# Patient Record
Sex: MALE | Race: WHITE | NOT HISPANIC OR LATINO | Employment: OTHER | URBAN - METROPOLITAN AREA
[De-identification: names, ages, dates, MRNs, and addresses within clinical notes are randomized per-mention and may not be internally consistent; named-entity substitution may affect disease eponyms.]

---

## 2017-01-23 DIAGNOSIS — I10 HYPERTENSION GOAL BP (BLOOD PRESSURE) < 150/90: Primary | ICD-10-CM

## 2017-01-23 NOTE — TELEPHONE ENCOUNTER
lisinopril      Last Written Prescription Date: 10/24/16  Last Fill Quantity: 90, # refills: 0  Last Office Visit with G, [facility] or [facility] prescribing provider: 7/28/16       POTASSIUM   Date Value Ref Range Status   02/02/2016 4.2 3.4 - 5.3 mmol/L Final     CREATININE   Date Value Ref Range Status   02/02/2016 0.82 0.66 - 1.25 mg/dL Final     BP Readings from Last 3 Encounters:   07/28/16 128/88   04/21/16 140/99   02/02/16 164/116

## 2017-01-24 RX ORDER — LISINOPRIL 20 MG/1
20 TABLET ORAL DAILY
Qty: 30 TABLET | Refills: 0 | Status: SHIPPED | OUTPATIENT
Start: 2017-01-24 | End: 2017-02-27

## 2017-01-24 NOTE — TELEPHONE ENCOUNTER
Pt needs appt now. Noted on rx for reminder to pt. Refilled 30 days only. Labs ordered  Stephanie Corona RN

## 2017-02-27 DIAGNOSIS — I10 HYPERTENSION GOAL BP (BLOOD PRESSURE) < 150/90: ICD-10-CM

## 2017-02-27 NOTE — TELEPHONE ENCOUNTER
lisinopril (PRINIVIL/ZESTRIL) 20 MG tablet      Last Written Prescription Date: 1/24/17  Last Fill Quantity: 30, # refills: 0  Last Office Visit with G, P or [facility] prescribing provider: 7/28/16       Potassium   Date Value Ref Range Status   02/02/2016 4.2 3.4 - 5.3 mmol/L Final     Creatinine   Date Value Ref Range Status   02/02/2016 0.82 0.66 - 1.25 mg/dL Final     BP Readings from Last 3 Encounters:   07/28/16 128/88   04/21/16 (!) 140/99   02/02/16 (!) 164/116

## 2017-03-01 RX ORDER — LISINOPRIL 20 MG/1
20 TABLET ORAL DAILY
Qty: 30 TABLET | Refills: 0 | Status: SHIPPED | OUTPATIENT
Start: 2017-03-01 | End: 2017-03-30

## 2017-03-01 NOTE — TELEPHONE ENCOUNTER
Routing refill request to provider for review/approval because:  Anais given x1 and patient did not follow up, please advise      Routing to  Reception - advise patient due for labs for future refills    Thank you,  Karyn Aviles RN

## 2017-03-06 DIAGNOSIS — I10 HYPERTENSION GOAL BP (BLOOD PRESSURE) < 150/90: ICD-10-CM

## 2017-03-06 PROCEDURE — 36415 COLL VENOUS BLD VENIPUNCTURE: CPT | Performed by: FAMILY MEDICINE

## 2017-03-06 PROCEDURE — 80061 LIPID PANEL: CPT | Performed by: FAMILY MEDICINE

## 2017-03-06 PROCEDURE — 80048 BASIC METABOLIC PNL TOTAL CA: CPT | Performed by: FAMILY MEDICINE

## 2017-03-06 NOTE — LETTER
[facility]   [address] [place]   [address]  [phone]    March 8, 2017      Micah Kelly  [address] [facility] [address]              Dear Mr. Kelly,    Thank you for getting labs done!    Your glucose level, electrolyte level and kidney function, measured with a test called the basic metabolic panel, is normal, which is great news!      Your cholesterol is pretty goo although your triglycerides are a little high.  To keep triglycerides in check,  watch sugar, juice, excess fruit, bread, pasta, rice and  cereal. Please continue to take Omega 3 (fish oil supplements) with  Meals.    Desired or goal levels are:  CHOLESTEROL: Desirable is less than 200.  HDL (Good Cholesterol): Desirable is greater than 40 in men and greater than 50 in women.  LDL (Bad Cholesterol): Desirable is less than 130.  TRIGLYCERIDES: Desirable is less than 150.    As you may know, abnormal cholesterol is one factor that increases your risk for heart disease and stroke. You can improve your cholesterol by controlling the amount and type of fat you eat and by increasing your daily activity level.    Here are some ways to improve your nutrition (adapted from the American Academy of Family Practice handout):  Eat less fat (especially butter, Crisco and other saturated fats)  Buy lean cuts of meat; reduce your portions of red meat or substitute poultry or fish  Use skim milk and low-fat dairy products  Eat no more than 4 egg yolks per week  Avoid fried or fast foods that are high in fat  Eat more fruits and vegetables    If you have any questions, please contact the clinic or schedule an appointment with me, thank you!    Results for orders placed or performed in visit on 03/06/17   Basic metabolic panel   Result Value Ref Range    Sodium 140 133 - 144 mmol/L    Potassium 4.5 3.4 - 5.3 mmol/L    Chloride 106 94 - 109 mmol/L    Carbon Dioxide 30 20 - 32 mmol/L    Anion Gap 4 3 - 14 mmol/L    Glucose  90 70 - 99 mg/dL    Urea Nitrogen 19 7 - 30 mg/dL    Creatinine 0.83 0.66 - 1.25 mg/dL    GFR Estimate >90  Non  GFR Calc   >60 mL/min/1.7m2    GFR Estimate If Black >90   GFR Calc   >60 mL/min/1.7m2    Calcium 8.8 8.5 - 10.1 mg/dL   Lipid panel reflex to direct LDL   Result Value Ref Range    Cholesterol 180 <200 mg/dL    Triglycerides 164 (H) <150 mg/dL    HDL Cholesterol 41 >39 mg/dL    LDL Cholesterol Calculated 106 (H) <100 mg/dL    Non HDL Cholesterol 139 (H) <130 mg/dL           Sincerely,    Gali Kennedy MD/nr

## 2017-03-06 NOTE — MR AVS SNAPSHOT
After Visit Summary   3/6/2017    Micah Kelly    MRN: [id number]           Patient Information     Date Of Birth          1955        Visit Information        Provider Department      3/6/2017 2:30 PM HW LAB [facility][note 2]         Follow-ups after your visit        Your next 10 appointments already scheduled     Mar 06, 2017  2:30 PM CST   LAB with HW LAB   [facility]note 2[facility] ([facility]note 2[facility]    [address] [place] [address] [phone]           Patient must bring picture ID.  Patient should be prepared to give a urine specimen  Please do not eat 10-12 hours before your appointment if you are coming in fasting for labs on lipids, cholesterol, or glucose (sugar).  Pregnant women should follow their Care Team instructions. Water with medications is okay. Do not drink coffee or other fluids.   If you have concerns about taking  your medications, please ask at office or if scheduling via [employer], send a message by clicking on Secure Messaging, Message Your Care Team.              Who to contact     If you have questions or need follow up information about today's clinic visit or your schedule please contact [facility]note 2[facility] directly at [phone].  Normal or non-critical lab and imaging results will be communicated to you by MyChart, letter or phone within 4 business days after the clinic has received the results. If you do not hear from us within 7 days, please contact the clinic through [facility] or phone. If you have a critical or abnormal lab result, we will notify you by phone as soon as possible.  Submit refill requests through [employer] or call your pharmacy and they will forward the refill request to us. Please allow 3 business days for your refill to be completed.          Additional Information About Your Visit        [facility] Information     [employer] lets you send messages to your doctor, view your test results, renew your  "prescriptions, schedule appointments and more. To sign up, go to [email or web address] . Click on \"Log in\" on the left side of the screen, which will take you to the Welcome page. Then click on \"Sign up Now\" on the right side of the page.     You will be asked to enter the access code listed below, as well as some personal information. Please follow the directions to create your username and password.     Your access code is: [email or web address]  Expires: 2017 11:20 AM     Your access code will  in 90 days. If you need help or a new code, please call your [place] clinic or [phone].        Care EveryWhere ID     This is your Care EveryWhere ID. This could be used by other organizations to access your [place] medical records  [id number]         Blood Pressure from Last 3 Encounters:   16 128/88   16 (!) 140/99   16 (!) 164/116    Weight from Last 3 Encounters:   16 155 lb (70.3 kg)   16 152 lb 2 oz (69 kg)   16 150 lb (68 kg)              Today, you had the following     No orders found for display       Primary Care Provider Office Phone # Fax #    Gali Kennedy [phone]       [facility] [address]  [facility] [address]        Thank you!     Thank you for choosing [facility]  for your care. Our goal is always to provide you with excellent care. Hearing back from our patients is one way we can continue to improve our services. Please take a few minutes to complete the written survey that you may receive in the mail after your visit with us. Thank you!             Your Updated Medication List - Protect others around you: Learn how to safely use, store and throw away your medicines at www.disposemymeds.org.          This list is accurate as of: 3/3/17 11:20 AM.  Always use your most recent med list.                   Brand Name Dispense Instructions for use    ASPIRIN PO      Take 81 mg by mouth daily       " FISH OIL PO          lisinopril 20 MG tablet    PRINIVIL/ZESTRIL    30 tablet    Take 1 tablet (20 mg) by mouth daily PLEASE SCHEDULE A LAB ONLY APPOINTMENT TO RECEIVE FUTURE REFILLS [phone]       VITAMIN C PO      Take 1,000 mg by mouth daily       VITAMIN D (CHOLECALCIFEROL) PO      Take 400 Units by mouth daily

## 2017-03-07 LAB
ANION GAP SERPL CALCULATED.3IONS-SCNC: 4 MMOL/L (ref 3–14)
BUN SERPL-MCNC: 19 MG/DL (ref 7–30)
CALCIUM SERPL-MCNC: 8.8 MG/DL (ref 8.5–10.1)
CHLORIDE SERPL-SCNC: 106 MMOL/L (ref 94–109)
CHOLEST SERPL-MCNC: 180 MG/DL
CO2 SERPL-SCNC: 30 MMOL/L (ref 20–32)
CREAT SERPL-MCNC: 0.83 MG/DL (ref 0.66–1.25)
GFR SERPL CREATININE-BSD FRML MDRD: NORMAL ML/MIN/1.7M2
GLUCOSE SERPL-MCNC: 90 MG/DL (ref 70–99)
HDLC SERPL-MCNC: 41 MG/DL
LDLC SERPL CALC-MCNC: 106 MG/DL
NONHDLC SERPL-MCNC: 139 MG/DL
POTASSIUM SERPL-SCNC: 4.5 MMOL/L (ref 3.4–5.3)
SODIUM SERPL-SCNC: 140 MMOL/L (ref 133–144)
TRIGL SERPL-MCNC: 164 MG/DL

## 2017-03-07 NOTE — PROGRESS NOTES
Thank you for getting labs done!    Your glucose level, electrolyte level and kidney function, measured with a test called the basic metabolic panel, is normal, which is great news!      Your cholesterol is pretty goo although your triglycerides are a little high.  To keep triglycerides in check,  watch sugar, juice, excess fruit, bread, pasta, rice and  cereal. Please continue to take Omega 3 (fish oil supplements) with  Meals.    Desired or goal levels are:  CHOLESTEROL: Desirable is less than 200.  HDL (Good Cholesterol): Desirable is greater than 40 in men and greater than 50 in women.  LDL (Bad Cholesterol): Desirable is less than 130.  TRIGLYCERIDES: Desirable is less than 150.    As you may know, abnormal cholesterol is one factor that increases your risk for heart disease and stroke. You can improve your cholesterol by controlling the amount and type of fat you eat and by increasing your daily activity level.    Here are some ways to improve your nutrition (adapted from the American Academy of Family Practice handout):  Eat less fat (especially butter, Crisco and other saturated fats)  Buy lean cuts of meat; reduce your portions of red meat or substitute poultry or fish  Use skim milk and low-fat dairy products  Eat no more than 4 egg yolks per week  Avoid fried or fast foods that are high in fat  Eat more fruits and vegetables    If you have any questions, please contact the clinic or schedule an appointment with me, thank you!    Sincerely,  Dr. Gali Kennedy MD  3/7/2017

## 2017-03-30 DIAGNOSIS — I10 HYPERTENSION GOAL BP (BLOOD PRESSURE) < 150/90: ICD-10-CM

## 2017-03-30 NOTE — TELEPHONE ENCOUNTER
Medication Detail      Disp Refills Start End MAXIMILIAN   lisinopril (PRINIVIL/ZESTRIL) 20 MG tablet 30 tablet 0 3/1/2017  No   Sig: Take 1 tablet (20 mg) by mouth daily PLEASE SCHEDULE A LAB ONLY APPOINTMENT TO RECEIVE FUTURE REFILLS [phone]       Last Office Visit with [facility], P or [facility] prescribing provider: 7/28/16       Potassium   Date Value Ref Range Status   03/06/2017 4.5 3.4 - 5.3 mmol/L Final     Creatinine   Date Value Ref Range Status   03/06/2017 0.83 0.66 - 1.25 mg/dL Final     BP Readings from Last 3 Encounters:   07/28/16 128/88   04/21/16 (!) 140/99   02/02/16 (!) 164/116

## 2017-04-03 RX ORDER — LISINOPRIL 20 MG/1
20 TABLET ORAL DAILY
Qty: 90 TABLET | Refills: 1 | Status: SHIPPED | OUTPATIENT
Start: 2017-04-03 | End: 2017-10-22

## 2017-04-03 NOTE — TELEPHONE ENCOUNTER
Patient was given 1 month ernst refill and instructed to make an appointment  No appointment has been scheduled  Alejandrase Silvestre RN

## 2017-04-04 DIAGNOSIS — I10 HYPERTENSION GOAL BP (BLOOD PRESSURE) < 150/90: ICD-10-CM

## 2017-04-04 NOTE — TELEPHONE ENCOUNTER
Medication Detail      Disp Refills Start End MAXIMILIAN   lisinopril (PRINIVIL/ZESTRIL) 20 MG tablet 90 tablet 1 4/3/2017  No   Sig: Take 1 tablet (20 mg) by mouth daily       Last Office Visit with G, P or [facility] prescribing provider: 7/28/16       Potassium   Date Value Ref Range Status   03/06/2017 4.5 3.4 - 5.3 mmol/L Final     Creatinine   Date Value Ref Range Status   03/06/2017 0.83 0.66 - 1.25 mg/dL Final     BP Readings from Last 3 Encounters:   07/28/16 128/88   04/21/16 (!) 140/99   02/02/16 (!) 164/116

## 2017-04-07 RX ORDER — LISINOPRIL 20 MG/1
TABLET ORAL
Qty: 30 TABLET | Refills: 0 | OUTPATIENT
Start: 2017-04-07

## 2017-05-16 ENCOUNTER — OFFICE VISIT (OUTPATIENT)
Dept: FAMILY MEDICINE | Facility: CLINIC | Age: 62
End: 2017-05-16
Payer: COMMERCIAL

## 2017-05-16 VITALS
SYSTOLIC BLOOD PRESSURE: 139 MMHG | HEART RATE: 82 BPM | TEMPERATURE: 98.2 F | WEIGHT: 153.5 LBS | DIASTOLIC BLOOD PRESSURE: 91 MMHG | BODY MASS INDEX: 25.58 KG/M2 | OXYGEN SATURATION: 95 % | HEIGHT: 65 IN

## 2017-05-16 DIAGNOSIS — I10 HYPERTENSION GOAL BP (BLOOD PRESSURE) < 150/90: Primary | ICD-10-CM

## 2017-05-16 PROCEDURE — 99213 OFFICE O/P EST LOW 20 MIN: CPT | Performed by: FAMILY MEDICINE

## 2017-05-16 RX ORDER — AMLODIPINE BESYLATE 2.5 MG/1
2.5 TABLET ORAL DAILY
Qty: 30 TABLET | Refills: 1 | Status: SHIPPED | OUTPATIENT
Start: 2017-05-16 | End: 2017-07-16

## 2017-05-16 NOTE — PROGRESS NOTES
SUBJECTIVE:                                                    Micah Kelly is a 61 year old male who presents to clinic today for the following health issues:      Hypertension Follow-up      Outpatient blood pressures are not being checked.    Low Salt Diet: no added salt       Amount of exercise or physical activity: 4-5 days/week for an average of 15-30 minutes    Problems taking medications regularly: No    Medication side effects: none    Diet: low salt  BP Readings from Last 3 Encounters:   05/16/17 (!) 139/91   07/28/16 128/88   04/21/16 (!) 140/99          Problem list and histories reviewed & adjusted, as indicated.  Additional history: as documented    Patient Active Problem List   Diagnosis     CARDIOVASCULAR SCREENING; LDL GOAL LESS THAN 160     Hypertension goal BP (blood pressure) < 150/90     Need for hepatitis C screening test     Past Surgical History:   Procedure Laterality Date     VASECTOMY  1986    uncomplicated       Social History   Substance Use Topics     Smoking status: Never Smoker     Smokeless tobacco: Never Used     Alcohol use 0.6 oz/week     1 Standard drinks or equivalent per week      Comment: ocassional drink     Family History   Problem Relation Age of Onset     Colon Cancer Mother 88     Peptic Ulcer Disease Mother      Hypertension Father          No Known Allergies  BP Readings from Last 3 Encounters:   05/16/17 (!) 139/91   07/28/16 128/88   04/21/16 (!) 140/99    Wt Readings from Last 3 Encounters:   05/16/17 153 lb 8 oz (69.6 kg)   07/28/16 155 lb (70.3 kg)   04/21/16 152 lb 2 oz (69 kg)                    Reviewed and updated as needed this visit by clinical staff       Reviewed and updated as needed this visit by Provider         ROS:  Constitutional, HEENT, cardiovascular, pulmonary, gi and gu systems are negative, except as otherwise noted.    OBJECTIVE:                                                    BP (!) 139/91  Pulse 82  Temp 98.2  F (36.8  C)  "(Tympanic)  Ht 5' 4.5\" (1.638 m)  Wt 153 lb 8 oz (69.6 kg)  SpO2 95%  BMI 25.94 kg/m2  Body mass index is 25.94 kg/(m^2).  GENERAL: healthy, alert and no distress  RESP: lungs clear to auscultation - no rales, rhonchi or wheezes  CV: regular rate and rhythm, normal S1 S2, no S3 or S4, no murmur, click or rub, no peripheral edema and peripheral pulses strong  MS: no gross musculoskeletal defects noted, no edema  PSYCH: mentation appears normal, affect normal/bright    Diagnostic Test Results:  none      ASSESSMENT/PLAN:                                                    1. Hypertension goal BP (blood pressure) < 150/90  BP Readings from Last 3 Encounters:   05/16/17 (!) 139/91   07/28/16 128/88   04/21/16 (!) 140/99    not at goal, will add norvasc today  Return to clinic 3 months for fu  - amLODIPine (NORVASC) 2.5 MG tablet; Take 1 tablet (2.5 mg) by mouth daily  Dispense: 30 tablet; Refill: 1        Gali Kennedy MD  [facility]        "

## 2017-05-16 NOTE — NURSING NOTE
"Chief Complaint   Patient presents with     Recheck Medication     Hypertension       Initial BP (!) 139/91  Pulse 82  Temp 98.2  F (36.8  C) (Tympanic)  Ht 5' 4.5\" (1.638 m)  Wt 153 lb 8 oz (69.6 kg)  SpO2 95%  BMI 25.94 kg/m2 Estimated body mass index is 25.94 kg/(m^2) as calculated from the following:    Height as of this encounter: 5' 4.5\" (1.638 m).    Weight as of this encounter: 153 lb 8 oz (69.6 kg).  Medication Reconciliation: Toribio Espinoza      "

## 2017-05-16 NOTE — MR AVS SNAPSHOT
"              After Visit Summary   2017    Micah Kelly    MRN: [id number]           Patient Information     Date Of Birth          1955        Visit Information        Provider Department      2017 1:00 PM Gali Kennedy MD [facility]        Today's Diagnoses     Hypertension goal BP (blood pressure) < 150/90    -  1       Follow-ups after your visit        Who to contact     If you have questions or need follow up information about today's clinic visit or your schedule please contact [facility] directly at [phone].  Normal or non-critical lab and imaging results will be communicated to you by MyChart, letter or phone within 4 business days after the clinic has received the results. If you do not hear from us within 7 days, please contact the clinic through [employer] or phone. If you have a critical or abnormal lab result, we will notify you by phone as soon as possible.  Submit refill requests through [employer] or call your pharmacy and they will forward the refill request to us. Please allow 3 business days for your refill to be completed.          Additional Information About Your Visit        MyChart Information     [employer] lets you send messages to your doctor, view your test results, renew your prescriptions, schedule appointments and more. To sign up, go to [email or web address] . Click on \"Log in\" on the left side of the screen, which will take you to the Welcome page. Then click on \"Sign up Now\" on the right side of the page.     You will be asked to enter the access code listed below, as well as some personal information. Please follow the directions to create your username and password.     Your access code is: [email or web address]  Expires: 2017 12:20 PM     Your access code will  in 90 days. If you need help or a new code, please call your [facility] or [phone].        Care EveryWhere ID     This is your Care EveryWhere ID. This " "could be used by other organizations to access your [place] medical records  [id number]        Your Vitals Were     Pulse Temperature Height Pulse Oximetry BMI (Body Mass Index)       82 98.2  F (36.8  C) (Tympanic) 5' 4.5\" (1.638 m) 95% 25.94 kg/m2        Blood Pressure from Last 3 Encounters:   05/16/17 (!) 139/91   07/28/16 128/88   04/21/16 (!) 140/99    Weight from Last 3 Encounters:   05/16/17 153 lb 8 oz (69.6 kg)   07/28/16 155 lb (70.3 kg)   04/21/16 152 lb 2 oz (69 kg)              Today, you had the following     No orders found for display         Today's Medication Changes          These changes are accurate as of: 5/16/17  2:00 PM.  If you have any questions, ask your nurse or doctor.               Start taking these medicines.        Dose/Directions    amLODIPine 2.5 MG tablet   Commonly known as:  NORVASC   Used for:  Hypertension goal BP (blood pressure) < 150/90   Started by:  Gali Kennedy MD        Dose:  2.5 mg   Take 1 tablet (2.5 mg) by mouth daily   Quantity:  30 tablet   Refills:  1            Where to get your medicines      These medications were sent to [place] [address] [place] [facility] [address] [place] [address]  [address] [facility] [address]     Phone:  [phone]     amLODIPine 2.5 MG tablet                Primary Care Provider Office Phone # Fax #    Gali Kennedy [phone]       [facility] [address]  [facility] [address]        Thank you!     Thank you for choosing [facility]  for your care. Our goal is always to provide you with excellent care. Hearing back from our patients is one way we can continue to improve our services. Please take a few minutes to complete the written survey that you may receive in the mail after your visit with us. Thank you!             Your Updated Medication List - Protect others around you: Learn how to safely use, store and throw away your medicines at " www.disposemymeds.org.          This list is accurate as of: 5/16/17  2:00 PM.  Always use your most recent med list.                   Brand Name Dispense Instructions for use    amLODIPine 2.5 MG tablet    NORVASC    30 tablet    Take 1 tablet (2.5 mg) by mouth daily       ASPIRIN PO      Take 81 mg by mouth daily       FISH OIL PO          lisinopril 20 MG tablet    PRINIVIL/ZESTRIL    90 tablet    Take 1 tablet (20 mg) by mouth daily       VITAMIN C PO      Take 1,000 mg by mouth daily       VITAMIN D (CHOLECALCIFEROL) PO      Take 400 Units by mouth daily

## 2017-06-14 DIAGNOSIS — I10 HYPERTENSION GOAL BP (BLOOD PRESSURE) < 150/90: ICD-10-CM

## 2017-06-14 RX ORDER — AMLODIPINE BESYLATE 2.5 MG/1
2.5 TABLET ORAL DAILY
Qty: 30 TABLET | Refills: 1 | Status: CANCELLED | OUTPATIENT
Start: 2017-06-14

## 2017-06-15 NOTE — TELEPHONE ENCOUNTER
Amlodipine      Last Written Prescription Date: 5/16/17  Last Fill Quantity: 30, # refills: 1    Last Office Visit with G, P or [facility] prescribing provider:  5/16/17  Future Office Visit:        BP Readings from Last 3 Encounters:   05/16/17 (!) 139/91   07/28/16 128/88   04/21/16 (!) 140/99     Called pharmacy and patient has a refill available.  Alejandra Rivers RN

## 2017-06-26 ENCOUNTER — TELEPHONE (OUTPATIENT)
Dept: FAMILY MEDICINE | Facility: CLINIC | Age: 62
End: 2017-06-26

## 2017-06-26 NOTE — TELEPHONE ENCOUNTER
Please schedule office visit with Dr. Kennedy for a 3 month follow up for blood pressure and medication. In August when he calls back.    Thank you!    Angélica Victoria

## 2017-07-16 DIAGNOSIS — I10 HYPERTENSION GOAL BP (BLOOD PRESSURE) < 150/90: ICD-10-CM

## 2017-07-17 RX ORDER — AMLODIPINE BESYLATE 2.5 MG/1
TABLET ORAL
Qty: 30 TABLET | Refills: 0 | Status: SHIPPED | OUTPATIENT
Start: 2017-07-17 | End: 2017-08-15

## 2017-07-17 NOTE — TELEPHONE ENCOUNTER
Medication Detail      Disp Refills Start End MAXIMILIAN   amLODIPine (NORVASC) 2.5 MG tablet 30 tablet 1 5/16/2017  --   Sig: Take 1 tablet (2.5 mg) by mouth daily        Last Office Visit with [facility], P or [facility] prescribing provider:  5/16/17   Future Office Visit:    Next 5 appointments (look out 90 days)     Aug 11, 2017  2:20 PM CDT   [facility] Gali [facility]    [address] [place] [address] [phone]                    BP Readings from Last 3 Encounters:   05/16/17 (!) 139/91   07/28/16 128/88   04/21/16 (!) 140/99

## 2017-07-17 NOTE — TELEPHONE ENCOUNTER
HTN addressed 5/16/2017 - advised 3 month F/U    Medication is being filled for 1 time refill only due to:  Patient needs to be seen because will be due for 3 month F/U.     Signed Prescriptions:                        Disp   Refills    amLODIPine (NORVASC) 2.5 MG tablet         30 tab*0        Sig: TAKE 1 TABLET (2.5 MG) BY MOUTH DAILY  Authorizing Provider: MARV FREEMAN  Ordering User: SAKINA DUBOIS      Closing encounter - no further actions needed at this time    Sakina Dubois RN

## 2017-08-11 ENCOUNTER — OFFICE VISIT (OUTPATIENT)
Dept: FAMILY MEDICINE | Facility: CLINIC | Age: 62
End: 2017-08-11
Payer: COMMERCIAL

## 2017-08-11 VITALS
BODY MASS INDEX: 25.86 KG/M2 | TEMPERATURE: 97.1 F | SYSTOLIC BLOOD PRESSURE: 126 MMHG | DIASTOLIC BLOOD PRESSURE: 80 MMHG | WEIGHT: 153 LBS | OXYGEN SATURATION: 96 % | HEART RATE: 103 BPM

## 2017-08-11 DIAGNOSIS — Z12.5 SCREENING FOR PROSTATE CANCER: ICD-10-CM

## 2017-08-11 DIAGNOSIS — I10 HYPERTENSION GOAL BP (BLOOD PRESSURE) < 150/90: Primary | ICD-10-CM

## 2017-08-11 DIAGNOSIS — Z23 NEED FOR SHINGLES VACCINE: ICD-10-CM

## 2017-08-11 DIAGNOSIS — Z12.11 SPECIAL SCREENING FOR MALIGNANT NEOPLASMS, COLON: ICD-10-CM

## 2017-08-11 PROCEDURE — 90736 HZV VACCINE LIVE SUBQ: CPT | Performed by: FAMILY MEDICINE

## 2017-08-11 PROCEDURE — 82043 UR ALBUMIN QUANTITATIVE: CPT | Performed by: FAMILY MEDICINE

## 2017-08-11 PROCEDURE — 99213 OFFICE O/P EST LOW 20 MIN: CPT | Mod: 25 | Performed by: FAMILY MEDICINE

## 2017-08-11 PROCEDURE — 90471 IMMUNIZATION ADMIN: CPT | Performed by: FAMILY MEDICINE

## 2017-08-11 PROCEDURE — G0103 PSA SCREENING: HCPCS | Performed by: FAMILY MEDICINE

## 2017-08-11 NOTE — NURSING NOTE
"Chief Complaint   Patient presents with     Hypertension       Initial /80  Pulse 103  Temp 97.1  F (36.2  C) (Tympanic)  Wt 153 lb (69.4 kg)  SpO2 96%  BMI 25.86 kg/m2 Estimated body mass index is 25.86 kg/(m^2) as calculated from the following:    Height as of 5/16/17: 5' 4.5\" (1.638 m).    Weight as of this encounter: 153 lb (69.4 kg).  Medication Reconciliation: complete     Annabel Lanza MA    "

## 2017-08-11 NOTE — PATIENT INSTRUCTIONS
You are due for a colonoscopy. You can call any of the numbers below.    1. [facility] Luis Dorado. [phone]       [email or web address]     2. [facility] [phone]     If you're interested, a website called [email or web address] can walk you through a health care directive, with a printable advanced care plan.

## 2017-08-11 NOTE — MR AVS SNAPSHOT
After Visit Summary   8/11/2017    Micah Kelly    MRN: [id number]           Patient Information     Date Of Birth          1955        Visit Information        Provider Department      8/11/2017 2:20 PM Gali Kennedy MD [facility]        Today's Diagnoses     Hypertension goal BP (blood pressure) < 150/90    -  1    Special screening for malignant neoplasms, colon        Need for shingles vaccine        Screening for prostate cancer          Care Instructions    You are due for a colonoscopy. You can call any of the numbers below.    1. [facility] CELSA [place]. [phone]       [email or web address]\""/[email or web address]/     2. [facility] [phone]     If you're interested, a website called [employer] [email or web address] can walk you through a health care directive, with a printable advanced care plan.          Follow-ups after your visit        Additional Services     GASTROENTEROLOGY ADULT REF PROCEDURE ONLY       Last Lab Result: Creatinine (mg/dL)       Date                     Value                 03/06/2017               0.83             ----------  Body mass index is 25.86 kg/(m^2).     Needed:  No  Language:  English    Patient will be contacted to schedule procedure.     Please be aware that coverage of these services is subject to the terms and limitations of your health insurance plan.  Call member services at your health plan with any benefit or coverage questions.  Any procedures must be performed at a [facility] facility OR coordinated by your clinic's referral office.    Please bring the following with you to your appointment:    (1) Any X-Rays, CTs or MRIs which have been performed.  Contact the facility where they were done to arrange for  prior to your scheduled appointment.    (2) List of current medications   (3) This referral request   (4) Any documents/labs given to you for  "this referral                  Who to contact     If you have questions or need follow up information about today's clinic visit or your schedule please contact [facility] directly at [phone].  Normal or non-critical lab and imaging results will be communicated to you by MyChart, letter or phone within 4 business days after the clinic has received the results. If you do not hear from us within 7 days, please contact the clinic through MyChart or phone. If you have a critical or abnormal lab result, we will notify you by phone as soon as possible.  Submit refill requests through [employer] or call your pharmacy and they will forward the refill request to us. Please allow 3 business days for your refill to be completed.          Additional Information About Your Visit        [facility] HospitalSetup Information     [employer] lets you send messages to your doctor, view your test results, renew your prescriptions, schedule appointments and more. To sign up, go to [email or web address] . Click on \"Log in\" on the left side of the screen, which will take you to the Welcome page. Then click on \"Sign up Now\" on the right side of the page.     You will be asked to enter the access code listed below, as well as some personal information. Please follow the directions to create your username and password.     Your access code is: [email or web address]  Expires: 2017  3:04 PM     Your access code will  in 90 days. If you need help or a new code, please call your [place] clinic or [phone].        Care EveryWhere ID     This is your Care EveryWhere ID. This could be used by other organizations to access your [place] medical records  [id number]        Your Vitals Were     Pulse Temperature Pulse Oximetry BMI (Body Mass Index)          103 97.1  F (36.2  C) (Tympanic) 96% 25.86 kg/m2         Blood Pressure from Last 3 Encounters:   17 126/80   17 (!) 139/91   16 128/88    Weight from Last 3 Encounters: "   08/11/17 153 lb (69.4 kg)   05/16/17 153 lb 8 oz (69.6 kg)   07/28/16 155 lb (70.3 kg)              We Performed the Following     Albumin Random Urine Quantitative     GASTROENTEROLOGY ADULT REF PROCEDURE ONLY     PSA, screen          Today's Medication Changes          These changes are accurate as of: 8/11/17  3:04 PM.  If you have any questions, ask your nurse or doctor.               Start taking these medicines.        Dose/Directions    zoster vaccine live (PF) injection   Commonly known as:  ZOSTAVAX   Used for:  Need for shingles vaccine   Started by:  Gali Kennedy MD        Dose:  1 each   Inject 0.65 mLs Subcutaneous once for 1 dose   Quantity:  0.65 mL   Refills:  0            Where to get your medicines      These medications were sent to [place] [address] [place] [facility] [address] [place] [address]  [address] [address]     Phone:  [phone]     zoster vaccine live (PF) injection                Primary Care Provider Office Phone # Fax #    Gali Kennedy [phone] [address] [facility] [place] [address]        Equal Access to Services     [facility]: Hadii collin ku hadasho Sokeeleyali, waaxda luqadaha, qaybta kaalmada adeegyada, sandi pickens . So [facility] [phone].    ATENCIÓN: Si habla español, tiene a garcia disposición servicios gratuitos de asistencia lingüística. Llame al [phone].    We comply with applicable federal civil rights laws and [place] laws. We do not discriminate on the basis of race, color, national origin, age, disability sex, sexual orientation or gender identity.            Thank you!     Thank you for choosing [facility]  for your care. Our goal is always to provide you with excellent care. Hearing back from our patients is one way we can continue to improve our services. Please take a few minutes to complete the written survey that you may receive in the mail after your visit  with us. Thank you!             Your Updated Medication List - Protect others around you: Learn how to safely use, store and throw away your medicines at www.disposemymeds.org.          This list is accurate as of: 8/11/17  3:04 PM.  Always use your most recent med list.                   Brand Name Dispense Instructions for use Diagnosis    amLODIPine 2.5 MG tablet    NORVASC    30 tablet    TAKE 1 TABLET (2.5 MG) BY MOUTH DAILY    Hypertension goal BP (blood pressure) < 150/90       ASPIRIN PO      Take 81 mg by mouth daily        FISH OIL PO           lisinopril 20 MG tablet    PRINIVIL/ZESTRIL    90 tablet    Take 1 tablet (20 mg) by mouth daily    Hypertension goal BP (blood pressure) < 150/90       VITAMIN C PO      Take 1,000 mg by mouth daily        VITAMIN D (CHOLECALCIFEROL) PO      Take 400 Units by mouth daily        zoster vaccine live (PF) injection    ZOSTAVAX    0.65 mL    Inject 0.65 mLs Subcutaneous once for 1 dose    Need for shingles vaccine

## 2017-08-11 NOTE — PROGRESS NOTES
SUBJECTIVE:                                                    Micah Kelly is a 62 year old male who presents to clinic today for the following health issues:      Hypertension Follow-up      Outpatient blood pressures are not being checked.    Low Salt Diet: Not monitoring but careful of how much using  BP Readings from Last 3 Encounters:   08/11/17 126/80   05/16/17 (!) 139/91   07/28/16 128/88            Amount of exercise or physical activity: 2-3 days/week for an average of 15-30 minutes    Problems taking medications regularly: No    Medication side effects: none  Diet: regular (no restrictions)      Problem list and histories reviewed & adjusted, as indicated.  Additional history: as documented    Patient Active Problem List   Diagnosis     CARDIOVASCULAR SCREENING; LDL GOAL LESS THAN 160     Hypertension goal BP (blood pressure) < 150/90     Need for hepatitis C screening test     Past Surgical History:   Procedure Laterality Date     VASECTOMY  1986    uncomplicated       Social History   Substance Use Topics     Smoking status: Never Smoker     Smokeless tobacco: Never Used     Alcohol use 0.6 oz/week     1 Standard drinks or equivalent per week      Comment: ocassional drink     Family History   Problem Relation Age of Onset     Colon Cancer Mother 88     Peptic Ulcer Disease Mother      Hypertension Father          No Known Allergies  BP Readings from Last 3 Encounters:   08/11/17 126/80   05/16/17 (!) 139/91   07/28/16 128/88    Wt Readings from Last 3 Encounters:   08/11/17 153 lb (69.4 kg)   05/16/17 153 lb 8 oz (69.6 kg)   07/28/16 155 lb (70.3 kg)             Reviewed and updated as needed this visit by clinical staffTobacco  Allergies  Meds  Med Hx  Surg Hx  Fam Hx  Soc Hx      Reviewed and updated as needed this visit by Provider       ROS:  Constitutional, HEENT, cardiovascular, pulmonary, gi and gu systems are negative, except as otherwise noted.      OBJECTIVE:   /80  Pulse  103  Temp 97.1  F (36.2  C) (Tympanic)  Wt 153 lb (69.4 kg)  SpO2 96%  BMI 25.86 kg/m2  Body mass index is 25.86 kg/(m^2).  GENERAL: healthy, alert and no distress  EYES: Eyes grossly normal to inspection, PERRL and conjunctivae and sclerae normal  HENT: ear canals and TM's normal, nose and mouth without ulcers or lesions  NECK: no adenopathy, no asymmetry, masses, or scars and thyroid normal to palpation  RESP: lungs clear to auscultation - no rales, rhonchi or wheezes  CV: regular rate and rhythm, normal S1 S2, no S3 or S4, no murmur, click or rub, no peripheral edema and peripheral pulses strong  ABDOMEN: soft, nontender, no hepatosplenomegaly, no masses and bowel sounds normal  MS: no gross musculoskeletal defects noted, no edema  SKIN: no suspicious lesions or rashes  NEURO: Normal strength and tone, mentation intact and speech normal  PSYCH: mentation appears normal, affect normal/bright    Diagnostic Test Results:  Results for orders placed or performed in visit on 08/11/17   Albumin Random Urine Quantitative   Result Value Ref Range    Creatinine Urine 97 mg/dL    Albumin Urine mg/L 5 mg/L    Albumin Urine mg/g Cr 5.14 0 - 17 mg/g Cr   PSA, screen   Result Value Ref Range    PSA 1.98 0 - 4 ug/L       ASSESSMENT/PLAN:     1. Hypertension goal BP (blood pressure) < 150/90  BP Readings from Last 3 Encounters:   08/11/17 126/80   05/16/17 (!) 139/91   07/28/16 128/88    at goal!  - Albumin Random Urine Quantitative    2. Special screening for malignant neoplasms, colon  Reminded to schedule  - GASTROENTEROLOGY ADULT REF PROCEDURE ONLY    3. Need for shingles vaccine  Recommended, done today  - zoster vaccine live, PF, (ZOSTAVAX) injection; Inject 0.65 mLs Subcutaneous once for 1 dose  Dispense: 0.65 mL; Refill: 0  - ZOSTER VACC LIVE SUBQ NJX    4. Screening for prostate cancer  I discussed risks/benefits of PSA testing in asymptomatic patients, including risk of false positive and negatives, possible  outcomes and follow up including possibly unnecessary prostate biopsies, patient  elected to proceed with testing.  normal    - PSA, screen    Health Maintenance Due   Topic Date Due     ADVANCE DIRECTIVE PLANNING Q5 YRS  06/02/2009     FIT Q1 YR  02/05/2017      Gali Kennedy MD  [facility]

## 2017-08-11 NOTE — LETTER
Micah Kelly  [address]  [facility] [address]        August 15, 2017        Dear ,    We are writing to inform you of your test results.    Hello!  It was a pleasure to see you in clinic!  Thank you for getting labs done. Everything looks normal, which is good news.     Your PSA (prostate cancer blood test)  is normal, which is good, so we don't have to make any tough decisions right now!    Your microalbumen is normal, which is great news. This means you do not have protein in the urine, and that your kidneys are currently functioning well. Keeping blood pressure and blood fats low is the best way to preserve your kidney function over your lifetime.     Keep up the good work!    Resulted Orders   Albumin Random Urine Quantitative   Result Value Ref Range    Creatinine Urine 97 mg/dL    Albumin Urine mg/L 5 mg/L    Albumin Urine mg/g Cr 5.14 0 - 17 mg/g Cr   PSA, screen   Result Value Ref Range    PSA 1.98 0 - 4 ug/L      Comment:      Assay Method:  Chemiluminescence using Siemens Vista analyzer     If you have any questions or concerns, please call the clinic at the number listed above.     Sincerely,  Gali Kennedy MD/nr

## 2017-08-12 LAB
CREAT UR-MCNC: 97 MG/DL
MICROALBUMIN UR-MCNC: 5 MG/L
MICROALBUMIN/CREAT UR: 5.14 MG/G CR (ref 0–17)
PSA SERPL-ACNC: 1.98 UG/L (ref 0–4)

## 2017-08-15 DIAGNOSIS — I10 HYPERTENSION GOAL BP (BLOOD PRESSURE) < 150/90: ICD-10-CM

## 2017-08-15 RX ORDER — AMLODIPINE BESYLATE 2.5 MG/1
TABLET ORAL
Qty: 90 TABLET | Refills: 3 | Status: SHIPPED | OUTPATIENT
Start: 2017-08-15 | End: 2018-09-02

## 2017-08-15 NOTE — TELEPHONE ENCOUNTER
Pt seen on 8/11/17 by PCP Marcia    BP Readings from Last 3 Encounters:   08/11/17 126/80   05/16/17 (!) 139/91   07/28/16 128/88     Notes not completed on visit of 8/11 so Refill request to MD/primary care provider.  Stephanie Corona RN

## 2017-08-15 NOTE — PROGRESS NOTES
Hello!  It was a pleasure to see you in clinic!  Thank you for getting labs done. Everything looks normal, which is good news.     Your PSA (prostate cancer blood test)  is normal, which is good, so we don't have to make any tough decisions right now!    Your microalbumen is normal, which is great news. This means you do not have protein in the urine, and that your kidneys are currently functioning well. Keeping blood pressure and blood fats low is the best way to preserve your kidney function over your lifetime.     Keep up the good work!    Sincerely,  Dr. Gali Kennedy MD  8/15/2017

## 2017-09-15 ENCOUNTER — TELEPHONE (OUTPATIENT)
Dept: FAMILY MEDICINE | Facility: CLINIC | Age: 62
End: 2017-09-15

## 2017-09-15 DIAGNOSIS — Z12.11 SPECIAL SCREENING FOR MALIGNANT NEOPLASMS, COLON: Primary | ICD-10-CM

## 2017-09-15 NOTE — TELEPHONE ENCOUNTER
Health Maintenance Due   Topic Date Due     ADVANCE DIRECTIVE PLANNING Q5 YRS  06/02/2009     FIT Q1 YR  02/05/2017     INFLUENZA VACCINE (SYSTEM ASSIGNED)  09/01/2017      Please call patient and encourage him to schedule colonoscopy, or see if he'd rather due an annual FIT test instead if getting a colonoscopy scheduled is too hard. Thanks! -Gali

## 2017-09-18 NOTE — TELEPHONE ENCOUNTER
Spoke with Micah and he would rather do the fit test so if you could order that for him so he can come in to pick that up, I told him probably in a couple of days

## 2017-09-25 PROCEDURE — 82274 ASSAY TEST FOR BLOOD FECAL: CPT | Performed by: FAMILY MEDICINE

## 2017-09-28 DIAGNOSIS — Z12.11 SPECIAL SCREENING FOR MALIGNANT NEOPLASMS, COLON: ICD-10-CM

## 2017-09-28 LAB — HEMOCCULT STL QL IA: NEGATIVE

## 2017-09-28 NOTE — LETTER
September 29, 2017      Micah TRACEY Leticia  [address]  [facility] [address]        Dear ,    We are writing to inform you of your test results.    Great news!  Your stool test was normal (negative for any blood).  Thank you for doing the test!     I recommend rechecking this test every year in order to screen for colon cancer.      Alternatively, you can choose anytime to have a colonoscopy.  The prep is more difficult, but if the colonoscopy is normal, you don't need another one for 10 years.    If you have any questions, please contact the clinic or schedule an appointment with me, thank you!    Resulted Orders   Fecal colorectal cancer screen (FIT)   Result Value Ref Range    Occult Blood Scn FIT Negative NEG^Negative       If you have any questions or concerns, please call the clinic at the number listed above.       Sincerely,        Gali Kennedy MD/nr

## 2017-09-29 NOTE — PROGRESS NOTES
Great news!  Your stool test was normal (negative for any blood).  Thank you for doing the test!     I recommend rechecking this test every year in order to screen for colon cancer.      Alternatively, you can choose anytime to have a colonoscopy.  The prep is more difficult, but if the colonoscopy is normal, you don't need another one for 10 years.    If you have any questions, please contact the clinic or schedule an appointment with me, thank you!    Sincerely,    MARV FREEMAN MD   9/29/2017

## 2017-10-22 DIAGNOSIS — I10 HYPERTENSION GOAL BP (BLOOD PRESSURE) < 150/90: ICD-10-CM

## 2017-10-24 RX ORDER — LISINOPRIL 20 MG/1
TABLET ORAL
Qty: 90 TABLET | Refills: 1 | Status: SHIPPED | OUTPATIENT
Start: 2017-10-24 | End: 2018-06-10

## 2017-10-27 ENCOUNTER — OFFICE VISIT (OUTPATIENT)
Dept: FAMILY MEDICINE | Facility: CLINIC | Age: 62
End: 2017-10-27
Payer: COMMERCIAL

## 2017-10-27 VITALS
HEIGHT: 65 IN | OXYGEN SATURATION: 93 % | TEMPERATURE: 97.5 F | WEIGHT: 149.75 LBS | BODY MASS INDEX: 24.95 KG/M2 | DIASTOLIC BLOOD PRESSURE: 81 MMHG | RESPIRATION RATE: 20 BRPM | HEART RATE: 86 BPM | SYSTOLIC BLOOD PRESSURE: 118 MMHG

## 2017-10-27 DIAGNOSIS — R05.9 COUGH: Primary | ICD-10-CM

## 2017-10-27 PROCEDURE — 99213 OFFICE O/P EST LOW 20 MIN: CPT | Performed by: FAMILY MEDICINE

## 2017-10-27 RX ORDER — AZITHROMYCIN 250 MG/1
TABLET, FILM COATED ORAL
Qty: 6 TABLET | Refills: 0 | Status: SHIPPED | OUTPATIENT
Start: 2017-10-27 | End: 2018-02-27

## 2017-10-27 RX ORDER — CODEINE PHOSPHATE AND GUAIFENESIN 10; 100 MG/5ML; MG/5ML
1 SOLUTION ORAL EVERY 4 HOURS PRN
Qty: 120 ML | Refills: 0 | Status: SHIPPED | OUTPATIENT
Start: 2017-10-27 | End: 2018-02-27

## 2017-10-27 NOTE — MR AVS SNAPSHOT
"              After Visit Summary   10/27/2017    Micah Kelly    MRN: [id number]           Patient Information     Date Of Birth          1955        Visit Information        Provider Department      10/27/2017 3:20 PM Jose A Zurita MD [facility]        Today's Diagnoses     Need for prophylactic vaccination and inoculation against influenza    -  1      Care Instructions    Afrin (oxymetazolin) nasal spray twice per day for 5 days. You do not need prescription for that.    Cough syrup as needed. Will make you sleepy and drowsy. It is also habit forming drug.     Antibiotics - 2 pills today and 1 pill every day after that. Total of 5 days.    Call clinic on Monday to make sure xray is working again and come back for an xray.           Follow-ups after your visit        Who to contact     If you have questions or need follow up information about today's clinic visit or your schedule please contact [facility] directly at [phone].  Normal or non-critical lab and imaging results will be communicated to you by ShowMehart, letter or phone within 4 business days after the clinic has received the results. If you do not hear from us within 7 days, please contact the clinic through [employer] or phone. If you have a critical or abnormal lab result, we will notify you by phone as soon as possible.  Submit refill requests through [employer] or call your pharmacy and they will forward the refill request to us. Please allow 3 business days for your refill to be completed.          Additional Information About Your Visit        [employer] Information     [employer] lets you send messages to your doctor, view your test results, renew your prescriptions, schedule appointments and more. To sign up, go to [email or web address] . Click on \"Log in\" on the left side of the screen, which will take you to the Welcome page. Then click on \"Sign up Now\" on the right side of the page.     You will " "be asked to enter the access code listed below, as well as some personal information. Please follow the directions to create your username and password.     Your access code is: [email or web address]  Expires: 2017  3:04 PM     Your access code will  in 90 days. If you need help or a new code, please call your [place] clinic or [phone].        Care EveryWhere ID     This is your Care EveryWhere ID. This could be used by other organizations to access your [place] medical records  [id number]        Your Vitals Were     Pulse Temperature Respirations Height Pulse Oximetry BMI (Body Mass Index)    86 97.5  F (36.4  C) (Tympanic) 20 5' 5\" (1.651 m) 93% 24.92 kg/m2       Blood Pressure from Last 3 Encounters:   10/27/17 118/81   17 126/80   17 (!) 139/91    Weight from Last 3 Encounters:   10/27/17 149 lb 12 oz (67.9 kg)   17 153 lb (69.4 kg)   17 153 lb 8 oz (69.6 kg)              We Performed the Following     FLU VAC, SPLIT VIRUS IM > 3 YO (QUADRIVALENT) [39550]     Vaccine Administration, Initial [97365]          Today's Medication Changes          These changes are accurate as of: 10/27/17  3:54 PM.  If you have any questions, ask your nurse or doctor.               Start taking these medicines.        Dose/Directions    azithromycin 250 MG tablet   Commonly known as:  ZITHROMAX   Used for:  Need for prophylactic vaccination and inoculation against influenza   Started by:  Jose A Zurita MD        Two tablets first day, then one tablet daily for four days.   Quantity:  6 tablet   Refills:  0       guaiFENesin-codeine 100-10 MG/5ML Soln solution   Commonly known as:  ROBITUSSIN AC   Used for:  Need for prophylactic vaccination and inoculation against influenza   Started by:  Jose A Zurita MD        Dose:  1 tsp.   Take 5 mLs by mouth every 4 hours as needed for cough   Quantity:  120 mL   Refills:  0            Where to get your medicines      These medications " were sent to [facility] [address] [place] [facility] - [place], [place] - [address]  [address], [facility] [address]     Phone:  [phone]     azithromycin 250 MG tablet         Some of these will need a paper prescription and others can be bought over the counter.  Ask your nurse if you have questions.     Bring a paper prescription for each of these medications     guaiFENesin-codeine 100-10 MG/5ML Soln solution                Primary Care Provider Office Phone # Fax #    Gali Kennedy [phone] [address]  [facility] [address]        Equal Access to Services     [facility]: Hadii aad ku hadasho Soomaali, waaxda luqadaha, qaybta kaalmada adeegyada, sandi pickens . So [facility] [phone].    ATENCIÓN: Si habla español, tiene a garcia disposición servicios gratuitos de asistencia lingüística. Ll[facility] [phone].    We comply with applicable federal civil rights laws and [place] laws. We do not discriminate on the basis of race, color, national origin, age, disability, sex, sexual orientation, or gender identity.            Thank you!     Thank you for choosing [facility]  for your care. Our goal is always to provide you with excellent care. Hearing back from our patients is one way we can continue to improve our services. Please take a few minutes to complete the written survey that you may receive in the mail after your visit with us. Thank you!             Your Updated Medication List - Protect others around you: Learn how to safely use, store and throw away your medicines at www.disposemymeds.org.          This list is accurate as of: 10/27/17  3:54 PM.  Always use your most recent med list.                   Brand Name Dispense Instructions for use Diagnosis    amLODIPine 2.5 MG tablet    NORVASC    90 tablet    TAKE 1 TABLET (2.5 MG) BY MOUTH DAILY    Hypertension goal BP (blood pressure) < 150/90       ASPIRIN PO      Take 81 mg  by mouth daily        azithromycin 250 MG tablet    ZITHROMAX    6 tablet    Two tablets first day, then one tablet daily for four days.    Need for prophylactic vaccination and inoculation against influenza       FISH OIL PO           guaiFENesin-codeine 100-10 MG/5ML Soln solution    ROBITUSSIN AC    120 mL    Take 5 mLs by mouth every 4 hours as needed for cough    Need for prophylactic vaccination and inoculation against influenza       lisinopril 20 MG tablet    PRINIVIL/ZESTRIL    90 tablet    TAKE 1 TABLET (20 MG) BY MOUTH DAILY. NEEDS TO BE SEEN.    Hypertension goal BP (blood pressure) < 150/90       VITAMIN C PO      Take 1,000 mg by mouth daily        VITAMIN D (CHOLECALCIFEROL) PO      Take 400 Units by mouth daily

## 2017-10-27 NOTE — PATIENT INSTRUCTIONS
Afrin (oxymetazolin) nasal spray twice per day for 5 days. You do not need prescription for that.    Cough syrup as needed. Will make you sleepy and drowsy. It is also habit forming drug.     Antibiotics - 2 pills today and 1 pill every day after that. Total of 5 days.    Call clinic on Monday to make sure xray is working again and come back for an xray.

## 2017-10-27 NOTE — NURSING NOTE
"Chief Complaint   Patient presents with     URI       Initial /81 (BP Location: Right arm, Patient Position: Chair, Cuff Size: Adult Large)  Pulse 86  Temp 97.5  F (36.4  C) (Tympanic)  Resp 20  Ht 5' 5\" (1.651 m)  Wt 149 lb 12 oz (67.9 kg)  SpO2 93%  BMI 24.92 kg/m2 Estimated body mass index is 24.92 kg/(m^2) as calculated from the following:    Height as of this encounter: 5' 5\" (1.651 m).    Weight as of this encounter: 149 lb 12 oz (67.9 kg).  Medication Reconciliation: complete     Marisol Beyer MA      "

## 2017-10-27 NOTE — PROGRESS NOTES
"  SUBJECTIVE:   Micah Kelly is a 62 year old male who presents to clinic today for the following health issues:       RESPIRATORY SYMPTOMS      Duration: 1 month    Description  nasal congestion, rhinorrhea, sore throat and cough    Severity: moderate    Accompanying signs and symptoms: None    History (predisposing factors):  none    Precipitating or alleviating factors: None    Therapies tried and outcome:  Nyquil, just to help sleep    No known sick contacts.  Taking care of his father but no major health issue.   No recent travel.   Constant cough is worst.   Dry cough.  No pneumonia before.     Problem list and histories reviewed & adjusted, as indicated.  Additional history: as documented    Labs reviewed in EPIC    Reviewed and updated as needed this visit by clinical staff     Reviewed and updated as needed this visit by Provider      Social History     Social History     Marital status:      Spouse name: N/A     Number of children: N/A     Years of education: N/A     Social History Main Topics     Smoking status: Never Smoker     Smokeless tobacco: Never Used     Alcohol use 0.6 oz/week     1 Standard drinks or equivalent per week      Comment: ocassional drink     Drug use: No     Sexual activity: No     Other Topics Concern     Parent/Sibling W/ Cabg, Mi Or Angioplasty Before 65f 55m? No     Social History Narrative     No Known Allergies  Patient Active Problem List   Diagnosis     CARDIOVASCULAR SCREENING; LDL GOAL LESS THAN 160     Hypertension goal BP (blood pressure) < 150/90     Need for hepatitis C screening test     Reviewed medications, social history and  past medical and surgical history.    Review of system: for general, respiratory, CVS, GI and psychiatry negative except for noted above.     EXAM:  /81 (BP Location: Right arm, Patient Position: Chair, Cuff Size: Adult Large)  Pulse 86  Temp 97.5  F (36.4  C) (Tympanic)  Resp 20  Ht 5' 5\" (1.651 m)  Wt 149 lb 12 oz " (67.9 kg)  SpO2 93%  BMI 24.92 kg/m2  Constitutional: healthy, alert and no distress   Psychiatric: mentation appears normal and affect normal/bright  Cardiovascular: RRR. No murmurs,  Respiratory: negative, Lungs clear. No crackles or wheezing. No tachypnea.   ENT: Both TM exam normal, mild cervical adenopathy, minimal sinus tenderness, throat mild erythema but no exudates       ASSESSMENT / PLAN:  (R05) Cough  (primary encounter diagnosis)  Comment: see pt instructions. We could not obtain xray as machine is down today. May benefit from xray due to duration of his symptoms. Future order signed.    Plan: guaiFENesin-codeine (ROBITUSSIN AC) 100-10         MG/5ML SOLN solution, azithromycin (ZITHROMAX)         250 MG tablet, XR Chest 2 Views               Patient Instructions   Afrin (oxymetazolin) nasal spray twice per day for 5 days. You do not need prescription for that.    Cough syrup as needed. Will make you sleepy and drowsy. It is also habit forming drug.     Antibiotics - 2 pills today and 1 pill every day after that. Total of 5 days.    Call clinic on Monday to make sure xray is working again and come back for an xray.           Injectable Influenza Immunization Documentation    1.  Is the person to be vaccinated sick today?   No    2. Does the person to be vaccinated have an allergy to a component   of the vaccine?   No  Egg Allergy Algorithm Link    3. Has the person to be vaccinated ever had a serious reaction   to influenza vaccine in the past?   No    4. Has the person to be vaccinated ever had Guillain-Barré syndrome?   No    Form completed by Marisol Beyer MA

## 2017-10-30 ENCOUNTER — RADIANT APPOINTMENT (OUTPATIENT)
Dept: GENERAL RADIOLOGY | Facility: CLINIC | Age: 62
End: 2017-10-30
Attending: FAMILY MEDICINE
Payer: COMMERCIAL

## 2017-10-30 DIAGNOSIS — R05.9 COUGH: ICD-10-CM

## 2017-10-30 PROCEDURE — 71020 XR CHEST 2 VW: CPT

## 2018-01-17 ENCOUNTER — TELEPHONE (OUTPATIENT)
Dept: FAMILY MEDICINE | Facility: CLINIC | Age: 63
End: 2018-01-17

## 2018-01-17 ENCOUNTER — OFFICE VISIT (OUTPATIENT)
Dept: FAMILY MEDICINE | Facility: CLINIC | Age: 63
End: 2018-01-17
Payer: COMMERCIAL

## 2018-01-17 VITALS
RESPIRATION RATE: 16 BRPM | HEIGHT: 65 IN | BODY MASS INDEX: 24.99 KG/M2 | WEIGHT: 150 LBS | SYSTOLIC BLOOD PRESSURE: 116 MMHG | OXYGEN SATURATION: 99 % | DIASTOLIC BLOOD PRESSURE: 80 MMHG | HEART RATE: 86 BPM | TEMPERATURE: 98.3 F

## 2018-01-17 DIAGNOSIS — R05.9 COUGH: ICD-10-CM

## 2018-01-17 DIAGNOSIS — J06.9 VIRAL URI WITH COUGH: Primary | ICD-10-CM

## 2018-01-17 LAB
BASOPHILS # BLD AUTO: 0 10E9/L (ref 0–0.2)
BASOPHILS NFR BLD AUTO: 0.3 %
DIFFERENTIAL METHOD BLD: NORMAL
EOSINOPHIL # BLD AUTO: 0.3 10E9/L (ref 0–0.7)
EOSINOPHIL NFR BLD AUTO: 5 %
ERYTHROCYTE [DISTWIDTH] IN BLOOD BY AUTOMATED COUNT: 12.9 % (ref 10–15)
FLUAV+FLUBV AG SPEC QL: NEGATIVE
FLUAV+FLUBV AG SPEC QL: NEGATIVE
HCT VFR BLD AUTO: 45 % (ref 40–53)
HGB BLD-MCNC: 14.7 G/DL (ref 13.3–17.7)
LYMPHOCYTES # BLD AUTO: 1.4 10E9/L (ref 0.8–5.3)
LYMPHOCYTES NFR BLD AUTO: 22.8 %
MCH RBC QN AUTO: 30.7 PG (ref 26.5–33)
MCHC RBC AUTO-ENTMCNC: 32.7 G/DL (ref 31.5–36.5)
MCV RBC AUTO: 94 FL (ref 78–100)
MONOCYTES # BLD AUTO: 1.2 10E9/L (ref 0–1.3)
MONOCYTES NFR BLD AUTO: 19.6 %
NEUTROPHILS # BLD AUTO: 3.1 10E9/L (ref 1.6–8.3)
NEUTROPHILS NFR BLD AUTO: 52.3 %
PLATELET # BLD AUTO: 396 10E9/L (ref 150–450)
RBC # BLD AUTO: 4.79 10E12/L (ref 4.4–5.9)
SPECIMEN SOURCE: NORMAL
WBC # BLD AUTO: 6 10E9/L (ref 4–11)

## 2018-01-17 PROCEDURE — 85025 COMPLETE CBC W/AUTO DIFF WBC: CPT | Performed by: INTERNAL MEDICINE

## 2018-01-17 PROCEDURE — 36415 COLL VENOUS BLD VENIPUNCTURE: CPT | Performed by: INTERNAL MEDICINE

## 2018-01-17 PROCEDURE — 99213 OFFICE O/P EST LOW 20 MIN: CPT | Performed by: INTERNAL MEDICINE

## 2018-01-17 PROCEDURE — 87804 INFLUENZA ASSAY W/OPTIC: CPT | Performed by: INTERNAL MEDICINE

## 2018-01-17 RX ORDER — CODEINE PHOSPHATE AND GUAIFENESIN 10; 100 MG/5ML; MG/5ML
1-2 SOLUTION ORAL
Qty: 120 ML | Refills: 0 | Status: SHIPPED | OUTPATIENT
Start: 2018-01-17 | End: 2018-02-27

## 2018-01-17 RX ORDER — ALBUTEROL SULFATE 90 UG/1
2 AEROSOL, METERED RESPIRATORY (INHALATION) EVERY 6 HOURS PRN
Qty: 1 INHALER | Refills: 0 | Status: SHIPPED | OUTPATIENT
Start: 2018-01-17 | End: 2018-02-27

## 2018-01-17 ASSESSMENT — ENCOUNTER SYMPTOMS
CHILLS: 0
SORE THROAT: 0
HEADACHES: 0
COUGH: 1
MYALGIAS: 1
FEVER: 0

## 2018-01-17 NOTE — MR AVS SNAPSHOT
After Visit Summary   1/17/2018    Micah Kelly    MRN: [id number]           Patient Information     Date Of Birth          1955        Visit Information        Provider Department      1/17/2018 4:00 PM Isabelle Vernon MD [facility]        Today's Diagnoses     Viral URI with cough    -  1    Cough          Care Instructions    Robitussin codeine at bedtime    Lung exam normal     Component      Latest Ref Rng & Units 1/17/2018   WBC      4.0 - 11.0 10e9/L 6.0   RBC Count      4.4 - 5.9 10e12/L 4.79   Hemoglobin      13.3 - 17.7 g/dL 14.7   Hematocrit      40.0 - 53.0 % 45.0   MCV      78 - 100 fl 94   MCH      26.5 - 33.0 pg 30.7   MCHC      31.5 - 36.5 g/dL 32.7   RDW      10.0 - 15.0 % 12.9   Platelet Count      150 - 450 10e9/L 396   Diff Method       Automated Method   % Neutrophils      % 52.3   % Lymphocytes      % 22.8   % Monocytes      % 19.6   % Eosinophils      % 5.0   % Basophils      % 0.3   Absolute Neutrophil      1.6 - 8.3 10e9/L 3.1   Absolute Lymphocytes      0.8 - 5.3 10e9/L 1.4   Absolute Monocytes      0.0 - 1.3 10e9/L 1.2   Absolute Eosinophils      0.0 - 0.7 10e9/L 0.3   Absolute Basophils      0.0 - 0.2 10e9/L 0.0   Influenza A/B Agn Specimen       Nasopharyngeal   Influenza A      NEG:Negative Negative   Influenza B      NEG:Negative Negative     You could have new illness    Call or return to clinic if symptoms worsen or fail to improve as anticipated.      Viral Upper Respiratory Illness (Adult)  You have a viral upper respiratory illness (URI), which is another term for the common cold. This illness is contagious during the first few days. It is spread through the air by coughing and sneezing. It may also be spread by direct contact (touching the sick person and then touching your own eyes, nose, or mouth). Frequent handwashing will decrease risk of spread. Most viral illnesses go away within 7 to 10 days with rest and simple home  remedies. Sometimes the illness may last for several weeks. Antibiotics will not kill a virus, and they are generally not prescribed for this condition.    Home care    If symptoms are severe, rest at home for the first 2 to 3 days. When you resume activity, don't let yourself get too tired.    Avoid being exposed to cigarette smoke (yours or others ).    You may use acetaminophen or ibuprofen to control pain and fever, unless another medicine was prescribed. (Note: If you have chronic liver or kidney disease, have ever had a stomach ulcer or gastrointestinal bleeding, or are taking blood-thinning medicines, talk with your healthcare provider before using these medicines.) Aspirin should never be given to anyone under 18 years of age who is ill with a viral infection or fever. It may cause severe liver or brain damage.    Your appetite may be poor, so a light diet is fine. Avoid dehydration by drinking 6 to 8 glasses of fluids per day (water, soft drinks, juices, tea, or soup). Extra fluids will help loosen secretions in the nose and lungs.    Over-the-counter cold medicines will not shorten the length of time you re sick, but they may be helpful for the following symptoms: cough, sore throat, and nasal and sinus congestion. (Note: Do not use decongestants if you have high blood pressure.)  Follow-up care  Follow up with your healthcare provider, or as advised.  When to seek medical advice  Call your healthcare provider right away if any of these occur:    Cough with lots of colored sputum (mucus)    Severe headache; face, neck, or ear pain    Difficulty swallowing due to throat pain    Fever of 100.4 F (38 C)  Call 911, or get immediate medical care  Call emergency services right away if any of these occur:    Chest pain, shortness of breath, wheezing, or difficulty breathing    Coughing up blood    Inability to swallow due to throat pain  Date Last Reviewed: 9/13/2015 2000-2017 [employer] [address]  "[place] [address]. All rights reserved. This information is not intended as a substitute for professional medical care. Always follow your healthcare professional's instructions.                Follow-ups after your visit        Who to contact     If you have questions or need follow up information about today's clinic visit or your schedule please contact [facility] directly at [phone].  Normal or non-critical lab and imaging results will be communicated to you by MyChart, letter or phone within 4 business days after the clinic has received the results. If you do not hear from us within 7 days, please contact the clinic through [employer] or phone. If you have a critical or abnormal lab result, we will notify you by phone as soon as possible.  Submit refill requests through [employer] or call your pharmacy and they will forward the refill request to us. Please allow 3 business days for your refill to be completed.          Additional Information About Your Visit        [employer] Information     [employer] lets you send messages to your doctor, view your test results, renew your prescriptions, schedule appointments and more. To sign up, go to [email or web address] . Click on \"Log in\" on the left side of the screen, which will take you to the Welcome page. Then click on \"Sign up Now\" on the right side of the page.     You will be asked to enter the access code listed below, as well as some personal information. Please follow the directions to create your username and password.     Your access code is: [email or web address]  Expires: 2018  4:49 PM     Your access code will  in 90 days. If you need help or a new code, please call your [facility] or [phone].        Care EveryWhere ID     This is your Care EveryWhere ID. This could be used by other organizations to access your [place] medical records  [id number]        Your Vitals Were     Pulse Temperature Respirations " "Height Pulse Oximetry BMI (Body Mass Index)    86 98.3  F (36.8  C) (Oral) 16 5' 5\" (1.651 m) 99% 24.96 kg/m2       Blood Pressure from Last 3 Encounters:   01/17/18 116/80   10/27/17 118/81   08/11/17 126/80    Weight from Last 3 Encounters:   01/17/18 150 lb (68 kg)   10/27/17 149 lb 12 oz (67.9 kg)   08/11/17 153 lb (69.4 kg)              We Performed the Following     CBC with platelets and differential     Influenza A/B antigen          Today's Medication Changes          These changes are accurate as of: 1/17/18  4:49 PM.  If you have any questions, ask your nurse or doctor.               Start taking these medicines.        Dose/Directions    albuterol 108 (90 BASE) MCG/ACT Inhaler   Commonly known as:  PROAIR HFA/PROVENTIL HFA/VENTOLIN HFA   Used for:  Viral URI with cough   Started by:  Isabelle Vernon MD        Dose:  2 puff   Inhale 2 puffs into the lungs every 6 hours as needed for shortness of breath / dyspnea or wheezing   Quantity:  1 Inhaler   Refills:  0         These medicines have changed or have updated prescriptions.        Dose/Directions    * guaiFENesin-codeine 100-10 MG/5ML Soln solution   Commonly known as:  ROBITUSSIN AC   This may have changed:  Another medication with the same name was added. Make sure you understand how and when to take each.   Used for:  Cough   Changed by:  Jose A Zurita MD        Dose:  1 tsp.   Take 5 mLs by mouth every 4 hours as needed for cough   Quantity:  120 mL   Refills:  0       * guaiFENesin-codeine 100-10 MG/5ML Soln solution   Commonly known as:  ROBITUSSIN AC   This may have changed:  You were already taking a medication with the same name, and this prescription was added. Make sure you understand how and when to take each.   Used for:  Cough   Changed by:  Isabelle Vernon MD        Dose:  1-2 tsp.   Take 5-10 mLs by mouth nightly as needed for cough   Quantity:  120 mL   Refills:  0       * Notice:  This list has 2 " medication(s) that are the same as other medications prescribed for you. Read the directions carefully, and ask your doctor or other care provider to review them with you.         Where to get your medicines      These medications were sent to [facility] - [place], [place] - [address]  [address], [place] [address]     Phone:  [phone]     albuterol 108 (90 BASE) MCG/ACT Inhaler         Some of these will need a paper prescription and others can be bought over the counter.  Ask your nurse if you have questions.     Bring a paper prescription for each of these medications     guaiFENesin-codeine 100-10 MG/5ML Soln solution                Primary Care Provider Office Phone # Fax #    Gali Kennedy [phone] [address]  [facility] [address]        Equal Access to Services     MARCOS SHI : Hadii collin hartman hadasho Soomaali, waaxda luqadaha, qaybta kaalmada adeegyada, sandi montesinos hayshannan pickens . So [facility] [phone].    ATENCIÓN: Si habla español, tiene a garcia disposición servicios gratuitos de asistencia lingüística. Paddy al [phone].    We comply with applicable federal civil rights laws and [place] laws. We do not discriminate on the basis of race, color, national origin, age, disability, sex, sexual orientation, or gender identity.            Thank you!     Thank you for choosing [facility]  for your care. Our goal is always to provide you with excellent care. Hearing back from our patients is one way we can continue to improve our services. Please take a few minutes to complete the written survey that you may receive in the mail after your visit with us. Thank you!             Your Updated Medication List - Protect others around you: Learn how to safely use, store and throw away your medicines at www.disposemymeds.org.          This list is accurate as of: 1/17/18  4:49 PM.  Always use your most recent med  list.                   Brand Name Dispense Instructions for use Diagnosis    albuterol 108 (90 BASE) MCG/ACT Inhaler    PROAIR HFA/PROVENTIL HFA/VENTOLIN HFA    1 Inhaler    Inhale 2 puffs into the lungs every 6 hours as needed for shortness of breath / dyspnea or wheezing    Viral URI with cough       amLODIPine 2.5 MG tablet    NORVASC    90 tablet    TAKE 1 TABLET (2.5 MG) BY MOUTH DAILY    Hypertension goal BP (blood pressure) < 150/90       ASPIRIN PO      Take 81 mg by mouth daily        azithromycin 250 MG tablet    ZITHROMAX    6 tablet    Two tablets first day, then one tablet daily for four days.    Cough       FISH OIL PO           * guaiFENesin-codeine 100-10 MG/5ML Soln solution    ROBITUSSIN AC    120 mL    Take 5 mLs by mouth every 4 hours as needed for cough    Cough       * guaiFENesin-codeine 100-10 MG/5ML Soln solution    ROBITUSSIN AC    120 mL    Take 5-10 mLs by mouth nightly as needed for cough    Cough       lisinopril 20 MG tablet    PRINIVIL/ZESTRIL    90 tablet    TAKE 1 TABLET (20 MG) BY MOUTH DAILY. NEEDS TO BE SEEN.    Hypertension goal BP (blood pressure) < 150/90       VITAMIN C PO      Take 1,000 mg by mouth daily        VITAMIN D (CHOLECALCIFEROL) PO      Take 400 Units by mouth daily        * Notice:  This list has 2 medication(s) that are the same as other medications prescribed for you. Read the directions carefully, and ask your doctor or other care provider to review them with you.

## 2018-01-17 NOTE — TELEPHONE ENCOUNTER
Patient called and spoke with writer.    Per patient:  1. Symptoms began yesterday   A. Body aches   B. Productive cough-green phlegm   C. Temperature is 99 F   D. Congestion   2. Is able to keep food down  3. Would like appt today      Writer informed patient no appt at [place] but could get him an appt at [place] today.  Appt scheduled this afternoon with Dr. Vernon.  Appt date, time and location confirmed with patient.    Writer also recommended:  1. Rest  2. Push fluids-frequent small sips of water  3. Warm tea with 1/2 tsp honey  4. Call back with any new, changing or worsening symptoms    Patient verbalized understanding and in agreement with plan.  ROSALVA Ko, CHELYN, RN

## 2018-01-17 NOTE — NURSING NOTE
"Chief Complaint   Patient presents with     URI     sick on and off for 2 months. coughing that won't go away, can't sleep. Got worse yesterday, aches and fever.        Initial /80  Resp 16  Ht 5' 5\" (1.651 m)  Wt 150 lb (68 kg)  BMI 24.96 kg/m2 Estimated body mass index is 24.96 kg/(m^2) as calculated from the following:    Height as of this encounter: 5' 5\" (1.651 m).    Weight as of this encounter: 150 lb (68 kg).  Medication Reconciliation: complete  "

## 2018-01-17 NOTE — PROGRESS NOTES
SUBJECTIVE:   Micah Kelly is a 62 year old male presenting with a chief complaint of   Chief Complaint   Patient presents with     URI     sick on and off for 2 months. coughing that won't go away, can't sleep. Got worse yesterday, aches and fever.    .    Onset of symptoms was 2 month(s) ago.  Course of illness is off & on.      Current and Associated symptoms:  Worse since yesterday  Constant cough   cough - non-productive and body aches  Concerned about pneumonia  Treatment measures tried include OTC Cough med.  Predisposing factors include None.      Review of Systems   Constitutional: Positive for malaise/fatigue. Negative for chills and fever.   HENT: Negative for ear pain and sore throat.    Respiratory: Positive for cough.         Chest congestion   Musculoskeletal: Positive for myalgias.   Neurological: Negative for headaches.       CHEST TWO VIEWS  10/30/2017 3:58 PM       COMPARISON: None.     FINDINGS: The heart size is normal. There are dense nodules in the  retrosternal space superiorly which may be granulomas. The lungs are  otherwise clear. No pneumothorax or pleural effusion. Degenerative  disease in the spine.         IMPRESSION: No acute abnormality.     ISABELLE MO TWO VIEWS  10/30/2017 3:58 PM       COMPARISON: None.     FINDINGS: The heart size is normal. There are dense nodules in the  retrosternal space superiorly which may be granulomas. The lungs are  otherwise clear. No pneumothorax or pleural effusion. Degenerative  disease in the spine.         IMPRESSION: No acute abnormality.     JOHN MEZA MD      Past Medical History:   Diagnosis Date     CARDIOVASCULAR SCREENING; LDL GOAL LESS THAN 160 2/2/2016     Hypertension      Current Outpatient Prescriptions   Medication Sig Dispense Refill     guaiFENesin-codeine (ROBITUSSIN AC) 100-10 MG/5ML SOLN solution Take 5-10 mLs by mouth nightly as needed for cough 120 mL 0     albuterol (PROAIR HFA/PROVENTIL HFA/VENTOLIN HFA)  "108 (90 BASE) MCG/ACT Inhaler Inhale 2 puffs into the lungs every 6 hours as needed for shortness of breath / dyspnea or wheezing 1 Inhaler 0     lisinopril (PRINIVIL/ZESTRIL) 20 MG tablet TAKE 1 TABLET (20 MG) BY MOUTH DAILY. NEEDS TO BE SEEN. 90 tablet 1     amLODIPine (NORVASC) 2.5 MG tablet TAKE 1 TABLET (2.5 MG) BY MOUTH DAILY 90 tablet 3     guaiFENesin-codeine (ROBITUSSIN AC) 100-10 MG/5ML SOLN solution Take 5 mLs by mouth every 4 hours as needed for cough 120 mL 0     azithromycin (ZITHROMAX) 250 MG tablet Two tablets first day, then one tablet daily for four days. 6 tablet 0     ASPIRIN PO Take 81 mg by mouth daily       VITAMIN D, CHOLECALCIFEROL, PO Take 400 Units by mouth daily       Ascorbic Acid (VITAMIN C PO) Take 1,000 mg by mouth daily       Omega-3 Fatty Acids (FISH OIL PO)        Social History   Substance Use Topics     Smoking status: Never Smoker     Smokeless tobacco: Never Used     Alcohol use 0.6 oz/week     1 Standard drinks or equivalent per week      Comment: ocassional drink       OBJECTIVE  /80  Pulse 86  Temp 98.3  F (36.8  C) (Oral)  Resp 16  Ht 5' 5\" (1.651 m)  Wt 150 lb (68 kg)  SpO2 99%  BMI 24.96 kg/m2    Physical Exam   Constitutional: He is well-developed, well-nourished, and in no distress.   HENT:   Nose: Nose normal.   Mouth/Throat: Oropharynx is clear and moist. No oropharyngeal exudate.   Tympanic membranes clear bilaterally   Cardiovascular: Normal rate, regular rhythm, normal heart sounds and intact distal pulses.    Pulmonary/Chest: Effort normal and breath sounds normal. No respiratory distress. He has no wheezes.   Lymphadenopathy:     He has no cervical adenopathy.       Labs:  Results for orders placed or performed in visit on 01/17/18 (from the past 24 hour(s))   CBC with platelets and differential   Result Value Ref Range    WBC 6.0 4.0 - 11.0 10e9/L    RBC Count 4.79 4.4 - 5.9 10e12/L    Hemoglobin 14.7 13.3 - 17.7 g/dL    Hematocrit 45.0 40.0 - 53.0 " %    MCV 94 78 - 100 fl    MCH 30.7 26.5 - 33.0 pg    MCHC 32.7 31.5 - 36.5 g/dL    RDW 12.9 10.0 - 15.0 %    Platelet Count 396 150 - 450 10e9/L    Diff Method Automated Method     % Neutrophils 52.3 %    % Lymphocytes 22.8 %    % Monocytes 19.6 %    % Eosinophils 5.0 %    % Basophils 0.3 %    Absolute Neutrophil 3.1 1.6 - 8.3 10e9/L    Absolute Lymphocytes 1.4 0.8 - 5.3 10e9/L    Absolute Monocytes 1.2 0.0 - 1.3 10e9/L    Absolute Eosinophils 0.3 0.0 - 0.7 10e9/L    Absolute Basophils 0.0 0.0 - 0.2 10e9/L   Influenza A/B antigen   Result Value Ref Range    Influenza A/B Agn Specimen Nasopharyngeal     Influenza A Negative NEG^Negative    Influenza B Negative NEG^Negative       X-Ray was not done.  Declines chest x-ray as had one as noted above      ASSESSMENT:      ICD-10-CM    1. Viral URI with cough J06.9 albuterol (PROAIR HFA/PROVENTIL HFA/VENTOLIN HFA) 108 (90 BASE) MCG/ACT Inhaler    B97.89    2. Cough R05 Influenza A/B antigen     CBC with platelets and differential     guaiFENesin-codeine (ROBITUSSIN AC) 100-10 MG/5ML SOLN solution        Medical Decision Making:    Differential Diagnosis:  Bronchospasm and Pneumonia  With acute onset of symptoms being worse yesterday considered new onset influenza.    Serious Comorbid Conditions:  None    PLAN:  Patient Instructions     Robitussin codeine at bedtime    Lung exam normal     Component      Latest Ref Rng & Units 1/17/2018   WBC      4.0 - 11.0 10e9/L 6.0   RBC Count      4.4 - 5.9 10e12/L 4.79   Hemoglobin      13.3 - 17.7 g/dL 14.7   Hematocrit      40.0 - 53.0 % 45.0   MCV      78 - 100 fl 94   MCH      26.5 - 33.0 pg 30.7   MCHC      31.5 - 36.5 g/dL 32.7   RDW      10.0 - 15.0 % 12.9   Platelet Count      150 - 450 10e9/L 396   Diff Method       Automated Method   % Neutrophils      % 52.3   % Lymphocytes      % 22.8   % Monocytes      % 19.6   % Eosinophils      % 5.0   % Basophils      % 0.3   Absolute Neutrophil      1.6 - 8.3 10e9/L 3.1   Absolute  Lymphocytes      0.8 - 5.3 10e9/L 1.4   Absolute Monocytes      0.0 - 1.3 10e9/L 1.2   Absolute Eosinophils      0.0 - 0.7 10e9/L 0.3   Absolute Basophils      0.0 - 0.2 10e9/L 0.0   Influenza A/B Agn Specimen       Nasopharyngeal   Influenza A      NEG:Negative Negative   Influenza B      NEG:Negative Negative     You could have new illness    Call or return to clinic if symptoms worsen or fail to improve as anticipated.      Viral Upper Respiratory Illness (Adult)  You have a viral upper respiratory illness (URI), which is another term for the common cold. This illness is contagious during the first few days. It is spread through the air by coughing and sneezing. It may also be spread by direct contact (touching the sick person and then touching your own eyes, nose, or mouth). Frequent handwashing will decrease risk of spread. Most viral illnesses go away within 7 to 10 days with rest and simple home remedies. Sometimes the illness may last for several weeks. Antibiotics will not kill a virus, and they are generally not prescribed for this condition.    Home care    If symptoms are severe, rest at home for the first 2 to 3 days. When you resume activity, don't let yourself get too tired.    Avoid being exposed to cigarette smoke (yours or others ).    You may use acetaminophen or ibuprofen to control pain and fever, unless another medicine was prescribed. (Note: If you have chronic liver or kidney disease, have ever had a stomach ulcer or gastrointestinal bleeding, or are taking blood-thinning medicines, talk with your healthcare provider before using these medicines.) Aspirin should never be given to anyone under 18 years of age who is ill with a viral infection or fever. It may cause severe liver or brain damage.    Your appetite may be poor, so a light diet is fine. Avoid dehydration by drinking 6 to 8 glasses of fluids per day (water, soft drinks, juices, tea, or soup). Extra fluids will help loosen secretions  in the nose and lungs.    Over-the-counter cold medicines will not shorten the length of time you re sick, but they may be helpful for the following symptoms: cough, sore throat, and nasal and sinus congestion. (Note: Do not use decongestants if you have high blood pressure.)  Follow-up care  Follow up with your healthcare provider, or as advised.  When to seek medical advice  Call your healthcare provider right away if any of these occur:    Cough with lots of colored sputum (mucus)    Severe headache; face, neck, or ear pain    Difficulty swallowing due to throat pain    Fever of 100.4 F (38 C)  Call 911, or get immediate medical care  Call emergency services right away if any of these occur:    Chest pain, shortness of breath, wheezing, or difficulty breathing    Coughing up blood    Inability to swallow due to throat pain  Date Last Reviewed: 9/13/2015 2000-2017 [employer]. [address] [place] [address]. All rights reserved. This information is not intended as a substitute for professional medical care. Always follow your healthcare professional's instructions.        The Flu (Influenza)     The virus that causes the flu spreads through the air in droplets when someone who has the flu coughs, sneezes, laughs, or talks.   The flu (influenza) is an infection that affects your respiratory tract. This tract is made up of your mouth, nose, and lungs, and the passages between them. Unlike a cold, the flu can make you very ill. And it can lead to pneumonia, a serious lung infection. The flu can have serious complications and even cause death.  Who is at risk for the flu?  Anyone can get the flu. But you are more likely to become infected if you:    Have a weakened immune system    Work in a healthcare setting where you may be exposed to flu germs    Live or work with someone who has the flu    Haven t had an annual flu shot  How does the flu spread?  The flu is caused by a virus. The  virus spreads through the air in droplets when someone who has the flu coughs, sneezes, laughs, or talks. You can become infected when you inhale these viruses directly. You can also become infected when you touch a surface on which the droplets have landed and then transfer the germs to your eyes, nose, or mouth. Touching used tissues, or sharing utensils, drinking glasses, or a toothbrush from an infected person can expose you to flu viruses, too.  What are the symptoms of the flu?  Flu symptoms tend to come on quickly and may last a few days to a few weeks. They include:    Fever usually higher than 100.4 F  (38 C) and chills    Sore throat and headache    Dry cough    Runny nose    Tiredness and weakness    Muscle aches  Who is at risk for flu complications?  For some people, the flu can be very serious. The risk for complications is greater for:    Children younger than age 5    Adults ages 65 and older    People with a chronic illness such as diabetes or heart, kidney, or lung disease    People who live in a nursing home or long-term care facility   How is the flu treated?  The flu usually gets better after 7 days or so. In some cases, your healthcare provider may prescribe an antiviral medicine. This may help you get well a little sooner. For the medicine to help, you need to take it as soon as possible (ideally within 48 hours) after your symptoms start. If you develop pneumonia or other serious illness, you may need to stay in the hospital.  Easing flu symptoms    Drink lots of fluids such as water, juice, and warm soup. A good rule is to drink enough so that you urinate your normal amount.    Get plenty of rest.    Ask your healthcare provider what to take for fever and pain.    Call your provider if your fever is 100.4 F (38 C) or higher, or you become dizzy, lightheaded, or short of breath.  Taking steps to protect others    Wash your hands often, especially after coughing or sneezing. Or clean your  hands with an alcohol-based hand  containing at least 60% alcohol.    Cough or sneeze into a tissue. Then throw the tissue away and wash your hands. If you don t have a tissue, cough and sneeze into your elbow.    Stay home until at least 24 hours after you no longer have a fever or chills. Be sure the fever isn t being hidden by fever-reducing medicine.    Don t share food, utensils, drinking glasses, or a toothbrush with others.    Ask your healthcare provider if others in your household should get antiviral medicine to help them avoid infection.  How can the flu be prevented?    One of the best ways to avoid the flu is to get a flu vaccine each year. The virus that causes the flu changes from year to year. For that reason, healthcare providers recommend getting the flu vaccine each year, as soon as it's available in your area. The vaccine is given as a shot. Your healthcare provider can tell you which vaccine is right for you. A nasal spray is also available but is not recommended for the 8527-5687 flu season. The CDC says this is because the nasal spray did not seem to protect against the flu over the last several flu seasons. In the past, it was meant for people ages 2 to 49.    Wash your hands often. Frequent handwashing is a proven way to help prevent infection.    Carry an alcohol-based hand gel containing at least 60% alcohol. Use it when you can't use soap and water. Then wash your hands as soon as you can.    Avoid touching your eyes, nose, and mouth.    At home and work, clean phones, computer keyboards, and toys often with disinfectant wipes.    If possible, avoid close contact with others who have the flu or symptoms of the flu.  Handwashing tips  Handwashing is one of the best ways to prevent many common infections. If you are caring for or visiting someone with the flu, wash your hands each time you enter and leave the room. Follow these steps:    Use warm water and plenty of soap. Rub your  hands together well.    Clean the whole hand, including under your nails, between your fingers, and up the wrists.    Wash for at least 15 seconds.    Rinse, letting the water run down your fingers, not up your wrists.    Dry your hands well. Use a paper towel to turn off the faucet and open the door.  Using alcohol-based hand   Alcohol-based hand  are also a good choice. Use them when you can't use soap and water. Follow these steps:    Squeeze about a tablespoon of gel into the palm of one hand.    Rub your hands together briskly, cleaning the backs of your hands, the palms, between your fingers, and up the wrists.    Rub until the gel is gone and your hands are completely dry.  Preventing the flu in healthcare settings  The flu is a special concern for people in hospitals and long-term care facilities. To help prevent the spread of flu, many hospitals and nursing homes take these steps:    Healthcare providers wash their hands or use an alcohol-based hand  before and after treating each patient.    People with the flu have private rooms and bathrooms or share a room with someone with the same infection.    People who are at high risk for the flu but don't have it are encouraged to get the flu and pneumonia vaccines.    All healthcare workers are encouraged or required to get flu shots.   Date Last Reviewed: 12/1/2016 2000-2017 [employer]. [address] [place] [address]. All rights reserved. This information is not intended as a substitute for professional medical care. Always follow your healthcare professional's instructions.

## 2018-01-17 NOTE — PATIENT INSTRUCTIONS
Robitussin codeine at bedtime    Lung exam normal     Component      Latest Ref Rng & Units 1/17/2018   WBC      4.0 - 11.0 10e9/L 6.0   RBC Count      4.4 - 5.9 10e12/L 4.79   Hemoglobin      13.3 - 17.7 g/dL 14.7   Hematocrit      40.0 - 53.0 % 45.0   MCV      78 - 100 fl 94   MCH      26.5 - 33.0 pg 30.7   MCHC      31.5 - 36.5 g/dL 32.7   RDW      10.0 - 15.0 % 12.9   Platelet Count      150 - 450 10e9/L 396   Diff Method       Automated Method   % Neutrophils      % 52.3   % Lymphocytes      % 22.8   % Monocytes      % 19.6   % Eosinophils      % 5.0   % Basophils      % 0.3   Absolute Neutrophil      1.6 - 8.3 10e9/L 3.1   Absolute Lymphocytes      0.8 - 5.3 10e9/L 1.4   Absolute Monocytes      0.0 - 1.3 10e9/L 1.2   Absolute Eosinophils      0.0 - 0.7 10e9/L 0.3   Absolute Basophils      0.0 - 0.2 10e9/L 0.0   Influenza A/B Agn Specimen       Nasopharyngeal   Influenza A      NEG:Negative Negative   Influenza B      NEG:Negative Negative     You could have new illness    Call or return to clinic if symptoms worsen or fail to improve as anticipated.      Viral Upper Respiratory Illness (Adult)  You have a viral upper respiratory illness (URI), which is another term for the common cold. This illness is contagious during the first few days. It is spread through the air by coughing and sneezing. It may also be spread by direct contact (touching the sick person and then touching your own eyes, nose, or mouth). Frequent handwashing will decrease risk of spread. Most viral illnesses go away within 7 to 10 days with rest and simple home remedies. Sometimes the illness may last for several weeks. Antibiotics will not kill a virus, and they are generally not prescribed for this condition.    Home care    If symptoms are severe, rest at home for the first 2 to 3 days. When you resume activity, don't let yourself get too tired.    Avoid being exposed to cigarette smoke (yours or others ).    You may use acetaminophen or  ibuprofen to control pain and fever, unless another medicine was prescribed. (Note: If you have chronic liver or kidney disease, have ever had a stomach ulcer or gastrointestinal bleeding, or are taking blood-thinning medicines, talk with your healthcare provider before using these medicines.) Aspirin should never be given to anyone under 18 years of age who is ill with a viral infection or fever. It may cause severe liver or brain damage.    Your appetite may be poor, so a light diet is fine. Avoid dehydration by drinking 6 to 8 glasses of fluids per day (water, soft drinks, juices, tea, or soup). Extra fluids will help loosen secretions in the nose and lungs.    Over-the-counter cold medicines will not shorten the length of time you re sick, but they may be helpful for the following symptoms: cough, sore throat, and nasal and sinus congestion. (Note: Do not use decongestants if you have high blood pressure.)  Follow-up care  Follow up with your healthcare provider, or as advised.  When to seek medical advice  Call your healthcare provider right away if any of these occur:    Cough with lots of colored sputum (mucus)    Severe headache; face, neck, or ear pain    Difficulty swallowing due to throat pain    Fever of 100.4 F (38 C)  Call 911, or get immediate medical care  Call emergency services right away if any of these occur:    Chest pain, shortness of breath, wheezing, or difficulty breathing    Coughing up blood    Inability to swallow due to throat pain  Date Last Reviewed: 9/13/2015 2000-2017 [employer]. [address] [place] [address]. All rights reserved. This information is not intended as a substitute for professional medical care. Always follow your healthcare professional's instructions.        The Flu (Influenza)     The virus that causes the flu spreads through the air in droplets when someone who has the flu coughs, sneezes, laughs, or talks.   The flu (influenza) is an  infection that affects your respiratory tract. This tract is made up of your mouth, nose, and lungs, and the passages between them. Unlike a cold, the flu can make you very ill. And it can lead to pneumonia, a serious lung infection. The flu can have serious complications and even cause death.  Who is at risk for the flu?  Anyone can get the flu. But you are more likely to become infected if you:    Have a weakened immune system    Work in a healthcare setting where you may be exposed to flu germs    Live or work with someone who has the flu    Haven t had an annual flu shot  How does the flu spread?  The flu is caused by a virus. The virus spreads through the air in droplets when someone who has the flu coughs, sneezes, laughs, or talks. You can become infected when you inhale these viruses directly. You can also become infected when you touch a surface on which the droplets have landed and then transfer the germs to your eyes, nose, or mouth. Touching used tissues, or sharing utensils, drinking glasses, or a toothbrush from an infected person can expose you to flu viruses, too.  What are the symptoms of the flu?  Flu symptoms tend to come on quickly and may last a few days to a few weeks. They include:    Fever usually higher than 100.4 F  (38 C) and chills    Sore throat and headache    Dry cough    Runny nose    Tiredness and weakness    Muscle aches  Who is at risk for flu complications?  For some people, the flu can be very serious. The risk for complications is greater for:    Children younger than age 5    Adults ages 65 and older    People with a chronic illness such as diabetes or heart, kidney, or lung disease    People who live in a nursing home or long-term care facility   How is the flu treated?  The flu usually gets better after 7 days or so. In some cases, your healthcare provider may prescribe an antiviral medicine. This may help you get well a little sooner. For the medicine to help, you need to  take it as soon as possible (ideally within 48 hours) after your symptoms start. If you develop pneumonia or other serious illness, you may need to stay in the hospital.  Easing flu symptoms    Drink lots of fluids such as water, juice, and warm soup. A good rule is to drink enough so that you urinate your normal amount.    Get plenty of rest.    Ask your healthcare provider what to take for fever and pain.    Call your provider if your fever is 100.4 F (38 C) or higher, or you become dizzy, lightheaded, or short of breath.  Taking steps to protect others    Wash your hands often, especially after coughing or sneezing. Or clean your hands with an alcohol-based hand  containing at least 60% alcohol.    Cough or sneeze into a tissue. Then throw the tissue away and wash your hands. If you don t have a tissue, cough and sneeze into your elbow.    Stay home until at least 24 hours after you no longer have a fever or chills. Be sure the fever isn t being hidden by fever-reducing medicine.    Don t share food, utensils, drinking glasses, or a toothbrush with others.    Ask your healthcare provider if others in your household should get antiviral medicine to help them avoid infection.  How can the flu be prevented?    One of the best ways to avoid the flu is to get a flu vaccine each year. The virus that causes the flu changes from year to year. For that reason, healthcare providers recommend getting the flu vaccine each year, as soon as it's available in your area. The vaccine is given as a shot. Your healthcare provider can tell you which vaccine is right for you. A nasal spray is also available but is not recommended for the 9874-7662 flu season. The CDC says this is because the nasal spray did not seem to protect against the flu over the last several flu seasons. In the past, it was meant for people ages 2 to 49.    Wash your hands often. Frequent handwashing is a proven way to help prevent infection.    Carry an  alcohol-based hand gel containing at least 60% alcohol. Use it when you can't use soap and water. Then wash your hands as soon as you can.    Avoid touching your eyes, nose, and mouth.    At home and work, clean phones, computer keyboards, and toys often with disinfectant wipes.    If possible, avoid close contact with others who have the flu or symptoms of the flu.  Handwashing tips  Handwashing is one of the best ways to prevent many common infections. If you are caring for or visiting someone with the flu, wash your hands each time you enter and leave the room. Follow these steps:    Use warm water and plenty of soap. Rub your hands together well.    Clean the whole hand, including under your nails, between your fingers, and up the wrists.    Wash for at least 15 seconds.    Rinse, letting the water run down your fingers, not up your wrists.    Dry your hands well. Use a paper towel to turn off the faucet and open the door.  Using alcohol-based hand   Alcohol-based hand  are also a good choice. Use them when you can't use soap and water. Follow these steps:    Squeeze about a tablespoon of gel into the palm of one hand.    Rub your hands together briskly, cleaning the backs of your hands, the palms, between your fingers, and up the wrists.    Rub until the gel is gone and your hands are completely dry.  Preventing the flu in healthcare settings  The flu is a special concern for people in hospitals and long-term care facilities. To help prevent the spread of flu, many hospitals and nursing homes take these steps:    Healthcare providers wash their hands or use an alcohol-based hand  before and after treating each patient.    People with the flu have private rooms and bathrooms or share a room with someone with the same infection.    People who are at high risk for the flu but don't have it are encouraged to get the flu and pneumonia vaccines.    All healthcare workers are encouraged or  required to get flu shots.   Date Last Reviewed: 12/1/2016 2000-2017 [employer]. [address] [place] [address], [place] [address]. All rights reserved. This information is not intended as a substitute for professional medical care. Always follow your healthcare professional's instructions.

## 2018-02-05 ENCOUNTER — OFFICE VISIT (OUTPATIENT)
Dept: FAMILY MEDICINE | Facility: CLINIC | Age: 63
End: 2018-02-05
Payer: COMMERCIAL

## 2018-02-05 VITALS
RESPIRATION RATE: 16 BRPM | DIASTOLIC BLOOD PRESSURE: 87 MMHG | OXYGEN SATURATION: 96 % | TEMPERATURE: 98.5 F | BODY MASS INDEX: 25.13 KG/M2 | WEIGHT: 151 LBS | HEART RATE: 83 BPM | SYSTOLIC BLOOD PRESSURE: 122 MMHG

## 2018-02-05 DIAGNOSIS — M25.562 ACUTE PAIN OF LEFT KNEE: Primary | ICD-10-CM

## 2018-02-05 DIAGNOSIS — Z23 NEED FOR PROPHYLACTIC VACCINATION AND INOCULATION AGAINST INFLUENZA: ICD-10-CM

## 2018-02-05 PROCEDURE — 90686 IIV4 VACC NO PRSV 0.5 ML IM: CPT | Performed by: FAMILY MEDICINE

## 2018-02-05 PROCEDURE — 90471 IMMUNIZATION ADMIN: CPT | Performed by: FAMILY MEDICINE

## 2018-02-05 PROCEDURE — 99214 OFFICE O/P EST MOD 30 MIN: CPT | Mod: 25 | Performed by: FAMILY MEDICINE

## 2018-02-05 NOTE — PROGRESS NOTES
SUBJECTIVE:   Micah Kelly is a 62 year old male who presents to clinic today for the following health issues:      Musculoskeletal problem/pain      Duration: 1 month     Description  Location: left knee     Intensity:  moderate    Accompanying signs and symptoms: radiation of pain up and down leg.     History  Previous similar problem: no   Previous evaluation:  none    Precipitating or alleviating factors:  Trauma or overuse: YES  Aggravating factors include: walking and bending     Therapies tried and outcome: Ibuprofen    No injury. Started around a month ago.  Soreness with sitting too long initially.   Getting worse. Hard to bed it.   Once started walking it improves.   No fever. Does not feel warm or swollen. Back of knee is sore.  Took some ibuprofen.   No flying, no long drives.   No known RA in family.     Problem list and histories reviewed & adjusted, as indicated.  Additional history: as documented    Labs reviewed in EPIC    Reviewed and updated as needed this visit by clinical staff       Reviewed and updated as needed this visit by Provider           Social History     Social History     Marital status:      Spouse name: N/A     Number of children: N/A     Years of education: N/A     Social History Main Topics     Smoking status: Never Smoker     Smokeless tobacco: Never Used     Alcohol use 0.6 oz/week     1 Standard drinks or equivalent per week      Comment: ocassional drink     Drug use: No     Sexual activity: No     Other Topics Concern     Parent/Sibling W/ Cabg, Mi Or Angioplasty Before 65f 55m? No     Social History Narrative     No Known Allergies  Patient Active Problem List   Diagnosis     CARDIOVASCULAR SCREENING; LDL GOAL LESS THAN 160     Hypertension goal BP (blood pressure) < 150/90     Need for hepatitis C screening test     Reviewed medications, social history and  past medical and surgical history.    Review of system: for general, respiratory, CVS, GI and  psychiatry negative except for noted above.     EXAM:  /87  Pulse 83  Temp 98.5  F (36.9  C) (Oral)  Resp 16  Wt 151 lb (68.5 kg)  SpO2 96%  BMI 25.13 kg/m2  Constitutional: healthy, alert and no distress   Psychiatric: mentation appears normal and affect normal/bright  JOINT/EXTREMITIES: Left knee-no visible or palpable deformity.  No any anterior joint line tenderness.  In popliteal fossae he has a mild fullness and there is a tender spot.  He does not have any calf tenderness.       ASSESSMENT / PLAN:  (M25.582) Acute pain of left knee  (primary encounter diagnosis)  Comment:.  Most likely a Baker's cyst that may be the culprit of his pain or any other etiology.  Very less likely DVT but cannot be ruled out completely.  We discussed about home treatment and if is not improving obtain an ultrasound to rule out any specific etiology we will consider referring him to sports medicine if his pain is not improving.  He does not need any pain medication.  Plan: US Lower Extremity Venous Duplex Left             (Z23) Need for prophylactic vaccination and inoculation against influenza  Comment:     Plan: FLU VAC, SPLIT VIRUS IM > 3 YO (QUADRIVALENT)         [46147], Vaccine Administration, Initial         [[id number]]               Patient Instructions   Over the counter ibuprofen upto 4 pill three times per day with food.    Please call [phone] to schedule an appointment for ultrasound of your leg.

## 2018-02-05 NOTE — PROGRESS NOTES

## 2018-02-05 NOTE — MR AVS SNAPSHOT
"              After Visit Summary   2/5/2018    Micah Kelly    MRN: [id number]           Patient Information     Date Of Birth          1955        Visit Information        Provider Department      2/5/2018 2:00 PM Jose A Zurita MD [facility]        Today's Diagnoses     Acute pain of left knee    -  1    Need for prophylactic vaccination and inoculation against influenza          Care Instructions    Over the counter ibuprofen upto 4 pill three times per day with food.    Please call [phone] to schedule an appointment for ultrasound of your leg.             Follow-ups after your visit        Future tests that were ordered for you today     Open Future Orders        Priority Expected Expires Ordered    US Lower Extremity Venous Duplex Left Routine  2/5/2019 2/5/2018            Who to contact     If you have questions or need follow up information about today's clinic visit or your schedule please contact [facility] directly at [phone].  Normal or non-critical lab and imaging results will be communicated to you by MyChart, letter or phone within 4 business days after the clinic has received the results. If you do not hear from us within 7 days, please contact the clinic through MyChart or phone. If you have a critical or abnormal lab result, we will notify you by phone as soon as possible.  Submit refill requests through localbacon or call your pharmacy and they will forward the refill request to us. Please allow 3 business days for your refill to be completed.          Additional Information About Your Visit        Classroom [employer] Information     localbacon lets you send messages to your doctor, view your test results, renew your prescriptions, schedule appointments and more. To sign up, go to [email or web address] . Click on \"Log in\" on the left side of the screen, which will take you to the Welcome page. Then click on \"Sign up Now\" on the right side of the page. "     You will be asked to enter the access code listed below, as well as some personal information. Please follow the directions to create your username and password.     Your access code is: [email or web address]  Expires: 2018  4:49 PM     Your access code will  in 90 days. If you need help or a new code, please call your [place] clinic or [phone].        Care EveryWhere ID     This is your Care EveryWhere ID. This could be used by other organizations to access your [place] medical records  [id number]        Your Vitals Were     Pulse Temperature Respirations Pulse Oximetry BMI (Body Mass Index)       83 98.5  F (36.9  C) (Oral) 16 96% 25.13 kg/m2        Blood Pressure from Last 3 Encounters:   18 122/87   18 116/80   10/27/17 118/81    Weight from Last 3 Encounters:   18 151 lb (68.5 kg)   18 150 lb (68 kg)   10/27/17 149 lb 12 oz (67.9 kg)              We Performed the Following     FLU VAC, SPLIT VIRUS IM > 3 YO (QUADRIVALENT) [[id number]]     Vaccine Administration, Initial [[id number]]        Primary Care Provider Office Phone # Fax #    Gali Kennedy [phone] [address] [facility] [address]        Equal Access to Services     MARCOS SHI : Hadii collin castroo Sodeloris, waaxda luqadaha, qaybta kaalmada sandi rodríguez. So [facility] [phone].    ATENCIÓN: Si habla español, tiene a garcia disposición servicios gratuitos de asistencia lingüística. Paddy al [phone].    We comply with applicable federal civil rights laws and [place] laws. We do not discriminate on the basis of race, color, national origin, age, disability, sex, sexual orientation, or gender identity.            Thank you!     Thank you for choosing [facility]  for your care. Our goal is always to provide you with excellent care. Hearing back from our patients is one way we can continue to improve our services. Please take a few  minutes to complete the written survey that you may receive in the mail after your visit with us. Thank you!             Your Updated Medication List - Protect others around you: Learn how to safely use, store and throw away your medicines at www.disposemymeds.org.          This list is accurate as of 2/5/18  2:31 PM.  Always use your most recent med list.                   Brand Name Dispense Instructions for use Diagnosis    albuterol 108 (90 BASE) MCG/ACT Inhaler    PROAIR HFA/PROVENTIL HFA/VENTOLIN HFA    1 Inhaler    Inhale 2 puffs into the lungs every 6 hours as needed for shortness of breath / dyspnea or wheezing    Viral URI with cough       amLODIPine 2.5 MG tablet    NORVASC    90 tablet    TAKE 1 TABLET (2.5 MG) BY MOUTH DAILY    Hypertension goal BP (blood pressure) < 150/90       ASPIRIN PO      Take 81 mg by mouth daily        azithromycin 250 MG tablet    ZITHROMAX    6 tablet    Two tablets first day, then one tablet daily for four days.    Cough       FISH OIL PO           * guaiFENesin-codeine 100-10 MG/5ML Soln solution    ROBITUSSIN AC    120 mL    Take 5 mLs by mouth every 4 hours as needed for cough    Cough       * guaiFENesin-codeine 100-10 MG/5ML Soln solution    ROBITUSSIN AC    120 mL    Take 5-10 mLs by mouth nightly as needed for cough    Cough       lisinopril 20 MG tablet    PRINIVIL/ZESTRIL    90 tablet    TAKE 1 TABLET (20 MG) BY MOUTH DAILY. NEEDS TO BE SEEN.    Hypertension goal BP (blood pressure) < 150/90       VITAMIN C PO      Take 1,000 mg by mouth daily        VITAMIN D (CHOLECALCIFEROL) PO      Take 400 Units by mouth daily        * Notice:  This list has 2 medication(s) that are the same as other medications prescribed for you. Read the directions carefully, and ask your doctor or other care provider to review them with you.

## 2018-02-05 NOTE — PROGRESS NOTES

## 2018-02-05 NOTE — PATIENT INSTRUCTIONS
Over the counter ibuprofen upto 4 pill three times per day with food.    Please call [phone] to schedule an appointment for ultrasound of your leg.

## 2018-02-07 ENCOUNTER — HOSPITAL ENCOUNTER (OUTPATIENT)
Dept: ULTRASOUND IMAGING | Facility: CLINIC | Age: 63
Discharge: HOME | End: 2018-02-07
Attending: FAMILY MEDICINE | Admitting: FAMILY MEDICINE
Payer: COMMERCIAL

## 2018-02-07 DIAGNOSIS — M25.562 ACUTE PAIN OF LEFT KNEE: ICD-10-CM

## 2018-02-07 PROCEDURE — 93971 EXTREMITY STUDY: CPT | Mod: LT

## 2018-02-27 ENCOUNTER — RADIANT APPOINTMENT (OUTPATIENT)
Dept: GENERAL RADIOLOGY | Facility: CLINIC | Age: 63
End: 2018-02-27
Attending: FAMILY MEDICINE
Payer: COMMERCIAL

## 2018-02-27 ENCOUNTER — OFFICE VISIT (OUTPATIENT)
Dept: FAMILY MEDICINE | Facility: CLINIC | Age: 63
End: 2018-02-27
Payer: COMMERCIAL

## 2018-02-27 VITALS
WEIGHT: 151.75 LBS | BODY MASS INDEX: 25.28 KG/M2 | RESPIRATION RATE: 16 BRPM | OXYGEN SATURATION: 96 % | DIASTOLIC BLOOD PRESSURE: 86 MMHG | HEART RATE: 86 BPM | HEIGHT: 65 IN | TEMPERATURE: 98 F | SYSTOLIC BLOOD PRESSURE: 129 MMHG

## 2018-02-27 DIAGNOSIS — M25.562 ACUTE PAIN OF LEFT KNEE: Primary | ICD-10-CM

## 2018-02-27 DIAGNOSIS — M25.562 ACUTE PAIN OF LEFT KNEE: ICD-10-CM

## 2018-02-27 PROCEDURE — 99213 OFFICE O/P EST LOW 20 MIN: CPT | Performed by: FAMILY MEDICINE

## 2018-02-27 PROCEDURE — 73562 X-RAY EXAM OF KNEE 3: CPT | Mod: LT

## 2018-02-27 RX ORDER — TRAMADOL HYDROCHLORIDE 50 MG/1
50-100 TABLET ORAL EVERY 8 HOURS PRN
Qty: 10 TABLET | Refills: 0 | Status: SHIPPED | OUTPATIENT
Start: 2018-02-27 | End: 2018-11-19

## 2018-02-27 NOTE — PROGRESS NOTES
SUBJECTIVE:   Micah Kelly is a 62 year old male who presents to clinic today for the following health issues:  Knee Pain    Onset: 1 month(s) ago     Description:   Left knee  Location: posterior (back)  Character: Sharp when going from sitting to standing and Dull ache rest of day   Any injury: no       Describe injury:  n/a  Did pain occur at time of injury: not applicable  Was activity continued after injury: no  Able to bear weight immediately after injury: NO- walking with small limp    Accompanying Signs and Symptoms:         Swelling: no        Popping: no        Locking: no        Does knee feel like it is going to give out: YES        Redness or warmth: no    History:          Any previous injury to knee(s): no        Any other joint pain: no  Any previous knee surgery: no  Any previous MRI or X-ray: none    Therapies tried and outcome: NSAIDs with total relief and bengay with no relief    Job is physical and getting tired.     He did not notice any improvement in any of his symptoms.  He uses ibuprofen with some benefit       Problem list and histories reviewed & adjusted, as indicated.  Additional history: as documented    Labs reviewed in EPIC    Reviewed and updated as needed this visit by clinical staff       Reviewed and updated as needed this visit by Provider           Social History     Social History     Marital status:      Spouse name: N/A     Number of children: N/A     Years of education: N/A     Social History Main Topics     Smoking status: Never Smoker     Smokeless tobacco: Never Used     Alcohol use 0.6 oz/week     1 Standard drinks or equivalent per week      Comment: ocassional drink     Drug use: No     Sexual activity: No     Other Topics Concern     Parent/Sibling W/ Cabg, Mi Or Angioplasty Before 65f 55m? No     Social History Narrative     No Known Allergies  Patient Active Problem List   Diagnosis     CARDIOVASCULAR SCREENING; LDL GOAL LESS THAN 160     Hypertension  "goal BP (blood pressure) < 150/90     Need for hepatitis C screening test     Reviewed medications, social history and  past medical and surgical history.    Review of system: for general, respiratory, CVS, GI and psychiatry negative except for noted above.     EXAM:  /86 (Cuff Size: Adult Regular)  Pulse 86  Temp 98  F (36.7  C) (Oral)  Resp 16  Ht 5' 5\" (1.651 m)  Wt 151 lb 12 oz (68.8 kg)  SpO2 96%  BMI 25.25 kg/m2  Constitutional: healthy, alert and no distress   Psychiatric: mentation appears normal and affect normal/bright  Left knee-no visible or palpable deformity.  Reported popliteal fossa tenderness but no deformity.    ASSESSMENT / PLAN:  (M25.707) Acute pain of left knee  (primary encounter diagnosis)  Comment: Today we obtained an x-ray which looks normal on my review.  Due to persistent nature of his pain it is reasonable to see sports medicine for further evaluation.  We have already obtain an ultrasound to rule out Baker's cyst or DVT and it was negative for both.  He is not requesting any medication but we discussed about using NSAIDs as needed and nighttime okay to use some narcotics if needed.  He is reluctant about narcotics but would like to keep some handy.  We talked about side effects and we discussed we do not feel controlled substance without seen.  We will try some low-dose tramadol and see how he does.  Plan: ORTHO [facility] REFERRAL, traMADol (ULTRAM) 50        "

## 2018-02-27 NOTE — NURSING NOTE
"Chief Complaint   Patient presents with     Musculoskeletal Problem     left knee        Initial /86 (Cuff Size: Adult Regular)  Pulse 86  Temp 98  F (36.7  C) (Oral)  Resp 16  Ht 5' 5\" (1.651 m)  Wt 151 lb 12 oz (68.8 kg)  SpO2 96%  BMI 25.25 kg/m2 Estimated body mass index is 25.25 kg/(m^2) as calculated from the following:    Height as of this encounter: 5' 5\" (1.651 m).    Weight as of this encounter: 151 lb 12 oz (68.8 kg).  Medication Reconciliation: complete     Luly Mix, CHRISTELLE      "

## 2018-02-27 NOTE — MR AVS SNAPSHOT
After Visit Summary   2/27/2018    Micah Kelly    MRN: [id number]           Patient Information     Date Of Birth          1955        Visit Information        Provider Department      2/27/2018 10:20 Jose A Bradley MD [facility]        Today's Diagnoses     Acute pain of left knee    -  1       Follow-ups after your visit        Additional Services     ORTHO  REFERRAL       [facility] is referring you to the Orthopedic  Services at [facility].       The  Representative will assist you in the coordination of your Orthopedic and Musculoskeletal Care as prescribed by your physician.    The  Representative will call you within 1 business day to help schedule your appointment, or you may contact the  Representative at:    All areas ~ [phone]     Type of Referral : Non Surgical       Timeframe requested: Routine    Coverage of these services is subject to the terms and limitations of your health insurance plan.  Please call member services at your health plan with any benefit or coverage questions.      If X-rays, CT or MRI's have been performed, please contact the facility where they were done to arrange for , prior to your scheduled appointment.  Please bring this referral request to your appointment and present it to your specialist.                  Who to contact     If you have questions or need follow up information about today's clinic visit or your schedule please contact [facility] directly at [phone].  Normal or non-critical lab and imaging results will be communicated to you by MyChart, letter or phone within 4 business days after the clinic has received the results. If you do not hear from us within 7 days, please contact the clinic through MyChart or phone. If you have a critical or abnormal lab result, we will notify you by phone as soon  "as possible.  Submit refill requests through [employer] or call your pharmacy and they will forward the refill request to us. Please allow 3 business days for your refill to be completed.          Additional Information About Your Visit        [employer] Information     [employer] lets you send messages to your doctor, view your test results, renew your prescriptions, schedule appointments and more. To sign up, go to [email or web address] . Click on \"Log in\" on the left side of the screen, which will take you to the Welcome page. Then click on \"Sign up Now\" on the right side of the page.     You will be asked to enter the access code listed below, as well as some personal information. Please follow the directions to create your username and password.     Your access code is: [email or web address]  Expires: 2018  4:49 PM     Your access code will  in 90 days. If you need help or a new code, please call your [facility] clinic or [phone].        Care EveryWhere ID     This is your Care EveryWhere ID. This could be used by other organizations to access your [facility] medical records  [id number]        Your Vitals Were     Pulse Temperature Respirations Height Pulse Oximetry BMI (Body Mass Index)    86 98  F (36.7  C) (Oral) 16 5' 5\" (1.651 m) 96% 25.25 kg/m2       Blood Pressure from Last 3 Encounters:   18 129/86   18 122/87   18 116/80    Weight from Last 3 Encounters:   18 151 lb 12 oz (68.8 kg)   18 151 lb (68.5 kg)   18 150 lb (68 kg)              We Performed the Following     ORTHO  REFERRAL        Primary Care Provider Office Phone # Fax #    Gali Kennedy [phone] [address] [place] [address]        Equal Access to Services     [facility] : Mary Yuen, wabryan rosario, qaybsandi lee . So [facility] [phone].    ATENCIÓN: Si dave drummond " disposición servicios gratuitos de asistencia lingüística. Paddy delacruz [phone].    We comply with applicable federal civil rights laws and [place] laws. We do not discriminate on the basis of race, color, national origin, age, disability, sex, sexual orientation, or gender identity.            Thank you!     Thank you for choosing [facility]  for your care. Our goal is always to provide you with excellent care. Hearing back from our patients is one way we can continue to improve our services. Please take a few minutes to complete the written survey that you may receive in the mail after your visit with us. Thank you!             Your Updated Medication List - Protect others around you: Learn how to safely use, store and throw away your medicines at www.disposemymeds.org.          This list is accurate as of 2/27/18 10:53 AM.  Always use your most recent med list.                   Brand Name Dispense Instructions for use Diagnosis    amLODIPine 2.5 MG tablet    NORVASC    90 tablet    TAKE 1 TABLET (2.5 MG) BY MOUTH DAILY    Hypertension goal BP (blood pressure) < 150/90       ASPIRIN PO      Take 81 mg by mouth daily        FISH OIL PO           * guaiFENesin-codeine 100-10 MG/5ML Soln solution    ROBITUSSIN AC    120 mL    Take 5 mLs by mouth every 4 hours as needed for cough    Cough       * guaiFENesin-codeine 100-10 MG/5ML Soln solution    ROBITUSSIN AC    120 mL    Take 5-10 mLs by mouth nightly as needed for cough    Cough       lisinopril 20 MG tablet    PRINIVIL/ZESTRIL    90 tablet    TAKE 1 TABLET (20 MG) BY MOUTH DAILY. NEEDS TO BE SEEN.    Hypertension goal BP (blood pressure) < 150/90       VITAMIN C PO      Take 1,000 mg by mouth daily        VITAMIN D (CHOLECALCIFEROL) PO      Take 400 Units by mouth daily        * Notice:  This list has 2 medication(s) that are the same as other medications prescribed for you. Read the directions carefully, and ask your doctor or other care  provider to review them with you.

## 2018-02-28 ENCOUNTER — OFFICE VISIT (OUTPATIENT)
Dept: ORTHOPEDICS | Facility: CLINIC | Age: 63
End: 2018-02-28
Payer: COMMERCIAL

## 2018-02-28 VITALS — BODY MASS INDEX: 25.16 KG/M2 | WEIGHT: 151 LBS | RESPIRATION RATE: 16 BRPM | HEIGHT: 65 IN

## 2018-02-28 DIAGNOSIS — M17.12 PRIMARY OSTEOARTHRITIS OF LEFT KNEE: Primary | ICD-10-CM

## 2018-02-28 RX ORDER — TRIAMCINOLONE ACETONIDE 40 MG/ML
40 INJECTION, SUSPENSION INTRA-ARTICULAR; INTRAMUSCULAR ONCE
Qty: 1 ML | Refills: 0 | OUTPATIENT
Start: 2018-02-28 | End: 2018-02-28

## 2018-02-28 NOTE — NURSING NOTE
[address] [address]  Dept: [phone]  ______________________________________________________________________________    Patient: Micah Kelly   : 1955   MRN: [id number]   2018    INVASIVE PROCEDURE SAFETY CHECKLIST    Date: 2017   Procedure:Left knee kenalog injection  Patient Name: Micah Kelly  MRN: [id number]  YOB: 1955    Action: Complete sections as appropriate. Any discrepancy results in a HARD COPY until resolved.     PRE PROCEDURE:  Patient ID verified with 2 identifiers (name and  or MRN): Yes  Procedure and site verified with patient/designee (when able): Yes  Accurate consent documentation in medical record: Yes  H&P (or appropriate assessment) documented in medical record: Yes  H&P must be up to 20 days prior to procedure and updates within 24 hours of procedure as applicable: NA  Relevant diagnostic and radiology test results appropriately labeled and displayed as applicable: Yes  Procedure site(s) marked with provider initials: NA    TIMEOUT:  Time-Out performed immediately prior to starting procedure, including verbal and active participation of all team members addressing the following:Yes  * Correct patient identify  * Confirmed that the correct side and site are marked  * An accurate procedure consent form  * Agreement on the procedure to be done  * Correct patient position  * Relevant images and results are properly labeled and appropriately displayed  * The need to administer antibiotics or fluids for irrigation purposes during the procedure as applicable   * Safety precautions based on patient history or medication use    DURING PROCEDURE: Verification of correct person, site, and procedures any time the responsibility for care of the patient is transferred to another member of the care team.     The following medication was given:     MEDICATION:  Kenalog 40 mg; 4ml of 1%  lidocaine  ROUTE: Intra-Articular  SITE: Left knee  DOSE: Kenalog 40 mg; 4ml of 1% lidocaine  LOT #: Kenalog: [id number]; Lidocaine: [id number]  :Kenalog: [employer]; Lidocaine: [employer]  EXPIRATION DATE: Kenalo/19; Lidocaine:   NDC#: [id number]; Lidocaine: 09175-776-84   Was there drug waste? Yes  Amount of drug waste (mL): 6.  Reason for waste:  As per MD Sonny Faulkner, ATC  2018

## 2018-02-28 NOTE — MR AVS SNAPSHOT
After Visit Summary   2/28/2018    Micah Kelly    MRN: [id number]           Patient Information     Date Of Birth          1955        Visit Information        Provider Department      2/28/2018 1:45 PM Jacque Aguilera MD [facility]        Today's Diagnoses     Primary osteoarthritis of left knee    -  1       Follow-ups after your visit        Additional Services     PHYSICAL THERAPY REFERRAL (Internal)       Physical Therapy Referral                  Your next 10 appointments already scheduled     Mar 05, 2018 12:50 PM CST   (Arrive by 12:35 PM)   CYN Extremity with Alan Gomez, PT   [facility] ([facility]  )    [address] [place] [address] [address]  [place] [address] [phone]            Mar 12, 2018 12:50 PM CDT   CYN Extremity with Robinson Truong PT   [facility] ([facility]  )    [address] [place] [address] [address]  [place] [id number] [phone]            Mar 19, 2018  1:30 PM CDT   CYN Extremity with Alan Gomez, PT   [facility] ([facility]  )    [address] [place] [address] [address]  [place] [id number] [phone]            Mar 26, 2018 12:50 PM CDT   CYN Extremity with Alan Gomez, PT   [facility] ([facility] [place]  )    [address] [place] [address] [address]  [place] [address] [phone]            Apr 11, 2018  2:00 PM CDT   (Arrive by 1:45 PM)   Return Visit with Jacque Aguilera MD   [facility] ([facility])    [address] [place] [address] [phone]              Who to contact     Please call your clinic at [phone] to:    Ask questions about your health    Make or cancel appointments    Discuss your medicines    Learn about your test results    Speak to your doctor            Additional Information  "About Your Visit        [employer] Information     Zazuba is an electronic gateway that provides easy, online access to your medical records. With Zazuba, you can request a clinic appointment, read your test results, renew a prescription or communicate with your care team.     To sign up for [employer] visit the website at [email or web address]   You will be asked to enter the access code listed below, as well as some personal information. Please follow the directions to create your username and password.     Your access code is: [email or web address]  Expires: 2018  4:49 PM     Your access code will  in 90 days. If you need help or a new code, please contact your [facility] or call [phone] for assistance.        Care EveryWhere ID     This is your Care EveryWhere ID. This could be used by other organizations to access your [facility] medical records  [id number]        Your Vitals Were     Respirations Height BMI (Body Mass Index)             16 5' 5\" (1.651 m) 25.13 kg/m2          Blood Pressure from Last 3 Encounters:   18 129/86   18 122/87   18 116/80    Weight from Last 3 Encounters:   18 151 lb (68.5 kg)   18 151 lb 12 oz (68.8 kg)   18 151 lb (68.5 kg)              We Performed the Following     Large Joint/Bursa injection and/or drainage - Unilateral (Shoulder, Knee) [41031]     PHYSICAL THERAPY REFERRAL (Internal)     TRIAMCINOLONE ACET INJ NOS          Today's Medication Changes          These changes are accurate as of 18  3:03 PM.  If you have any questions, ask your nurse or doctor.               Start taking these medicines.        Dose/Directions    triamcinolone acetonide 40 MG/ML injection   Commonly known as:  KENALOG   Used for:  Primary osteoarthritis of left knee        Dose:  40 mg   1 mL (40 mg) by INTRA-ARTICULAR route once for 1 dose   Quantity:  1 mL   Refills:  0            Where to get your medicines    "   Some of these will need a paper prescription and others can be bought over the counter.  Ask your nurse if you have questions.     You don't need a prescription for these medications     triamcinolone acetonide 40 MG/ML injection                Primary Care Provider Office Phone # Fax #    Gali Kennedy [phone] [address]  [facility] [place] [address]        Equal Access to Services     MARCOS SHI : Hadii aad ku hadasho Soomaali, waaxda luqadaha, qaybta kaalmada adeegyada, waxay idiin hayaan adeeg kharash la'aan ah. So [facility] [phone].    ATENCIÓN: Si jacey nelson, tiene a garcia disposición servicios gratuitos de asistencia lingüística. [facility] [phone].    We comply with applicable federal civil rights laws and [place] laws. We do not discriminate on the basis of race, color, national origin, age, disability, sex, sexual orientation, or gender identity.            Thank you!     Thank you for choosing [facility]  for your care. Our goal is always to provide you with excellent care. Hearing back from our patients is one way we can continue to improve our services. Please take a few minutes to complete the written survey that you may receive in the mail after your visit with us. Thank you!             Your Updated Medication List - Protect others around you: Learn how to safely use, store and throw away your medicines at www.disposemymeds.org.          This list is accurate as of 2/28/18  3:03 PM.  Always use your most recent med list.                   Brand Name Dispense Instructions for use Diagnosis    amLODIPine 2.5 MG tablet    NORVASC    90 tablet    TAKE 1 TABLET (2.5 MG) BY MOUTH DAILY    Hypertension goal BP (blood pressure) < 150/90       ASPIRIN PO      Take 81 mg by mouth daily        FISH OIL PO           lisinopril 20 MG tablet    PRINIVIL/ZESTRIL    90 tablet    TAKE 1 TABLET (20 MG) BY MOUTH DAILY. NEEDS TO BE SEEN.    Hypertension goal BP  (blood pressure) < 150/90       traMADol 50 MG tablet    ULTRAM    10 tablet    Take 1-2 tablets ( mg) by mouth every 8 hours as needed for moderate pain    Acute pain of left knee       triamcinolone acetonide 40 MG/ML injection    KENALOG    1 mL    1 mL (40 mg) by INTRA-ARTICULAR route once for 1 dose    Primary osteoarthritis of left knee       VITAMIN C PO      Take 1,000 mg by mouth daily        VITAMIN D (CHOLECALCIFEROL) PO      Take 400 Units by mouth daily

## 2018-02-28 NOTE — LETTER
"  2/28/2018      RE: Micah Kelly  [address]  [facility] [address]        Subjective:   Micah Kelly is a 62 year old male who is here complaining of left posterior knee pain. No JORGE ALBERTO. He delivers newspapers, conductor.   He started to feel left knee pain about a month ago. No injury. The pain is located in medial to posterior of the left knee. He feels pain when he goes from sitting to standing, or when he tried to get up from a bed in the morning. He needs stretching to loosen up the knee. He is physically active and used to bike a lot. He tried ibuprofen, which seemed to be helping.    Background:   Date of injury: NA   Duration of symptoms: 1 month  Mechanism of Injury: Insidious Onset; Unknown   Aggravating factors: Sitting to standing and being over active  Relieving Factors: activity, ibuprofen, ice  Prior Evaluation: Prior Physician Evalutation:   and X-rays    PAST MEDICAL, SOCIAL, SURGICAL AND FAMILY HISTORY: He  has a past medical history of CARDIOVASCULAR SCREENING; LDL GOAL LESS THAN 160 (2/2/2016) and Hypertension.  He  has a past surgical history that includes vasectomy (1986).  His family history includes Colon Cancer (age of onset: 88) in his mother; Hypertension in his father; Peptic Ulcer Disease in his mother.  He reports that he has never smoked. He has never used smokeless tobacco. He reports that he drinks about 0.6 oz of alcohol per week  He reports that he does not use illicit drugs.    ALLERGIES: He has No Known Allergies.    CURRENT MEDICATIONS: He has a current medication list which includes the following prescription(s): tramadol, lisinopril, amlodipine, aspirin, cholecalciferol, ascorbic acid, and omega-3 fatty acids.     REVIEW OF SYSTEMS: 3 point review of systems is negative except as noted above.     Exam:   Resp 16  Ht 5' 5\" (1.651 m)  Wt 151 lb (68.5 kg)  BMI 25.13 kg/m2     CONSTITUTIONAL: healthy, alert and no distress  HEAD: Normocephalic. No masses, " lesions, tenderness or abnormalities  SKIN: no suspicious lesions or rashes  GAIT: normal  NEUROLOGIC: Non-focal  PSYCHIATRIC: affect normal/bright and mentation appears normal.    MUSCULOSKELETAL: Left knee: ROM 0-100 (restricted due to pain). No valgus/varus instability, Negative ADS/PDS, Muscle strength all 5/5, no sensory deficit  Straight leg raising negative, Left hip ROM full, no pain with motion       Assessment/Plan:   Micah Kelly is a 62 year old male with PMH of HTN who is here complaining of left knee pain.    1) Mild left knee OA  Symptoms most likely knee OA. Left nabil X-ray also shows subchondral sclerosis in medial tibia.  - PT referral  - continue to use NSAIDs prn  - Procedure:  Discussed risks and benefits of knee injection and patient agreed. Left knee injection (kenalog 1ml, lidocaine 4ml) was performed, lateral approach.    X-RAY INTERPRETATION:   No Knee X-Rays indicated  X-Ray of the Left Knee: 3-view, Valentin, lateral, sunrise   ordered and interpreted in the office today was positive for subchondral sclerosis at medial tibia.    RTC in 6 weeks.    Discussed with Dr. Aguilera.    Scribed by Sunshine Solomon MD PhD  Internal Medicine PGY-1    Pt was seen and examined with the resident. I was present for the injection of the left knee.  MD Jacque Ward MD

## 2018-02-28 NOTE — PROGRESS NOTES
" Subjective:   Micah Kelly is a 62 year old male who is here complaining of left posterior knee pain. No JORGE ALBERTO. He delivers newspapers, conductor.   He started to feel left knee pain about a month ago. No injury. The pain is located in medial to posterior of the left knee. He feels pain when he goes from sitting to standing, or when he tried to get up from a bed in the morning. He needs stretching to loosen up the knee. He is physically active and used to bike a lot. He tried ibuprofen, which seemed to be helping.    Background:   Date of injury: NA   Duration of symptoms: 1 month  Mechanism of Injury: Insidious Onset; Unknown   Aggravating factors: Sitting to standing and being over active  Relieving Factors: activity, ibuprofen, ice  Prior Evaluation: Prior Physician Evalutation:   and X-rays    PAST MEDICAL, SOCIAL, SURGICAL AND FAMILY HISTORY: He  has a past medical history of CARDIOVASCULAR SCREENING; LDL GOAL LESS THAN 160 (2/2/2016) and Hypertension.  He  has a past surgical history that includes vasectomy (1986).  His family history includes Colon Cancer (age of onset: 88) in his mother; Hypertension in his father; Peptic Ulcer Disease in his mother.  He reports that he has never smoked. He has never used smokeless tobacco. He reports that he drinks about 0.6 oz of alcohol per week  He reports that he does not use illicit drugs.    ALLERGIES: He has No Known Allergies.    CURRENT MEDICATIONS: He has a current medication list which includes the following prescription(s): tramadol, lisinopril, amlodipine, aspirin, cholecalciferol, ascorbic acid, and omega-3 fatty acids.     REVIEW OF SYSTEMS: 3 point review of systems is negative except as noted above.     Exam:   Resp 16  Ht 5' 5\" (1.651 m)  Wt 151 lb (68.5 kg)  BMI 25.13 kg/m2     CONSTITUTIONAL: healthy, alert and no distress  HEAD: Normocephalic. No masses, lesions, tenderness or abnormalities  SKIN: no suspicious lesions or rashes  GAIT: " normal  NEUROLOGIC: Non-focal  PSYCHIATRIC: affect normal/bright and mentation appears normal.    MUSCULOSKELETAL: Left knee: ROM 0-100 (restricted due to pain). No valgus/varus instability, Negative ADS/PDS, Muscle strength all 5/5, no sensory deficit  Straight leg raising negative, Left hip ROM full, no pain with motion       Assessment/Plan:   Micah Kelly is a 62 year old male with PMH of HTN who is here complaining of left knee pain.    1) Mild left knee OA  Symptoms most likely knee OA. Left nabil X-ray also shows subchondral sclerosis in medial tibia.  - PT referral  - continue to use NSAIDs prn  - Procedure:  Discussed risks and benefits of knee injection and patient agreed. Left knee injection (kenalog 1ml, lidocaine 4ml) was performed, lateral approach.    X-RAY INTERPRETATION:   No Knee X-Rays indicated  X-Ray of the Left Knee: 3-view, Valentin, lateral, sunrise   ordered and interpreted in the office today was positive for subchondral sclerosis at medial tibia.    RTC in 6 weeks.    Discussed with Dr. Aguilera.    Scribed by Sunshine Solomon MD PhD  Internal Medicine PGY-1    Pt was seen and examined with the resident. I was present for the injection of the left knee.  KYUNG Aguilera MD

## 2018-03-05 ENCOUNTER — THERAPY VISIT (OUTPATIENT)
Dept: PHYSICAL THERAPY | Facility: CLINIC | Age: 63
End: 2018-03-05
Payer: COMMERCIAL

## 2018-03-05 DIAGNOSIS — M25.562 ACUTE PAIN OF LEFT KNEE: Primary | ICD-10-CM

## 2018-03-05 PROCEDURE — G8979 MOBILITY GOAL STATUS: HCPCS | Mod: GP | Performed by: PHYSICAL THERAPIST

## 2018-03-05 PROCEDURE — 97110 THERAPEUTIC EXERCISES: CPT | Mod: GP | Performed by: PHYSICAL THERAPIST

## 2018-03-05 PROCEDURE — 97161 PT EVAL LOW COMPLEX 20 MIN: CPT | Mod: GP | Performed by: PHYSICAL THERAPIST

## 2018-03-05 PROCEDURE — G8978 MOBILITY CURRENT STATUS: HCPCS | Mod: GP | Performed by: PHYSICAL THERAPIST

## 2018-03-05 ASSESSMENT — ACTIVITIES OF DAILY LIVING (ADL)
RISE FROM A CHAIR: ACTIVITY IS MINIMALLY DIFFICULT
AS_A_RESULT_OF_YOUR_KNEE_INJURY,_HOW_WOULD_YOU_RATE_YOUR_CURRENT_LEVEL_OF_DAILY_ACTIVITY?: NEARLY NORMAL
GO UP STAIRS: ACTIVITY IS SOMEWHAT DIFFICULT
KNEEL ON THE FRONT OF YOUR KNEE: ACTIVITY IS FAIRLY DIFFICULT
PAIN: THE SYMPTOM AFFECTS MY ACTIVITY SLIGHTLY
WALK: ACTIVITY IS SOMEWHAT DIFFICULT
SQUAT: ACTIVITY IS FAIRLY DIFFICULT
STIFFNESS: THE SYMPTOM AFFECTS MY ACTIVITY SLIGHTLY
STAND: ACTIVITY IS SOMEWHAT DIFFICULT
RAW_SCORE: 43
WEAKNESS: THE SYMPTOM AFFECTS MY ACTIVITY SLIGHTLY
LIMPING: THE SYMPTOM AFFECTS MY ACTIVITY MODERATELY
SIT WITH YOUR KNEE BENT: ACTIVITY IS MINIMALLY DIFFICULT
GO DOWN STAIRS: ACTIVITY IS FAIRLY DIFFICULT
GIVING WAY, BUCKLING OR SHIFTING OF KNEE: I HAVE THE SYMPTOM BUT IT DOES NOT AFFECT MY ACTIVITY
HOW_WOULD_YOU_RATE_THE_CURRENT_FUNCTION_OF_YOUR_KNEE_DURING_YOUR_USUAL_DAILY_ACTIVITIES_ON_A_SCALE_FROM_0_TO_100_WITH_100_BEING_YOUR_LEVEL_OF_KNEE_FUNCTION_PRIOR_TO_YOUR_INJURY_AND_0_BEING_THE_INABILITY_TO_PERFORM_ANY_OF_YOUR_USUAL_DAILY_ACTIVITIES?: 50
SWELLING: I DO NOT HAVE THE SYMPTOM
KNEE_ACTIVITY_OF_DAILY_LIVING_SCORE: 61.43
HOW_WOULD_YOU_RATE_THE_OVERALL_FUNCTION_OF_YOUR_KNEE_DURING_YOUR_USUAL_DAILY_ACTIVITIES?: ABNORMAL
KNEE_ACTIVITY_OF_DAILY_LIVING_SUM: 43

## 2018-03-06 NOTE — PROGRESS NOTES
[facility] Initial Evaluation    Subjective:  Patient is a 62 year old male presenting with rehab left knee hpi.   Micah Kelly is a 62 year old male with a left knee condition.  Condition occurred with:  Insidious onset.  Condition occurred: for unknown reasons.  This is a new condition  Patient notes posterior/medial L knee pain since about 1/15/18 without specific known cause.  He did see MD and had x-rays (only finding was sclerotic changes subchondral medial tibial plateau, otherwise negative).  He did have injection and was subsequently referred to PT..    Site of Pain: posteromedial knee, hamstring/popliteal/proxmial medial gastroc area.    Pain is described as aching and is intermittent and reported as 4/10.  Associated symptoms:  Loss of strength and loss of motion/stiffness. Pain is worse in the A.M..  Exacerbated by: arising from bed in the morning, arising from prolonged sitting, sleep distrubed intermitently, descendign stairs, kneeling/squatting. Relieved by: tramadol, ice, ibuprofen.  Since onset symptoms are gradually improving.  Special tests:  X-ray.  Previous treatment: injection.  There was mild improvement following previous treatment.  General health as reported by patient is good.  Pertinent medical history includes:  High blood pressure.  Medical allergies: no.  Other surgeries include:  None reported.  Current medications:  Muscle relaxants and high blood pressure medication.  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  Patient is working in normal job without restrictions.  Primary job tasks include:  Driving, lifting and prolonged sitting.    Barriers include:  None as reported by the patient.    Red flags:  None as reported by the patient.                        Objective:  KNEE:    PROM:   L  R   Hyperextension 7 end range pain 8   Extension 0 0   Flexion 140 end range pain 148     Strength:   L R   HIP     Flex 5 5   Ext 4+ with trace  pain post 4+   Abd 4 with trace pain post 4+   KNEE     Flex 5, No pain sitting, but trace pain when prone 5   Ext 5 trace pain joint line 5     Special tests:   L R   Anterior Drawer negative    Posterior Drawer negative    Lachman's negative    Valgus 0 degrees negative    Valgus 30 degrees negative    Varus 0 degrees negative    Varus 30 degrees negative    Stanton's Trace pain    Appley's negative    Lateral Compression     Patellar Compression         Palpation: tender medial hamstring tendon, medial popliteal area/popliteus, maybe some firmness posteromedial, but no real prominent in common area of Baker's cyst    Functional: pain L knee with deep squatting  Trialed repeated movements: seated ext x 15 decreased limp about same with deep squatting, repeated flexion x 15 increased limp and squatting about same.  Will trial repeated extension of knee with DF AROM unloaded and monitor for effect.     Gait: limps and lacks terminal knee extension at times.     System    Physical Exam    General     ROS    Assessment/Plan:    Patient is a 62 year old male with left side knee complaints.    Patient has the following significant findings with corresponding treatment plan.                Diagnosis 1:  L posteromedial knee pain, suspect medial hamstring/popliteus tendonitis (difficult to rule out/in possibility of Baker's cyst--he notes US without identifying)    Pain -  hot/cold therapy, US, manual therapy and directional preference exercise  Decreased ROM/flexibility - manual therapy and therapeutic exercise  Decreased strength - therapeutic exercise and therapeutic activities  Decreased proprioception - neuro re-education and therapeutic activities  Impaired gait - gait training  Decreased function - therapeutic activities    Therapy Evaluation Codes:   1) History comprised of:   Personal factors that impact the plan of care:      None.    Comorbidity factors that impact the plan of care are:      None.      Medications impacting care: None.  2) Examination of Body Systems comprised of:   Body structures and functions that impact the plan of care:      Knee.   Activity limitations that impact the plan of care are:      Bending, Sitting, Squatting/kneeling, Stairs, Walking and Sleeping.  3) Clinical presentation characteristics are:   Stable/Uncomplicated.  4) Decision-Making    Low complexity using standardized patient assessment instrument and/or measureable assessment of functional outcome.  Cumulative Therapy Evaluation is: Low complexity.    Previous and current functional limitations:  (See Goal Flow Sheet for this information)    Short term and Long term goals: (See Goal Flow Sheet for this information)     Communication ability:  Patient appears to be able to clearly communicate and understand verbal and written communication and follow directions correctly.  Treatment Explanation - The following has been discussed with the patient:   RX ordered/plan of care  Anticipated outcomes  Possible risks and side effects  This patient would benefit from PT intervention to resume normal activities.   Rehab potential is good.    Frequency:  1 X week, once daily  Duration:  for 6 weeks  Discharge Plan:  Achieve all LTG.  Independent in home treatment program.  Reach maximal therapeutic benefit.    Please refer to the daily flowsheet for treatment today, total treatment time and time spent performing 1:1 timed codes.

## 2018-03-12 ENCOUNTER — THERAPY VISIT (OUTPATIENT)
Dept: PHYSICAL THERAPY | Facility: CLINIC | Age: 63
End: 2018-03-12
Payer: COMMERCIAL

## 2018-03-12 DIAGNOSIS — M25.562 ACUTE PAIN OF LEFT KNEE: ICD-10-CM

## 2018-03-12 PROCEDURE — 97110 THERAPEUTIC EXERCISES: CPT | Mod: GP | Performed by: PHYSICAL THERAPIST

## 2018-03-12 PROCEDURE — 97140 MANUAL THERAPY 1/> REGIONS: CPT | Mod: GP | Performed by: PHYSICAL THERAPIST

## 2018-03-12 PROCEDURE — 97112 NEUROMUSCULAR REEDUCATION: CPT | Mod: GP | Performed by: PHYSICAL THERAPIST

## 2018-03-19 ENCOUNTER — THERAPY VISIT (OUTPATIENT)
Dept: PHYSICAL THERAPY | Facility: CLINIC | Age: 63
End: 2018-03-19
Payer: COMMERCIAL

## 2018-03-19 DIAGNOSIS — M25.562 ACUTE PAIN OF LEFT KNEE: ICD-10-CM

## 2018-03-19 PROCEDURE — 97140 MANUAL THERAPY 1/> REGIONS: CPT | Mod: GP | Performed by: PHYSICAL THERAPIST

## 2018-03-19 PROCEDURE — 97112 NEUROMUSCULAR REEDUCATION: CPT | Mod: GP | Performed by: PHYSICAL THERAPIST

## 2018-03-19 PROCEDURE — 97110 THERAPEUTIC EXERCISES: CPT | Mod: GP | Performed by: PHYSICAL THERAPIST

## 2018-04-11 ENCOUNTER — OFFICE VISIT (OUTPATIENT)
Dept: ORTHOPEDICS | Facility: CLINIC | Age: 63
End: 2018-04-11
Payer: COMMERCIAL

## 2018-04-11 VITALS — HEIGHT: 65 IN | BODY MASS INDEX: 25.16 KG/M2 | RESPIRATION RATE: 16 BRPM | WEIGHT: 151 LBS

## 2018-04-11 DIAGNOSIS — M25.562 CHRONIC PAIN OF LEFT KNEE: Primary | ICD-10-CM

## 2018-04-11 DIAGNOSIS — G89.29 CHRONIC PAIN OF LEFT KNEE: Primary | ICD-10-CM

## 2018-04-11 NOTE — PROGRESS NOTES
" Subjective:   Micah Kelly is a 62 year old male who is f/u for left knee. The pt reports that he is 100% better.  Tapering off the exercises because he feels so good.    Date of injury: N/A  Date last seen: 2/28/2018  Following Therapeutic Plan: Yes   Pain: Improving  Function: Improving  Interval History: CSI and PT     PAST MEDICAL, SOCIAL, SURGICAL AND FAMILY HISTORY: He  has a past medical history of CARDIOVASCULAR SCREENING; LDL GOAL LESS THAN 160 (2/2/2016) and Hypertension.  He  has a past surgical history that includes vasectomy (1986).  His family history includes Colon Cancer (age of onset: 88) in his mother; Hypertension in his father; Peptic Ulcer Disease in his mother.  He reports that he has never smoked. He has never used smokeless tobacco. He reports that he drinks about 0.6 oz of alcohol per week  He reports that he does not use illicit drugs.    ALLERGIES: He has No Known Allergies.    CURRENT MEDICATIONS: He has a current medication list which includes the following prescription(s): tramadol, lisinopril, amlodipine, aspirin, cholecalciferol, ascorbic acid, and omega-3 fatty acids.     REVIEW OF SYSTEMS: 10 point review of systems is negative except as noted above.     Exam:   Resp 16  Ht 5' 5\" (1.651 m)  Wt 151 lb (68.5 kg)  BMI 25.13 kg/m2           CONSTITUTIONAL: healthy, alert, no distress and cooperative  HEAD: Normocephalic. No masses, lesions, tenderness or abnormalities  SKIN: no suspicious lesions or rashes  GAIT: normal  NEUROLOGIC: Non-focal  PSYCHIATRIC: affect normal/bright and mentation appears normal.    MUSCULOSKELETAL: left knee pain      Inspection:       AP/lateral alignment normal  Tender: denies      Non-tender: patellar facets, MCL, LCL, lateral joint line, medial joint line, IT band, posterior knee   Active Range of Motion: all normal  Strength: quad  5/5, Hamstrings  5/5, Gastroc  5/5, Tibialis anterior  5/5, Peroneals  5/5 and core strength  5/5 hip abductors and " other core muscles   Special tests: normal Valgus stress test, normal Varus, negative Lachman's test, negative Stanton's, no apprehension with lateral stress of the patella.         Assessment/Plan:   Pt Is a 61 yo white male with PMHx of HTN presenting with f/u for left knee pain  1. Left knee pain- resolved with exercises and CSI  Continue strengthening  RTC prn, when injection wears off    X-RAY INTERPRETATION:   none

## 2018-04-11 NOTE — LETTER
"  4/11/2018      RE: Micah Kelly  [address]  [facility] [address]        Subjective:   Micah Kelly is a 62 year old male who is f/u for left knee. The pt reports that he is 100% better.  Tapering off the exercises because he feels so good.    Date of injury: N/A  Date last seen: 2/28/2018  Following Therapeutic Plan: Yes   Pain: Improving  Function: Improving  Interval History: CSI and PT     PAST MEDICAL, SOCIAL, SURGICAL AND FAMILY HISTORY: He  has a past medical history of CARDIOVASCULAR SCREENING; LDL GOAL LESS THAN 160 (2/2/2016) and Hypertension.  He  has a past surgical history that includes vasectomy (1986).  His family history includes Colon Cancer (age of onset: 88) in his mother; Hypertension in his father; Peptic Ulcer Disease in his mother.  He reports that he has never smoked. He has never used smokeless tobacco. He reports that he drinks about 0.6 oz of alcohol per week  He reports that he does not use illicit drugs.    ALLERGIES: He has No Known Allergies.    CURRENT MEDICATIONS: He has a current medication list which includes the following prescription(s): tramadol, lisinopril, amlodipine, aspirin, cholecalciferol, ascorbic acid, and omega-3 fatty acids.     REVIEW OF SYSTEMS: 10 point review of systems is negative except as noted above.     Exam:   Resp 16  Ht 5' 5\" (1.651 m)  Wt 151 lb (68.5 kg)  BMI 25.13 kg/m2           CONSTITUTIONAL: healthy, alert, no distress and cooperative  HEAD: Normocephalic. No masses, lesions, tenderness or abnormalities  SKIN: no suspicious lesions or rashes  GAIT: normal  NEUROLOGIC: Non-focal  PSYCHIATRIC: affect normal/bright and mentation appears normal.    MUSCULOSKELETAL: left knee pain      Inspection:       AP/lateral alignment normal  Tender: denies      Non-tender: patellar facets, MCL, LCL, lateral joint line, medial joint line, IT band, posterior knee   Active Range of Motion: all normal  Strength: quad  5/5, Hamstrings  5/5, Gastroc  " 5/5, Tibialis anterior  5/5, Peroneals  5/5 and core strength  5/5 hip abductors and other core muscles   Special tests: normal Valgus stress test, normal Varus, negative Lachman's test, negative Stanton's, no apprehension with lateral stress of the patella.         Assessment/Plan:   Pt Is a 63 yo white male with PMHx of HTN presenting with f/u for left knee pain  1. Left knee pain- resolved with exercises and CSI  Continue strengthening  RTC prn, when injection wears off    X-RAY INTERPRETATION:   none    Jacque Aguilera MD

## 2018-04-11 NOTE — MR AVS SNAPSHOT
"              After Visit Summary   2018    Micah Kelly    MRN: [id number]           Patient Information     Date Of Birth          1955        Visit Information        Provider Department      2018 2:00 PM Jacque Aguilera MD [facility]        Today's Diagnoses     Chronic pain of left knee    -  1       Follow-ups after your visit        Follow-up notes from your care team     Return if symptoms worsen or fail to improve.      Who to contact     Please call your clinic at [phone] to:    Ask questions about your health    Make or cancel appointments    Discuss your medicines    Learn about your test results    Speak to your doctor            Additional Information About Your Visit        MyChart Information     [facility] [facility] is an electronic gateway that provides easy, online access to your medical records. With [employer], you can request a clinic appointment, read your test results, renew a prescription or communicate with your care team.     To sign up for [facility] [facility] visit the website at [email or web address]   You will be asked to enter the access code listed below, as well as some personal information. Please follow the directions to create your username and password.     Your access code is: [email or web address]  Expires: 2018  5:49 PM     Your access code will  in 90 days. If you need help or a new code, please contact your [facility] or call [phone] for assistance.        Care EveryWhere ID     This is your Care EveryWhere ID. This could be used by other organizations to access your [facility] medical records  [id number]        Your Vitals Were     Respirations Height BMI (Body Mass Index)             16 5' 5\" (1.651 m) 25.13 kg/m2          Blood Pressure from Last 3 Encounters:   18 129/86   18 122/87   18 116/80    Weight from Last 3 Encounters:   18 151 lb (68.5 kg)   18 151 lb (68.5 kg) "   02/27/18 151 lb 12 oz (68.8 kg)              Today, you had the following     No orders found for display       Primary Care Provider Office Phone # Fax #    Gali Kennedy [phone] [address]  [facility] [address]        Equal Access to Services     MARCOS SHI : Hadii aad ku hadasho Soomaali, waaxda luqadaha, qaybta kaalmada adeegyada, waxay idiin hayaan adeeg armando la'miriann . So [facility] [phone].    ATENCIÓN: Si habla español, tiene a garcia disposición servicios gratuitos de asistencia lingüística. Ll[facility] [phone].    We comply with applicable federal civil rights laws and [place] laws. We do not discriminate on the basis of race, color, national origin, age, disability, sex, sexual orientation, or gender identity.            Thank you!     Thank you for choosing [facility]  for your care. Our goal is always to provide you with excellent care. Hearing back from our patients is one way we can continue to improve our services. Please take a few minutes to complete the written survey that you may receive in the mail after your visit with us. Thank you!             Your Updated Medication List - Protect others around you: Learn how to safely use, store and throw away your medicines at www.disposemymeds.org.          This list is accurate as of 4/11/18  3:53 PM.  Always use your most recent med list.                   Brand Name Dispense Instructions for use Diagnosis    amLODIPine 2.5 MG tablet    NORVASC    90 tablet    TAKE 1 TABLET (2.5 MG) BY MOUTH DAILY    Hypertension goal BP (blood pressure) < 150/90       ASPIRIN PO      Take 81 mg by mouth daily        FISH OIL PO           lisinopril 20 MG tablet    PRINIVIL/ZESTRIL    90 tablet    TAKE 1 TABLET (20 MG) BY MOUTH DAILY. NEEDS TO BE SEEN.    Hypertension goal BP (blood pressure) < 150/90       traMADol 50 MG tablet    ULTRAM    10 tablet    Take 1-2 tablets ( mg) by mouth every 8 hours as  needed for moderate pain    Acute pain of left knee       VITAMIN C PO      Take 1,000 mg by mouth daily        VITAMIN D (CHOLECALCIFEROL) PO      Take 400 Units by mouth daily

## 2018-06-19 DIAGNOSIS — I10 HYPERTENSION GOAL BP (BLOOD PRESSURE) < 150/90: ICD-10-CM

## 2018-06-19 PROCEDURE — 80048 BASIC METABOLIC PNL TOTAL CA: CPT | Performed by: FAMILY MEDICINE

## 2018-06-19 PROCEDURE — 36415 COLL VENOUS BLD VENIPUNCTURE: CPT | Performed by: FAMILY MEDICINE

## 2018-06-19 NOTE — LETTER
June 21, 2018      Micah TRACEY Leticia  [address]  [facility] [address]        Dear ,    We are writing to inform you of your test results.    Hello!  It was a pleasure to see you in clinic!  Thank you for getting labs done.     Your glucose level, electrolyte level and kidney function, measured with a test called the basic metabolic panel, is normal, which is great news!      If you have any questions, please contact the clinic or schedule an appointment with me, thank you!    Resulted Orders   **Basic metabolic panel FUTURE anytime   Result Value Ref Range    Sodium 139 133 - 144 mmol/L    Potassium 4.6 3.4 - 5.3 mmol/L    Chloride 104 94 - 109 mmol/L    Carbon Dioxide 29 20 - 32 mmol/L    Anion Gap 6 3 - 14 mmol/L    Glucose 76 70 - 99 mg/dL      Comment:      Non Fasting    Urea Nitrogen 21 7 - 30 mg/dL    Creatinine 0.78 0.66 - 1.25 mg/dL    GFR Estimate >90 >60 mL/min/1.7m2      Comment:      Non  GFR Calc    GFR Estimate If Black >90 >60 mL/min/1.7m2      Comment:       GFR Calc    Calcium 9.1 8.5 - 10.1 mg/dL     If you have any questions or concerns, please call the clinic at the number listed above.   Sincerely,  Gali Kennedy MD/nr

## 2018-06-20 LAB
ANION GAP SERPL CALCULATED.3IONS-SCNC: 6 MMOL/L (ref 3–14)
BUN SERPL-MCNC: 21 MG/DL (ref 7–30)
CALCIUM SERPL-MCNC: 9.1 MG/DL (ref 8.5–10.1)
CHLORIDE SERPL-SCNC: 104 MMOL/L (ref 94–109)
CO2 SERPL-SCNC: 29 MMOL/L (ref 20–32)
CREAT SERPL-MCNC: 0.78 MG/DL (ref 0.66–1.25)
GFR SERPL CREATININE-BSD FRML MDRD: >90 ML/MIN/1.7M2
GLUCOSE SERPL-MCNC: 76 MG/DL (ref 70–99)
POTASSIUM SERPL-SCNC: 4.6 MMOL/L (ref 3.4–5.3)
SODIUM SERPL-SCNC: 139 MMOL/L (ref 133–144)

## 2018-06-20 NOTE — PROGRESS NOTES
--->  Hello!  Please send letter with result note below and copy of results, thank you! Please let me know if you have any questions!  MARV FREEMAN MD 6/20/2018     Hello!  It was a pleasure to see you in clinic!  Thank you for getting labs done.     Your glucose level, electrolyte level and kidney function, measured with a test called the basic metabolic panel, is normal, which is great news!      If you have any questions, please contact the clinic or schedule an appointment with me, thank you!

## 2018-08-03 ENCOUNTER — OFFICE VISIT (OUTPATIENT)
Dept: FAMILY MEDICINE | Facility: CLINIC | Age: 63
End: 2018-08-03
Payer: COMMERCIAL

## 2018-08-03 VITALS
OXYGEN SATURATION: 93 % | HEART RATE: 80 BPM | DIASTOLIC BLOOD PRESSURE: 83 MMHG | HEIGHT: 65 IN | TEMPERATURE: 98.5 F | BODY MASS INDEX: 25.33 KG/M2 | WEIGHT: 152 LBS | SYSTOLIC BLOOD PRESSURE: 121 MMHG | RESPIRATION RATE: 18 BRPM

## 2018-08-03 DIAGNOSIS — I10 HYPERTENSION GOAL BP (BLOOD PRESSURE) < 150/90: ICD-10-CM

## 2018-08-03 DIAGNOSIS — Z13.6 CARDIOVASCULAR SCREENING; LDL GOAL LESS THAN 160: ICD-10-CM

## 2018-08-03 DIAGNOSIS — Z11.4 SCREENING FOR HUMAN IMMUNODEFICIENCY VIRUS: ICD-10-CM

## 2018-08-03 DIAGNOSIS — Z00.00 ROUTINE GENERAL MEDICAL EXAMINATION AT A HEALTH CARE FACILITY: Primary | ICD-10-CM

## 2018-08-03 PROCEDURE — 99396 PREV VISIT EST AGE 40-64: CPT | Performed by: FAMILY MEDICINE

## 2018-08-03 PROCEDURE — 87389 HIV-1 AG W/HIV-1&-2 AB AG IA: CPT | Performed by: FAMILY MEDICINE

## 2018-08-03 PROCEDURE — G0103 PSA SCREENING: HCPCS | Performed by: FAMILY MEDICINE

## 2018-08-03 PROCEDURE — 36415 COLL VENOUS BLD VENIPUNCTURE: CPT | Performed by: FAMILY MEDICINE

## 2018-08-03 PROCEDURE — 82043 UR ALBUMIN QUANTITATIVE: CPT | Performed by: FAMILY MEDICINE

## 2018-08-03 PROCEDURE — 80061 LIPID PANEL: CPT | Performed by: FAMILY MEDICINE

## 2018-08-03 NOTE — MR AVS SNAPSHOT
After Visit Summary   8/3/2018    Micah Kelly    MRN: [id number]           Patient Information     Date Of Birth          1955        Visit Information        Provider Department      8/3/2018 11:00 AM Gali [facility] MD [facility]        Today's Diagnoses     Routine general medical examination at a health care facility    -  1    Hypertension goal BP (blood pressure) < 150/90        CARDIOVASCULAR SCREENING; LDL GOAL LESS THAN 160        Screening for human immunodeficiency virus          Care Instructions    Contrast Ice/Heat  1. Apply ice/cold pack for 30 seconds.  2. Switch to heat pack (up to 104 degrees/40 degrees celcius, the same as a hot hot tub) for 30 sec.  Continue to switch back and forth every 30 seconds for 5 minutes, finishing with ice.  [facility]  Physical therapy to get you back in the game of life   [facility], a service of [place] [facility], provides orthopedic and sports physical therapy at over 30 [facility] locations. In addtion to physical therapy, [facility] offers chiropractic and athletic training services.   Lab Results   Component Value Date    CHOL 180 03/06/2017    CHOL 198 02/02/2016     Lab Results   Component Value Date    HDL 41 03/06/2017    HDL 53 02/02/2016     Lab Results   Component Value Date     03/06/2017     02/02/2016     Lab Results   Component Value Date    TRIG 164 03/06/2017    TRIG 66 02/02/2016     No results found for: LATANYA  Appointments [phone] -         Preventive Health Recommendations  Male Ages 50 - 64    Yearly exam:             See your health care provider every year in order to  o   Review health changes.   o   Discuss preventive care.    o   Review your medicines if your doctor has prescribed any.     Have a cholesterol test every 5 years, or more frequently if you are at risk for high cholesterol/heart disease.     Have a diabetes  test (fasting glucose) every three years. If you are at risk for diabetes, you should have this test more often.     Have a colonoscopy at age 50, or have a yearly FIT test (stool test). These exams will check for colon cancer.      Talk with your health care provider about whether or not a prostate cancer screening test (PSA) is right for you.    You should be tested each year for STDs (sexually transmitted diseases), if you re at risk.     Shots: Get a flu shot each year. Get a tetanus shot every 10 years.     Nutrition:    Eat at least 5 servings of fruits and vegetables daily.     Eat whole-grain bread, whole-wheat pasta and brown rice instead of white grains and rice.     Get adequate Calcium and Vitamin D.     Lifestyle    Exercise for at least 150 minutes a week (30 minutes a day, 5 days a week). This will help you control your weight and prevent disease.     Limit alcohol to one drink per day.     No smoking.     Wear sunscreen to prevent skin cancer.     See your dentist every six months for an exam and cleaning.     See your eye doctor every 1 to 2 years.            Follow-ups after your visit        Your next 10 appointments already scheduled     Sep 21, 2018   Procedure with Rere Calvo MD   [facility] ([facility])    [address]  [place] [address] [phone]           [facility] is located at [address]              Who to contact     If you have questions or need follow up information about today's clinic visit or your schedule please contact [facility] directly at [phone].  Normal or non-critical lab and imaging results will be communicated to you by MyChart, letter or phone within 4 business days after the clinic has received the results. If you do not hear from us within 7 days, please contact the clinic through MyChart or phone. If you have a critical or abnormal lab result, we will notify you  "by phone as soon as possible.  Submit refill requests through [email or web address] or call your pharmacy and they will forward the refill request to us. Please allow 3 business days for your refill to be completed.          Additional Information About Your Visit        Care EveryWhere ID     This is your Care EveryWhere ID. This could be used by other organizations to access your [facility] medical records  [id number]        Your Vitals Were     Pulse Temperature Respirations Height Pulse Oximetry BMI (Body Mass Index)    80 98.5  F (36.9  C) (Oral) 18 5' 5\" (1.651 m) 93% 25.29 kg/m2       Blood Pressure from Last 3 Encounters:   08/03/18 121/83   02/27/18 129/86   02/05/18 122/87    Weight from Last 3 Encounters:   08/03/18 152 lb (68.9 kg)   04/11/18 151 lb (68.5 kg)   02/28/18 151 lb (68.5 kg)              We Performed the Following     Albumin Random Urine Quantitative with Creat Ratio     HIV Antigen Antibody Combo     Lipid panel reflex to direct LDL Non-fasting     PSA, screen        Primary Care Provider Office Phone # Fax #    Gali Renae Kennedy [phone]       [address]  [facility] [address]        Equal Access to Services     MARCOS SHI AH: Hadii collin ku hadasho Soomaali, waaxda luqadaha, qaybta kaalmada adeegyada, sandi wangin micki jefferson. So [facility] [phone].    ATENCIÓN: Si habla español, tiene a garcia disposición servicios gratuitos de asistencia lingüística. Llame al [phone].    We comply with applicable federal civil rights laws and [place] laws. We do not discriminate on the basis of race, color, national origin, age, disability, sex, sexual orientation, or gender identity.            Thank you!     Thank you for choosing [facility]  for your care. Our goal is always to provide you with excellent care. Hearing back from our patients is one way we can continue to improve our services. Please take a few minutes to complete the written survey that " you may receive in the mail after your visit with us. Thank you!             Your Updated Medication List - Protect others around you: Learn how to safely use, store and throw away your medicines at www.disposemymeds.org.          This list is accurate as of 8/3/18 12:00 PM.  Always use your most recent med list.                   Brand Name Dispense Instructions for use Diagnosis    amLODIPine 2.5 MG tablet    NORVASC    90 tablet    TAKE 1 TABLET (2.5 MG) BY MOUTH DAILY    Hypertension goal BP (blood pressure) < 150/90       ASPIRIN PO      Take 81 mg by mouth daily        FISH OIL PO           lisinopril 20 MG tablet    PRINIVIL/ZESTRIL    90 tablet    Take 1 tablet (20 mg) by mouth daily    Hypertension goal BP (blood pressure) < 150/90       traMADol 50 MG tablet    ULTRAM    10 tablet    Take 1-2 tablets ( mg) by mouth every 8 hours as needed for moderate pain    Acute pain of left knee       VITAMIN C PO      Take 1,000 mg by mouth daily        VITAMIN D (CHOLECALCIFEROL) PO      Take 400 Units by mouth daily

## 2018-08-03 NOTE — PATIENT INSTRUCTIONS
Contrast Ice/Heat  1. Apply ice/cold pack for 30 seconds.  2. Switch to heat pack (up to 104 degrees/40 degrees celcius, the same as a hot hot tub) for 30 sec.  Continue to switch back and forth every 30 seconds for 5 minutes, finishing with ice.  [facility]  Physical therapy to get you back in the game of life   [facility], a service of [facility], provides orthopedic and sports physical therapy at over 30 [facility] locations. In addtion to physical therapy, [facility] offers chiropractic and athletic training services.   Lab Results   Component Value Date    CHOL 180 03/06/2017    CHOL 198 02/02/2016     Lab Results   Component Value Date    HDL 41 03/06/2017    HDL 53 02/02/2016     Lab Results   Component Value Date     03/06/2017     02/02/2016     Lab Results   Component Value Date    TRIG 164 03/06/2017    TRIG 66 02/02/2016     No results found for: [facility]  Appointments [phone] -         Preventive Health Recommendations  Male Ages 50   64    Yearly exam:             See your health care provider every year in order to  o   Review health changes.   o   Discuss preventive care.    o   Review your medicines if your doctor has prescribed any.     Have a cholesterol test every 5 years, or more frequently if you are at risk for high cholesterol/heart disease.     Have a diabetes test (fasting glucose) every three years. If you are at risk for diabetes, you should have this test more often.     Have a colonoscopy at age 50, or have a yearly FIT test (stool test). These exams will check for colon cancer.      Talk with your health care provider about whether or not a prostate cancer screening test (PSA) is right for you.    You should be tested each year for STDs (sexually transmitted diseases), if you re at risk.     Shots: Get a flu shot each year. Get a tetanus shot every 10 years.     Nutrition:    Eat at least 5 servings of  fruits and vegetables daily.     Eat whole-grain bread, whole-wheat pasta and brown rice instead of white grains and rice.     Get adequate Calcium and Vitamin D.     Lifestyle    Exercise for at least 150 minutes a week (30 minutes a day, 5 days a week). This will help you control your weight and prevent disease.     Limit alcohol to one drink per day.     No smoking.     Wear sunscreen to prevent skin cancer.     See your dentist every six months for an exam and cleaning.     See your eye doctor every 1 to 2 years.

## 2018-08-03 NOTE — PROGRESS NOTES
SUBJECTIVE:   CC: Micah Kelly is an 63 year old male who presents for preventative health visit.     Physical   Annual:     Getting at least 3 servings of Calcium per day:  Yes    Bi-annual eye exam:  Yes    Dental care twice a year:  NO    Sleep apnea or symptoms of sleep apnea:  None    Diet:  Vegetarian/vegan    Frequency of exercise:  2-3 days/week    Duration of exercise:  30-45 minutes    Taking medications regularly:  Yes    Medication side effects:  None    Additional concerns today:  YES    Left shoulder pain  Onset about one month ago, getting worse, described as throbbing in left shoulder with pain in posterior bicep, gets stiff overnight. Ice doesn't help at all, some over the counter meds help.        Last Comprehensive Metabolic Panel:  Sodium   Date Value Ref Range Status   06/19/2018 139 133 - 144 mmol/L Final     Potassium   Date Value Ref Range Status   06/19/2018 4.6 3.4 - 5.3 mmol/L Final     Chloride   Date Value Ref Range Status   06/19/2018 104 94 - 109 mmol/L Final     Carbon Dioxide   Date Value Ref Range Status   06/19/2018 29 20 - 32 mmol/L Final     Anion Gap   Date Value Ref Range Status   06/19/2018 6 3 - 14 mmol/L Final     Glucose   Date Value Ref Range Status   06/19/2018 76 70 - 99 mg/dL Final     Comment:     Non Fasting     Urea Nitrogen   Date Value Ref Range Status   06/19/2018 21 7 - 30 mg/dL Final     Creatinine   Date Value Ref Range Status   06/19/2018 0.78 0.66 - 1.25 mg/dL Final     GFR Estimate   Date Value Ref Range Status   06/19/2018 >90 >60 mL/min/1.7m2 Final     Comment:     Non  GFR Calc     Calcium   Date Value Ref Range Status   06/19/2018 9.1 8.5 - 10.1 mg/dL Final     Today's PHQ-2 Score:   PHQ-2 ( 1999 [employer]) 8/3/2018   Q1: Little interest or pleasure in doing things 0   Q2: Feeling down, depressed or hopeless 0   PHQ-2 Score 0   Q1: Little interest or pleasure in doing things Not at all   Q2: Feeling down, depressed or hopeless Not at  all   PHQ-2 Score 0       Abuse: Current or Past(Physical, Sexual or Emotional)- No  Do you feel safe in your environment - Yes    Social History   Substance Use Topics     Smoking status: Never Smoker     Smokeless tobacco: Never Used     Alcohol use 0.6 oz/week     1 Standard drinks or equivalent per week      Comment: ocassional drink     Alcohol Use 8/3/2018   If you drink alcohol do you typically have greater than 3 drinks per day OR greater than 7 drinks per week? No     Last PSA:   PSA   Date Value Ref Range Status   08/11/2017 1.98 0 - 4 ug/L Final     Comment:     Assay Method:  Chemiluminescence using Siemens Vista analyzer       Reviewed orders with patient. Reviewed health maintenance and updated orders accordingly - Yes  BP Readings from Last 3 Encounters:   08/03/18 121/83   02/27/18 129/86   02/05/18 122/87    Wt Readings from Last 3 Encounters:   08/03/18 152 lb (68.9 kg)   04/11/18 151 lb (68.5 kg)   02/28/18 151 lb (68.5 kg)                  Current Outpatient Prescriptions   Medication Sig Dispense Refill     amLODIPine (NORVASC) 2.5 MG tablet TAKE 1 TABLET (2.5 MG) BY MOUTH DAILY 90 tablet 3     Ascorbic Acid (VITAMIN C PO) Take 1,000 mg by mouth daily       ASPIRIN PO Take 81 mg by mouth daily       lisinopril (PRINIVIL/ZESTRIL) 20 MG tablet Take 1 tablet (20 mg) by mouth daily 90 tablet 0     Omega-3 Fatty Acids (FISH OIL PO)        traMADol (ULTRAM) 50 MG tablet Take 1-2 tablets ( mg) by mouth every 8 hours as needed for moderate pain 10 tablet 0     VITAMIN D, CHOLECALCIFEROL, PO Take 400 Units by mouth daily       No Known Allergies  Recent Labs   Lab Test  06/19/18   1248  03/06/17   1435  02/02/16   1220   LDL   --   106*  132*   HDL   --   41  53   TRIG   --   164*  66   ALT   --    --   38   CR  0.78  0.83  0.82   GFRESTIMATED  >90  >90  Non African American GFR Calc    >90  Non  GFR Calc     GFRESTBLACK  >90  >90  African American GFR Calc    >90    GFR Calc     POTASSIUM  4.6  4.5  4.2        Reviewed and updated as needed this visit by clinical staff         Reviewed and updated as needed this visit by Provider        Past Medical History:   Diagnosis Date     CARDIOVASCULAR SCREENING; LDL GOAL LESS THAN 160 2/2/2016     Hypertension       Past Surgical History:   Procedure Laterality Date     VASECTOMY  1986    uncomplicated          Family History   Problem Relation Age of Onset     Colon Cancer Mother 88     Peptic Ulcer Disease Mother      Hypertension Father 98     d age 98 of stroke     Alzheimer Disease Father      Hyperlipidemia Father      Hypothyroidism Father      KIDNEY DISEASE Father       Review of Systems  CONSTITUTIONAL: NEGATIVE for fever, chills, change in weight  INTEGUMENTARY/SKIN: NEGATIVE for worrisome rashes, moles or lesions  EYES: NEGATIVE for vision changes or irritation  ENT: NEGATIVE for ear, mouth and throat problems  RESP: NEGATIVE for significant cough or SOB  CV: NEGATIVE for chest pain, palpitations or peripheral edema  GI: NEGATIVE for nausea, abdominal pain, heartburn, or change in bowel habits   male: negative for dysuria, hematuria, decreased urinary stream, erectile dysfunction, urethral discharge  MUSCULOSKELETAL: NEGATIVE for significant arthralgias or myalgia  NEURO: NEGATIVE for weakness, dizziness or paresthesias  PSYCHIATRIC: NEGATIVE for changes in mood or affect    OBJECTIVE:   There were no vitals taken for this visit.    Physical Exam  GENERAL: healthy, alert and no distress  EYES: Eyes grossly normal to inspection, PERRL and conjunctivae and sclerae normal  HENT: ear canals and TM's normal, nose and mouth without ulcers or lesions  NECK: no adenopathy, no asymmetry, masses, or scars and thyroid normal to palpation  RESP: lungs clear to auscultation - no rales, rhonchi or wheezes  CV: regular rate and rhythm, normal S1 S2, no S3 or S4, no murmur, click or rub, no peripheral edema and peripheral pulses  "strong  ABDOMEN: soft, nontender, no hepatosplenomegaly, no masses and bowel sounds normal  MS: no gross musculoskeletal defects noted, no edema  SKIN: no suspicious lesions or rashes  NEURO: Normal strength and tone, mentation intact and speech normal  PSYCH: mentation appears normal, affect normal/bright    ASSESSMENT/PLAN:   1. Routine general medical examination at a health care facility  routine  - Lipid panel reflex to direct LDL Non-fasting  - PSA, screen    2. Hypertension goal BP (blood pressure) < 150/90  BP Readings from Last 3 Encounters:   08/03/18 121/83   02/27/18 129/86   02/05/18 122/87    at goal  - Albumin Random Urine Quantitative with Creat Ratio    3. CARDIOVASCULAR SCREENING; LDL GOAL LESS THAN 160  LDL Cholesterol Calculated   Date Value Ref Range Status   03/06/2017 106 (H) <100 mg/dL Final     Comment:     Above desirable:  100-129 mg/dl   Borderline High:  130-159 mg/dL   High:             160-189 mg/dL   Very high:       >189 mg/dl          4. Screening for human immunodeficiency virus  Routine screening  - HIV Antigen Antibody Combo    COUNSELING:   Reviewed preventive health counseling, as reflected in patient instructions    BP Readings from Last 1 Encounters:   02/27/18 129/86     Estimated body mass index is 25.13 kg/(m^2) as calculated from the following:    Height as of 4/11/18: 5' 5\" (1.651 m).    Weight as of 4/11/18: 151 lb (68.5 kg).         reports that he has never smoked. He has never used smokeless tobacco.      Counseling Resources:  ATP IV Guidelines  Pooled Cohorts Equation Calculator  FRAX Risk Assessment  ICSI Preventive Guidelines  Dietary Guidelines for Americans, 2010  USDA's MyPlate  ASA Prophylaxis  Lung CA Screening    Gali Kennedy MD  [facility]  Answers for HPI/ROS submitted by the patient on 8/3/2018   PHQ-2 Score: 0    "

## 2018-08-03 NOTE — LETTER
August 7, 2018      Micah Kelly  [address]  [place] [address]        Dear PatLeticia,    We are writing to inform you of your test results.    Hello!  It was a pleasure to see you in clinic!  Thank you for getting labs done.     Your microalbumen is normal, which is great news. This means you do not have protein in the urine, and that your kidneys are currently functioning well. Keeping blood pressure and blood fats low is the best way to preserve your kidney function over your lifetime.     Your hiv test was negative for infection, you do NOT have this disease.     Your PSA (prostate cancer blood test)  is normal.     Your cholesterol is at goal.    Keep up the good work!  Everything points to a long, healthy life for you!    Resulted Orders   Lipid panel reflex to direct LDL Non-fasting   Result Value Ref Range    Cholesterol 179 <200 mg/dL    Triglycerides 130 <150 mg/dL      Comment:      Non Fasting    HDL Cholesterol 42 >39 mg/dL    LDL Cholesterol Calculated 111 (H) <100 mg/dL      Comment:      Above desirable:  100-129 mg/dl  Borderline High:  130-159 mg/dL  High:             160-189 mg/dL  Very high:       >189 mg/dl      Non HDL Cholesterol 137 (H) <130 mg/dL      Comment:      Above Desirable:  130-159 mg/dl  Borderline high:  160-189 mg/dl  High:             190-219 mg/dl  Very high:       >219 mg/dl     PSA, screen   Result Value Ref Range    PSA 2.00 0 - 4 ug/L      Comment:      Assay Method:  Chemiluminescence using Siemens Vista analyzer   HIV Antigen Antibody Combo   Result Value Ref Range    HIV Antigen Antibody Combo Nonreactive NR^Nonreactive          Comment:      HIV-1 p24 Ag & HIV-1/HIV-2 Ab Not Detected   Albumin Random Urine Quantitative with Creat Ratio   Result Value Ref Range    Creatinine Urine 32 mg/dL    Albumin Urine mg/L <5 mg/L    Albumin Urine mg/g Cr Unable to calculate due to low value 0 - 17 mg/g Cr       If you have any questions or concerns, please call the  clinic at the number listed above.       Sincerely,        Gali Kennedy MD/nr

## 2018-08-04 LAB
CHOLEST SERPL-MCNC: 179 MG/DL
CREAT UR-MCNC: 32 MG/DL
HDLC SERPL-MCNC: 42 MG/DL
LDLC SERPL CALC-MCNC: 111 MG/DL
MICROALBUMIN UR-MCNC: <5 MG/L
MICROALBUMIN/CREAT UR: NORMAL MG/G CR (ref 0–17)
NONHDLC SERPL-MCNC: 137 MG/DL
PSA SERPL-ACNC: 2 UG/L (ref 0–4)
TRIGL SERPL-MCNC: 130 MG/DL

## 2018-08-06 LAB — HIV 1+2 AB+HIV1 P24 AG SERPL QL IA: NONREACTIVE

## 2018-08-07 NOTE — PROGRESS NOTES
Hello!  It was a pleasure to see you in clinic!  Thank you for getting labs done.     Your microalbumen is normal, which is great news. This means you do not have protein in the urine, and that your kidneys are currently functioning well. Keeping blood pressure and blood fats low is the best way to preserve your kidney function over your lifetime.     Your hiv test was negative for infection, you do NOT have this disease.     Your PSA (prostate cancer blood test)  is normal.     Your cholesterol is at goal.    Keep up the good work!  Everything points to a long, healthy life for you!    Sincerely,  Dr. Gali Kennedy MD  8/6/2018

## 2018-09-02 DIAGNOSIS — I10 HYPERTENSION GOAL BP (BLOOD PRESSURE) < 150/90: ICD-10-CM

## 2018-09-05 DIAGNOSIS — I10 HYPERTENSION GOAL BP (BLOOD PRESSURE) < 150/90: ICD-10-CM

## 2018-09-05 NOTE — TELEPHONE ENCOUNTER
"Requested Prescriptions   Pending Prescriptions Disp Refills     amLODIPine (NORVASC) 2.5 MG tablet [Pharmacy Med Name: AMLODIPINE BESYLATE 2.5 MG TAB]  Last Written Prescription Date:  8/15/2017  Last Fill Quantity: 90 tablet,  # refills: 3   Last Office Visit: 8/3/2018   Future Office Visit:      90 tablet 3     Sig: TAKE 1 TABLET BY MOUTH ONE TIME DAILY.    Calcium Channel Blockers Protocol  Passed    9/2/2018  5:51 PM       Passed - Blood pressure under 140/90 in past 12 months    BP Readings from Last 3 Encounters:   08/03/18 121/83   02/27/18 129/86   02/05/18 122/87          Passed - Recent (12 mo) or future (30 days) visit within the authorizing provider's specialty    Patient had office visit in the last 12 months or has a visit in the next 30 days with authorizing provider or within the authorizing provider's specialty.  See \"Patient Info\" tab in inbasket, or \"Choose Columns\" in Meds & Orders section of the refill encounter.           Passed - Patient is age 18 or older       Passed - Normal serum creatinine on file in past 12 months    Recent Labs   Lab Test  06/19/18   1248   CR  0.78               "

## 2018-09-06 RX ORDER — AMLODIPINE BESYLATE 2.5 MG/1
TABLET ORAL
Qty: 90 TABLET | Refills: 2 | Status: SHIPPED | OUTPATIENT
Start: 2018-09-06 | End: 2019-06-20

## 2018-09-06 NOTE — TELEPHONE ENCOUNTER
Prescription approved per [facility] Refill Protocol.  ROSALVA Ko, BSN, RN

## 2018-09-06 NOTE — TELEPHONE ENCOUNTER
"Requested Prescriptions   Pending Prescriptions Disp Refills     lisinopril (PRINIVIL/ZESTRIL) 20 MG tablet [Pharmacy Med Name: LISINOPRIL 20 MG TABLET]  Last Written Prescription Date:  6/13/2018  Last Fill Quantity: 90 tablet,  # refills: 0   Last Office Visit: 8/3/2018   Future Office Visit:      90 tablet 0     Sig: TAKE 1 TABLET BY MOUTH EVERY DAY    ACE Inhibitors (Including Combos) Protocol Passed    9/5/2018 12:50 PM       Passed - Blood pressure under 140/90 in past 12 months    BP Readings from Last 3 Encounters:   08/03/18 121/83   02/27/18 129/86   02/05/18 122/87          Passed - Recent (12 mo) or future (30 days) visit within the authorizing provider's specialty    Patient had office visit in the last 12 months or has a visit in the next 30 days with authorizing provider or within the authorizing provider's specialty.  See \"Patient Info\" tab in inbasket, or \"Choose Columns\" in Meds & Orders section of the refill encounter.           Passed - Patient is age 18 or older       Passed - Normal serum creatinine on file in past 12 months    Recent Labs   Lab Test  06/19/18   1248   CR  0.78          Passed - Normal serum potassium on file in past 12 months    Recent Labs   Lab Test  06/19/18   1248   POTASSIUM  4.6               "

## 2018-09-07 RX ORDER — LISINOPRIL 20 MG/1
TABLET ORAL
Qty: 90 TABLET | Refills: 2 | Status: SHIPPED | OUTPATIENT
Start: 2018-09-07 | End: 2019-06-20

## 2018-09-08 NOTE — TELEPHONE ENCOUNTER
Prescription approved per [facility] Refill Protocol.  ROSALVA Ko, BSN, RN

## 2018-11-19 ENCOUNTER — OFFICE VISIT (OUTPATIENT)
Dept: FAMILY MEDICINE | Facility: CLINIC | Age: 63
End: 2018-11-19
Payer: COMMERCIAL

## 2018-11-19 VITALS
DIASTOLIC BLOOD PRESSURE: 88 MMHG | WEIGHT: 150.5 LBS | OXYGEN SATURATION: 94 % | TEMPERATURE: 98.5 F | SYSTOLIC BLOOD PRESSURE: 131 MMHG | BODY MASS INDEX: 25.04 KG/M2 | HEART RATE: 92 BPM

## 2018-11-19 DIAGNOSIS — I10 HYPERTENSION GOAL BP (BLOOD PRESSURE) < 150/90: ICD-10-CM

## 2018-11-19 DIAGNOSIS — J20.9 ACUTE BRONCHITIS WITH SYMPTOMS > 10 DAYS: Primary | ICD-10-CM

## 2018-11-19 PROCEDURE — 99214 OFFICE O/P EST MOD 30 MIN: CPT | Performed by: FAMILY MEDICINE

## 2018-11-19 RX ORDER — AZITHROMYCIN 250 MG/1
TABLET, FILM COATED ORAL
Qty: 6 TABLET | Refills: 0 | Status: ON HOLD | OUTPATIENT
Start: 2018-11-19 | End: 2018-12-14

## 2018-11-19 RX ORDER — FLUTICASONE PROPIONATE 50 MCG
2 SPRAY, SUSPENSION (ML) NASAL DAILY
Qty: 16 G | Refills: 0 | Status: SHIPPED | OUTPATIENT
Start: 2018-11-19 | End: 2018-12-15

## 2018-11-19 NOTE — PROGRESS NOTES
SUBJECTIVE:   Micah Kelly is a 63 year old male who presents to clinic today for the following health issues:      Acute Illness   Acute illness concerns: cold  Onset: 1 month    Fever: no    Chills/Sweats: YES- chills    Headache (location?): no     Sinus Pressure:YES    Conjunctivitis:  no    Ear Pain: YES: bilateral, ears feel plugged    Rhinorrhea: YES    Congestion: YES    Sore Throat: YES, from coughing      Cough: YES-productive of yellow sputum    Wheeze: no     Decreased Appetite: YES    Nausea: no     Vomiting: no     Diarrhea:  no     Dysuria/Freq.: no     Fatigue/Achiness: YES- both    Sick/Strep Exposure: no      Therapies Tried and outcome: vitamins, rest, nyquil, OTC cough syrup   Endorses PND.     Hard time sleeping due to coughing.   No smoking.   No recent travel.   Over the last few months he had shooting pains in both biceps. His neighbor is a PT and he has been doing exercises. Hard from him to lift his arm. He notes improvement during the weekend when not at work.   He has been on lisinopril for 3-4 years.     Problem list and histories reviewed & adjusted, as indicated.  Additional history: as documented    Labs reviewed in EPIC    Reviewed and updated as needed this visit by clinical staff       Reviewed and updated as needed this visit by Provider         ROS:  Constitutional, HEENT, cardiovascular, pulmonary, gi and gu systems are negative, except as otherwise noted.    OBJECTIVE:     /88  Pulse 92  Temp 98.5  F (36.9  C) (Oral)  Wt 150 lb 8 oz (68.3 kg)  SpO2 94%  BMI 25.04 kg/m2  Body mass index is 25.04 kg/(m^2).  GENERAL: healthy, alert and no distress  EYES: Eyes grossly normal to inspection  HENT: ear canals and TM's normal, nose and mouth without ulcers or lesions  NECK: no adenopathy  RESP: lungs clear to auscultation - no rales, rhonchi or wheezes  CV: regular rate and rhythm, normal S1 S2    Diagnostic Test Results:  none     ASSESSMENT/PLAN:     1. Acute  bronchitis with symptoms > 10 days  - fluticasone (FLONASE) 50 MCG/ACT spray; Spray 2 sprays into both nostrils daily  Dispense: 16 g; Refill: 0  - azithromycin (ZITHROMAX) 250 MG tablet; Two tablets first day, then one tablet daily for four days.  Dispense: 6 tablet; Refill: 0  - if you are not feeling better in one week, then please call in to schedule a chest xray     2. Hypertension goal BP (blood pressure) < 150/90  - advised to avoid OTC decongestants       Zack Oviedo MD  [facility]

## 2018-11-19 NOTE — PATIENT INSTRUCTIONS
- fluticasone (FLONASE) 50 MCG/ACT spray; Spray 2 sprays into both nostrils daily    - azithromycin (ZITHROMAX) 250 MG tablet; Two tablets first day, then one tablet daily for four days.   - if you are not feeling better in one week, then please call in to schedule a chest xray

## 2018-11-19 NOTE — MR AVS SNAPSHOT
After Visit Summary   11/19/2018    Micah Kelly    MRN: [id number]           Patient Information     Date Of Birth          1955        Visit Information        Provider Department      11/19/2018 12:20 PM Zack Oviedo MD [facility]        Today's Diagnoses     Acute bronchitis with symptoms > 10 days    -  1      Care Instructions      - fluticasone (FLONASE) 50 MCG/ACT spray; Spray 2 sprays into both nostrils daily    - azithromycin (ZITHROMAX) 250 MG tablet; Two tablets first day, then one tablet daily for four days.   - if you are not feeling better in one week, then please call in to schedule a chest xray           Follow-ups after your visit        Your next 10 appointments already scheduled     Dec 14, 2018   Procedure with Rere Calvo MD   [facility] End[facility] ([facility])    [address]  [place] [address] [phone]           [facility] is located at [address] [place]              Who to contact     If you have questions or need follow up information about today's clinic visit or your schedule please contact [facility] directly at [phone].  Normal or non-critical lab and imaging results will be communicated to you by MyChart, letter or phone within 4 business days after the clinic has received the results. If you do not hear from us within 7 days, please contact the clinic through MyChart or phone. If you have a critical or abnormal lab result, we will notify you by phone as soon as possible.  Submit refill requests through [employer] or call your pharmacy and they will forward the refill request to us. Please allow 3 business days for your refill to be completed.          Additional Information About Your Visit        Care EveryWhere ID     This is your Care EveryWhere ID. This could be used by other organizations to access your [facility] medical records  [id number]         Your Vitals Were     Pulse Temperature Pulse Oximetry BMI (Body Mass Index)          92 98.5  F (36.9  C) (Oral) 94% 25.04 kg/m2         Blood Pressure from Last 3 Encounters:   11/19/18 131/88   08/03/18 121/83   02/27/18 129/86    Weight from Last 3 Encounters:   11/19/18 150 lb 8 oz (68.3 kg)   08/03/18 152 lb (68.9 kg)   04/11/18 151 lb (68.5 kg)              Today, you had the following     No orders found for display         Today's Medication Changes          These changes are accurate as of 11/19/18 12:44 PM.  If you have any questions, ask your nurse or doctor.               Start taking these medicines.        Dose/Directions    azithromycin 250 MG tablet   Commonly known as:  ZITHROMAX   Used for:  Acute bronchitis with symptoms > 10 days   Started by:  Zack Oviedo MD        Two tablets first day, then one tablet daily for four days.   Quantity:  6 tablet   Refills:  0       fluticasone 50 MCG/ACT spray   Commonly known as:  FLONASE   Used for:  Acute bronchitis with symptoms > 10 days   Started by:  Zack Oviedo MD        Dose:  2 spray   Spray 2 sprays into both nostrils daily   Quantity:  16 g   Refills:  0            Where to get your medicines      These medications were sent to [place] [address] [place] [address] [address]     Phone:  [phone]     azithromycin 250 MG tablet    fluticasone 50 MCG/ACT spray                Primary Care Provider Office Phone # Fax #    Gali Kennedy [phone] [address]  [facility] [address]        Equal Access to Services     [facility] AH: Hadjose cruz Yuen, grey rosario, qasandi carpenter. So [facility] [phone].    ATENCIÓN: Si habla español, tiene a garcia disposición servicios gratuitos de asistencia lingüística. Llame al [phone].    We comply with applicable federal civil rights laws  and [place] laws. We do not discriminate on the basis of race, color, national origin, age, disability, sex, sexual orientation, or gender identity.            Thank you!     Thank you for choosing [facility]  for your care. Our goal is always to provide you with excellent care. Hearing back from our patients is one way we can continue to improve our services. Please take a few minutes to complete the written survey that you may receive in the mail after your visit with us. Thank you!             Your Updated Medication List - Protect others around you: Learn how to safely use, store and throw away your medicines at www.disposemymeds.org.          This list is accurate as of 11/19/18 12:44 PM.  Always use your most recent med list.                   Brand Name Dispense Instructions for use Diagnosis    amLODIPine 2.5 MG tablet    NORVASC    90 tablet    TAKE 1 TABLET BY MOUTH ONE TIME DAILY.    Hypertension goal BP (blood pressure) < 150/90       ASPIRIN PO      Take 81 mg by mouth daily        azithromycin 250 MG tablet    ZITHROMAX    6 tablet    Two tablets first day, then one tablet daily for four days.    Acute bronchitis with symptoms > 10 days       FISH OIL PO           fluticasone 50 MCG/ACT spray    FLONASE    16 g    Spray 2 sprays into both nostrils daily    Acute bronchitis with symptoms > 10 days       folic acid-vit B6-vit B12 0.8-10-0.115 MG Tabs per tablet    FOLGARD     Take by mouth daily        lisinopril 20 MG tablet    PRINIVIL/ZESTRIL    90 tablet    TAKE 1 TABLET BY MOUTH EVERY DAY    Hypertension goal BP (blood pressure) < 150/90       VITAMIN C PO      Take 1,000 mg by mouth daily        VITAMIN D (CHOLECALCIFEROL) PO      Take 400 Units by mouth daily

## 2018-12-14 ENCOUNTER — HOSPITAL ENCOUNTER (OUTPATIENT)
Facility: CLINIC | Age: 63
Discharge: HOME | End: 2018-12-14
Attending: COLON & RECTAL SURGERY | Admitting: COLON & RECTAL SURGERY
Payer: COMMERCIAL

## 2018-12-14 VITALS
RESPIRATION RATE: 14 BRPM | WEIGHT: 144 LBS | DIASTOLIC BLOOD PRESSURE: 103 MMHG | SYSTOLIC BLOOD PRESSURE: 160 MMHG | BODY MASS INDEX: 24.59 KG/M2 | HEIGHT: 64 IN | OXYGEN SATURATION: 95 %

## 2018-12-14 LAB
COLONOSCOPY: NORMAL
GLUCOSE BLDC GLUCOMTR-MCNC: 109 MG/DL (ref 70–99)

## 2018-12-14 PROCEDURE — 45378 DIAGNOSTIC COLONOSCOPY: CPT | Performed by: COLON & RECTAL SURGERY

## 2018-12-14 PROCEDURE — G0500 MOD SEDAT ENDO SERVICE >5YRS: HCPCS | Performed by: COLON & RECTAL SURGERY

## 2018-12-14 PROCEDURE — 25000128 H RX IP 250 OP 636: Performed by: COLON & RECTAL SURGERY

## 2018-12-14 PROCEDURE — 82962 GLUCOSE BLOOD TEST: CPT

## 2018-12-14 PROCEDURE — G0121 COLON CA SCRN NOT HI RSK IND: HCPCS | Performed by: COLON & RECTAL SURGERY

## 2018-12-14 RX ORDER — ONDANSETRON 2 MG/ML
INJECTION INTRAMUSCULAR; INTRAVENOUS PRN
Status: DISCONTINUED | OUTPATIENT
Start: 2018-12-14 | End: 2018-12-14 | Stop reason: HOSPADM

## 2018-12-14 RX ORDER — ONDANSETRON 4 MG/1
4 TABLET, ORALLY DISINTEGRATING ORAL EVERY 6 HOURS PRN
Status: DISCONTINUED | OUTPATIENT
Start: 2018-12-14 | End: 2018-12-14 | Stop reason: HOSPADM

## 2018-12-14 RX ORDER — ONDANSETRON 2 MG/ML
4 INJECTION INTRAMUSCULAR; INTRAVENOUS
Status: COMPLETED | OUTPATIENT
Start: 2018-12-14 | End: 2018-12-14

## 2018-12-14 RX ORDER — ONDANSETRON 2 MG/ML
4 INJECTION INTRAMUSCULAR; INTRAVENOUS EVERY 6 HOURS PRN
Status: DISCONTINUED | OUTPATIENT
Start: 2018-12-14 | End: 2018-12-14 | Stop reason: HOSPADM

## 2018-12-14 RX ORDER — NALOXONE HYDROCHLORIDE 0.4 MG/ML
.1-.4 INJECTION, SOLUTION INTRAMUSCULAR; INTRAVENOUS; SUBCUTANEOUS
Status: DISCONTINUED | OUTPATIENT
Start: 2018-12-14 | End: 2018-12-14 | Stop reason: HOSPADM

## 2018-12-14 RX ORDER — LIDOCAINE 40 MG/G
CREAM TOPICAL
Status: DISCONTINUED | OUTPATIENT
Start: 2018-12-14 | End: 2018-12-14 | Stop reason: HOSPADM

## 2018-12-14 RX ORDER — FENTANYL CITRATE 50 UG/ML
INJECTION, SOLUTION INTRAMUSCULAR; INTRAVENOUS PRN
Status: DISCONTINUED | OUTPATIENT
Start: 2018-12-14 | End: 2018-12-14 | Stop reason: HOSPADM

## 2018-12-14 RX ORDER — FLUMAZENIL 0.1 MG/ML
0.2 INJECTION, SOLUTION INTRAVENOUS
Status: DISCONTINUED | OUTPATIENT
Start: 2018-12-14 | End: 2018-12-14 | Stop reason: HOSPADM

## 2018-12-14 ASSESSMENT — MIFFLIN-ST. JEOR: SCORE: 1354.18

## 2018-12-14 NOTE — H&P
Pre-Endoscopy History and Physical     Micah Kelly MRN# [id number]   YOB: 1955 Age: 63 year old     Date of Procedure: 12/14/2018  Primary care provider: Gali Kennedy  Type of Endoscopy: Colonoscopy  Reason for Procedure: screening  Type of Anesthesia Anticipated: Moderate Sedation    HPI:    Micah is a 63 year old male who will be undergoing the above procedure.      A history and physical has been performed. The patient's medications and allergies have been reviewed. The risks and benefits of the procedure and the sedation options and risks were discussed with the patient.  All questions were answered and informed consent was obtained.      He denies a personal or family history of anesthesia complications or bleeding disorders.     No Known Allergies       No current facility-administered medications on file prior to encounter.   Current Outpatient Medications on File Prior to Encounter:  Ascorbic Acid (VITAMIN C PO) Take 1,000 mg by mouth daily   Omega-3 Fatty Acids (FISH OIL PO)    VITAMIN D, CHOLECALCIFEROL, PO Take 400 Units by mouth daily   ASPIRIN PO Take 81 mg by mouth daily       Patient Active Problem List   Diagnosis     CARDIOVASCULAR SCREENING; LDL GOAL LESS THAN 160     Hypertension goal BP (blood pressure) < 150/90     Need for hepatitis C screening test     Acute pain of left knee        Past Medical History:   Diagnosis Date     CARDIOVASCULAR SCREENING; LDL GOAL LESS THAN 160 2/2/2016     Hypertension         Past Surgical History:   Procedure Laterality Date     VASECTOMY  1986    uncomplicated       Social History     Tobacco Use     Smoking status: Never Smoker     Smokeless tobacco: Never Used   Substance Use Topics     Alcohol use: Yes     Alcohol/week: 0.6 oz     Types: 1 Standard drinks or equivalent per week     Comment: ocassional drink       Family History   Problem Relation Age of Onset     Colon Cancer Mother 88     Peptic Ulcer Disease Mother       "Hypertension Father 98        d age 98 of stroke     Alzheimer Disease Father      Hyperlipidemia Father      Hypothyroidism Father      Kidney Disease Father        REVIEW OF SYSTEMS:     5 point ROS negative except as noted above in HPI, including Gen., Resp., CV, GI &  system review.      PHYSICAL EXAM:   BP (!) 136/100   Resp 16   Ht 1.626 m (5' 4\")   Wt 65.3 kg (144 lb)   SpO2 94%   BMI 24.72 kg/m   Estimated body mass index is 24.72 kg/m  as calculated from the following:    Height as of this encounter: 1.626 m (5' 4\").    Weight as of this encounter: 65.3 kg (144 lb).   GENERAL APPEARANCE: healthy and alert  MENTAL STATUS: alert  AIRWAY EXAM: Mallampatti Class I (visualization of the soft palate, fauces, uvula, anterior and posterior pillars)  RESP: lungs clear to auscultation - no rales, rhonchi or wheezes  CV: regular rates and rhythm      IMPRESSION   ASA Class 1 - Healthy patient, no medical problems        PLAN:     Plan for colonoscopy. We discussed the risks, benefits and alternatives and the patient wished to proceed.    The above has been forwarded to the consulting provider.      Rere Calvo MD  [facility]  Phone: [phone]  Fax: [phone]  December 14, 2018    "

## 2018-12-14 NOTE — PROVIDER NOTIFICATION
Persistent nausea and some vomitting pt given zohphran and compazine,  And IVF  . Dr Calvo updated  Pt really would like to go home, encouraged to call if vomiting persists at home

## 2018-12-15 DIAGNOSIS — J20.9 ACUTE BRONCHITIS WITH SYMPTOMS > 10 DAYS: ICD-10-CM

## 2018-12-17 RX ORDER — FLUTICASONE PROPIONATE 50 MCG
2 SPRAY, SUSPENSION (ML) NASAL DAILY
Qty: 16 ML | Refills: 1 | Status: SHIPPED | OUTPATIENT
Start: 2018-12-17 | End: 2019-07-26

## 2018-12-17 NOTE — TELEPHONE ENCOUNTER
LOV: 11/19/2018    Prescription approved per [facility] Refill Protocol.  Thanks! Gabbi Jon RN

## 2018-12-27 ENCOUNTER — OFFICE VISIT (OUTPATIENT)
Dept: FAMILY MEDICINE | Facility: CLINIC | Age: 63
End: 2018-12-27
Payer: COMMERCIAL

## 2018-12-27 ENCOUNTER — ANCILLARY PROCEDURE (OUTPATIENT)
Dept: GENERAL RADIOLOGY | Facility: CLINIC | Age: 63
End: 2018-12-27
Attending: FAMILY MEDICINE
Payer: COMMERCIAL

## 2018-12-27 VITALS
TEMPERATURE: 98.5 F | DIASTOLIC BLOOD PRESSURE: 81 MMHG | SYSTOLIC BLOOD PRESSURE: 116 MMHG | BODY MASS INDEX: 25.58 KG/M2 | RESPIRATION RATE: 16 BRPM | HEART RATE: 83 BPM | WEIGHT: 149 LBS | OXYGEN SATURATION: 93 %

## 2018-12-27 DIAGNOSIS — M25.512 ACUTE PAIN OF BOTH SHOULDERS: ICD-10-CM

## 2018-12-27 DIAGNOSIS — Z12.11 SCREEN FOR COLON CANCER: Primary | ICD-10-CM

## 2018-12-27 DIAGNOSIS — M25.511 ACUTE PAIN OF BOTH SHOULDERS: ICD-10-CM

## 2018-12-27 PROCEDURE — 99213 OFFICE O/P EST LOW 20 MIN: CPT | Performed by: FAMILY MEDICINE

## 2018-12-27 PROCEDURE — 73030 X-RAY EXAM OF SHOULDER: CPT | Mod: LT

## 2018-12-27 NOTE — PATIENT INSTRUCTIONS
You can take up to 2000 mg of acetaminophen every 24 hours.  You can take 800 mg of ibuprofen (Advil) every 8 hours with food and water.      Patient Education     Shoulder Impingement Syndrome  The rotator cuff is a group of muscles and tendons that surround the shoulder joint. These muscles and tendons hold the arm in its joint. They help the shoulder move. The rotator cuff muscles and tendons can become irritated from repeated rubbing against the shoulder bone. This is called shoulder impingement syndrome or rotator cuff tendonitis.     If your case is mild, you may only need to rest the shoulder and then do certain exercises to strengthen the muscles. You can also take anti-inflammatory medicines. Steroid injections into the shoulder can ease inflammation. But you can have only a limited number of these. If the condition gets worse, your shoulder muscles may become thin and weak. This can lead to a rotator cuff tear.  Symptoms of shoulder impingement syndrome may include:    Shoulder pain that gets worse when you raise your arm overhead    Weakness of the shoulder muscles when you use your arm overhead    Popping and clicking when you move your shoulder    Shoulder pain that wakes you up at night, especially when you sleep on the affected shoulder    Sudden pain in your shoulder when you lift or reach  Home care  Follow these tips to take care of yourself at home:    Avoid activities that make your pain worse. These include raising your arms overhead, repeating the same motion over and over, or lifting heavy objects.    Don t hold your arm in one position for a long time. Keep it moving.    Put an ice pack on the sore area for 20 minutes every 1 to 2 hours for the first day. You can make an ice pack by putting ice cubes in a plastic bag. Wrap the bag in a towel before putting it on your shoulder. A frozen bag of peas or something similar can also be used as an ice pack. Use the ice packs 3 to 4 times a day for  the next 2 days. Continue using the ice to relieve of pain and swelling as needed.    You may take acetaminophen or ibuprofen to control pain, unless another medicine was prescribed. If prednisone was prescribed, don t take anti-inflammatory medicines. If you have chronic liver or kidney disease or ever had a stomach ulcer or gastrointestinal bleeding, talk with your doctor before using these medicines.    After your symptoms ease, you may get physical therapy or start a home exercise program. This can strengthen your shoulder muscles and help your range of motion. Talk with your doctor about what is best for your condition.  Follow-up care  Follow up with your healthcare provider, or as advised.  When to seek medical advice  Call your healthcare provider right away if any of these occur:    Shoulder pain that gets worse and wakes you up at night    Your shoulder or arm swells    Numbness, tingling, or pain that travels down the arm to the hand    Loss of shoulder strength    Fever or chills  Date Last Reviewed: 8/1/2016 2000-2018 [employer]. [address] [place] [address], [place] [address]. All rights reserved. This information is not intended as a substitute for professional medical care. Always follow your healthcare professional's instructions.

## 2018-12-27 NOTE — PROGRESS NOTES
SUBJECTIVE:   Micah Kelly is a 63 year old male who presents to clinic today for the following health issues:    Musculoskeletal problem/pain      Duration: x 6 months, getting worse, impacting sleep, hurts to sleep on either side, any kind of movement hurts, but especially reaching above his shoulders    No numbness or tingling    Description  Location: both shoulders, upper arm/biceps area    Intensity:  5/10    Accompanying signs and symptoms: sore    History  Previous similar problem: no   Previous evaluation:  none    Precipitating or alleviating factors:  Trauma or overuse: no   Aggravating factors include: lifting, throwing, hard to sleep on arms    Therapies tried and outcome: Tylenol - not helpful, Ibuprofen - a little help, exercise - not helpful    He's been doing physical therapy exercises for 6 months with no improvement      Problem list and histories reviewed & adjusted, as indicated.  Additional history: as documented    Patient Active Problem List   Diagnosis     CARDIOVASCULAR SCREENING; LDL GOAL LESS THAN 160     Hypertension goal BP (blood pressure) < 150/90     Need for hepatitis C screening test     Acute pain of left knee     Past Surgical History:   Procedure Laterality Date     COLONOSCOPY N/A 12/14/2018    Procedure: COLONOSCOPY;  Surgeon: Rere Calvo MD;  Location:  GI     VASECTOMY  1986    uncomplicated       Social History     Tobacco Use     Smoking status: Never Smoker     Smokeless tobacco: Never Used   Substance Use Topics     Alcohol use: Yes     Alcohol/week: 0.6 oz     Types: 1 Standard drinks or equivalent per week     Comment: ocassional drink     Family History   Problem Relation Age of Onset     Colon Cancer Mother 88     Peptic Ulcer Disease Mother      Hypertension Father 98        d age 98 of stroke     Alzheimer Disease Father      Hyperlipidemia Father      Hypothyroidism Father      Kidney Disease Father          No Known Allergies  BP Readings from  Last 3 Encounters:   12/27/18 116/81   12/14/18 (!) 160/103   11/19/18 131/88    Wt Readings from Last 3 Encounters:   12/27/18 67.6 kg (149 lb)   12/14/18 65.3 kg (144 lb)   11/19/18 68.3 kg (150 lb 8 oz)                    Reviewed and updated as needed this visit by clinical staff       Reviewed and updated as needed this visit by Provider         /81   Pulse 83   Temp 98.5  F (36.9  C) (Oral)   Resp 16   Wt 67.6 kg (149 lb)   SpO2 93%   BMI 25.58 kg/m     OBJECTIVE:  Vital signs as noted above.  Appearance: in no apparent distress, well developed and well nourished and alert.  Shoulder exam: tenderness on palpation AC joint right, tenderness noted bilaterally on extension and abduction > 90 degress without instability; ligaments intact, FROM shoulder joint, positive impingements signs, ipsilateral elbow, wrist and hand exam is normal.    X-ray: ordered, but results not yet available.    ASSESSMENT:  Bilateral shoulder pain with impingement suspected    PLAN:  NSAID, ice suggested  continue previously taught exercises  activity modification  referral to orthopedic specialist  See orders in [facility].  .Please see patient instructions below.

## 2019-01-04 NOTE — TELEPHONE ENCOUNTER
FUTURE VISIT INFORMATION      FUTURE VISIT INFORMATION:    Date: 1/7/19    Time:     [place] [facility]  REFERRAL INFORMATION:    Referring provider:  Gali Kennedy    Referring providers clinic:      Reason for visit/diagnosis  Acute pain of both shoulders, possible injections, scheduled per pt, ref/recs attached, Xrays in epic    RECORDS REQUESTED FROM:       Clinic name Comments Records Status Imaging Status     internal internal

## 2019-01-07 ENCOUNTER — OFFICE VISIT (OUTPATIENT)
Dept: ORTHOPEDICS | Facility: CLINIC | Age: 64
End: 2019-01-07

## 2019-01-07 ENCOUNTER — PRE VISIT (OUTPATIENT)
Dept: ORTHOPEDICS | Facility: CLINIC | Age: 64
End: 2019-01-07

## 2019-01-07 VITALS — BODY MASS INDEX: 25.44 KG/M2 | WEIGHT: 149 LBS | RESPIRATION RATE: 16 BRPM | HEIGHT: 64 IN

## 2019-01-07 DIAGNOSIS — M75.81 TENDONITIS OF BOTH ROTATOR CUFFS: Primary | ICD-10-CM

## 2019-01-07 DIAGNOSIS — M75.82 TENDONITIS OF BOTH ROTATOR CUFFS: Primary | ICD-10-CM

## 2019-01-07 ASSESSMENT — MIFFLIN-ST. JEOR: SCORE: 1376.86

## 2019-01-07 NOTE — PROGRESS NOTES
"Sports Medicine Clinic Visit     PCP: Gali Kennedy    Micah Kelly is a 63 year old male who is seen  in consultation at the request of Dr. Kennedy presenting with bilateral shoulder pain. He is RHD.    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, he also delivers item  Treatment so far consists of: Ice and exercises, ibuprofen   Prior History of related problems: None     Resp 16   Ht 1.626 m (5' 4\")   Wt 67.6 kg (149 lb)   BMI 25.58 kg/m           PMH:  Past Medical History:   Diagnosis Date     CARDIOVASCULAR SCREENING; LDL GOAL LESS THAN 160 2/2/2016     Hypertension        Active problem list:  Patient Active Problem List   Diagnosis     CARDIOVASCULAR SCREENING; LDL GOAL LESS THAN 160     Hypertension goal BP (blood pressure) < 150/90     Need for hepatitis C screening test     Acute pain of left knee       FH:  Family History   Problem Relation Age of Onset     Colon Cancer Mother 88     Peptic Ulcer Disease Mother      Hypertension Father 98        d age 98 of stroke     Alzheimer Disease Father      Hyperlipidemia Father      Hypothyroidism Father      Kidney Disease Father        SH:  Social History     Socioeconomic History     Marital status: Single     Spouse name: Not on file     Number of children: Not on file     Years of education: Not on file     Highest education level: Not on file   Social Needs     Financial resource strain: Not on file     Food insecurity - worry: Not on file     Food insecurity - inability: Not on file     Transportation needs - medical: Not on file     Transportation needs - non-medical: Not on file   Occupational History     Not on file   Tobacco Use     Smoking status: Never Smoker     Smokeless tobacco: Never Used   Substance and Sexual Activity     Alcohol " use: Yes     Alcohol/week: 0.6 oz     Types: 1 Standard drinks or equivalent per week     Comment: ocassional drink     Drug use: No     Sexual activity: No   Other Topics Concern     Parent/sibling w/ CABG, MI or angioplasty before 65F 55M? No   Social History Narrative     Not on file       MEDS:  See EMR, reviewed  ALL:  See EMR, reviewed    REVIEW OF SYSTEMS:  CONSTITUTIONAL:NEGATIVE for fever, chills, change in weight  INTEGUMENTARY/SKIN: NEGATIVE for worrisome rashes, moles or lesions  EYES: NEGATIVE for vision changes or irritation  ENT/MOUTH: NEGATIVE for ear, mouth and throat problems  RESP:NEGATIVE for significant cough or SOB  BREAST: NEGATIVE for masses, tenderness or discharge  CV: NEGATIVE for chest pain, palpitations or peripheral edema  GI: NEGATIVE for nausea, abdominal pain, heartburn, or change in bowel habits  :NEGATIVE for frequency, dysuria, or hematuria  :NEGATIVE for frequency, dysuria, or hematuria  NEURO: NEGATIVE for weakness, dizziness or paresthesias  ENDOCRINE: NEGATIVE for temperature intolerance, skin/hair changes  HEME/ALLERGY/IMMUNE: NEGATIVE for bleeding problems  PSYCHIATRIC: NEGATIVE for changes in mood or affect          Subjective: This 63-year-old male has noted impingement complaints with the bilateral shoulder with overhead reaching and outstretched arm.  It bothers him when he tries to throw a ball to his grandchild.  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  Will ache at night when he is trying to sleep.  He can think of no particular injury to the shoulder.  Is not associated with neck pain or numbness and tingling in the extremity.  Is consistent impingement complaints.  He denies past injuries to the shoulder.      Objective: He has full active and passive range of motion of both shoulders but impingement signs are positive.  He is tender over the anterior cuff bilaterally but  nontender over the AC joint or biceps tendon.  Strength is intact bilaterally at deltoid supraspinatus infraspinatus and subscapularis.  He tends towards a head forward shoulder forward posture.  There is no scapular winging.  He has improvements that can be made in scapular positioning.  Distal pulses and sensation are intact.  Overlying skin is intact.  No signs of joint effusion.  Appropriate in conversation and affect.    X-rays show a type I-II acromion bilaterally with no significant glenohumeral DJD.  There may be some very small spurring at the peripheral humeral head but no signs of significant degenerative joint disease about the glenohumeral joints with either shoulder.    Assessment bilateral shoulder impingement suspect rotator cuff tendinitis    Plan: He will see physical therapy for a posterior shoulder protocol.  If not improving subacromial injections could be considered.  We discussed the possibility of Carlos technique training.  He was given a contact information person for this at the [place].  We discussed a book for musicians that discusses ergonomics.  If he is not improving with conservative cares and injection he knows an MRI could be considered.  Follow-up if not improving.    This note was created with the use of Dragon software and unintentional spelling or errors may have occurred.

## 2019-01-07 NOTE — LETTER
"  1/7/2019      RE: Micah Kelly  [address]  [facility] [address]       Sports Medicine Clinic Visit     PCP: Gali Kennedy    Micah Kelly is a 63 year old male who is seen  in consultation at the request of Dr. Kennedy presenting with bilateral shoulder pain. He is RHD.    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, he also delivers item  Treatment so far consists of: Ice and exercises, ibuprofen   Prior History of related problems: None     Resp 16   Ht 1.626 m (5' 4\")   Wt 67.6 kg (149 lb)   BMI 25.58 kg/m            PMH:  Past Medical History:   Diagnosis Date     CARDIOVASCULAR SCREENING; LDL GOAL LESS THAN 160 2/2/2016     Hypertension        Active problem list:  Patient Active Problem List   Diagnosis     CARDIOVASCULAR SCREENING; LDL GOAL LESS THAN 160     Hypertension goal BP (blood pressure) < 150/90     Need for hepatitis C screening test     Acute pain of left knee       FH:  Family History   Problem Relation Age of Onset     Colon Cancer Mother 88     Peptic Ulcer Disease Mother      Hypertension Father 98        d age 98 of stroke     Alzheimer Disease Father      Hyperlipidemia Father      Hypothyroidism Father      Kidney Disease Father        SH:  Social History     Socioeconomic History     Marital status: Single     Spouse name: Not on file     Number of children: Not on file     Years of education: Not on file     Highest education level: Not on file   Social Needs     Financial resource strain: Not on file     Food insecurity - worry: Not on file     Food insecurity - inability: Not on file     Transportation needs - medical: Not on file     Transportation needs - non-medical: Not on file   Occupational History     Not on file   Tobacco Use     Smoking status: Never " Smoker     Smokeless tobacco: Never Used   Substance and Sexual Activity     Alcohol use: Yes     Alcohol/week: 0.6 oz     Types: 1 Standard drinks or equivalent per week     Comment: ocassional drink     Drug use: No     Sexual activity: No   Other Topics Concern     Parent/sibling w/ CABG, MI or angioplasty before 65F 55M? No   Social History Narrative     Not on file       MEDS:  See EMR, reviewed  ALL:  See EMR, reviewed    REVIEW OF SYSTEMS:  CONSTITUTIONAL:NEGATIVE for fever, chills, change in weight  INTEGUMENTARY/SKIN: NEGATIVE for worrisome rashes, moles or lesions  EYES: NEGATIVE for vision changes or irritation  ENT/MOUTH: NEGATIVE for ear, mouth and throat problems  RESP:NEGATIVE for significant cough or SOB  BREAST: NEGATIVE for masses, tenderness or discharge  CV: NEGATIVE for chest pain, palpitations or peripheral edema  GI: NEGATIVE for nausea, abdominal pain, heartburn, or change in bowel habits  :NEGATIVE for frequency, dysuria, or hematuria  :NEGATIVE for frequency, dysuria, or hematuria  NEURO: NEGATIVE for weakness, dizziness or paresthesias  ENDOCRINE: NEGATIVE for temperature intolerance, skin/hair changes  HEME/ALLERGY/IMMUNE: NEGATIVE for bleeding problems  PSYCHIATRIC: NEGATIVE for changes in mood or affect          Subjective: This 63-year-old male has noted impingement complaints with the bilateral shoulder with overhead reaching and outstretched arm.  It bothers him when he tries to throw a ball to his grandchild.  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  Will ache at night when he is trying to sleep.  He can think of no particular injury to the shoulder.  Is not associated with neck pain or numbness and tingling in the extremity.  Is consistent impingement complaints.  He denies past injuries to the shoulder.      Objective: He has full active and passive range of motion of both shoulders but  impingement signs are positive.  He is tender over the anterior cuff bilaterally but nontender over the AC joint or biceps tendon.  Strength is intact bilaterally at deltoid supraspinatus infraspinatus and subscapularis.  He tends towards a head forward shoulder forward posture.  There is no scapular winging.  He has improvements that can be made in scapular positioning.  Distal pulses and sensation are intact.  Overlying skin is intact.  No signs of joint effusion.  Appropriate in conversation and affect.    X-rays show a type I-II acromion bilaterally with no significant glenohumeral DJD.  There may be some very small spurring at the peripheral humeral head but no signs of significant degenerative joint disease about the glenohumeral joints with either shoulder.    Assessment bilateral shoulder impingement suspect rotator cuff tendinitis    Plan: He will see physical therapy for a posterior shoulder protocol.  If not improving subacromial injections could be considered.  We discussed the possibility of Carlos technique training.  He was given a contact information person for this at the [facility].  We discussed a book for musicians that discusses ergonomics.  If he is not improving with conservative cares and injection he knows an MRI could be considered.  Follow-up if not improving.    This note was created with the use of Dragon software and unintentional spelling or errors may have occurred.        Richard Bowen MD

## 2019-01-11 ENCOUNTER — THERAPY VISIT (OUTPATIENT)
Dept: PHYSICAL THERAPY | Facility: CLINIC | Age: 64
End: 2019-01-11
Payer: COMMERCIAL

## 2019-01-11 DIAGNOSIS — M25.512 ACUTE PAIN OF BOTH SHOULDERS: Primary | ICD-10-CM

## 2019-01-11 DIAGNOSIS — M25.511 ACUTE PAIN OF BOTH SHOULDERS: Primary | ICD-10-CM

## 2019-01-11 PROCEDURE — 97110 THERAPEUTIC EXERCISES: CPT | Mod: GP | Performed by: PHYSICAL THERAPIST

## 2019-01-11 PROCEDURE — 97161 PT EVAL LOW COMPLEX 20 MIN: CPT | Mod: GP | Performed by: PHYSICAL THERAPIST

## 2019-01-11 NOTE — PROGRESS NOTES
[facility] Initial Evaluation  Subjective:  [facility]                  Physical Therapy Initial Examination/Evaluation  January 11, 2019    Micah Kelly is a 63 year old male referred to physical therapy by Dr. Bowen for treatment of SACHI shoulder pain with Precautions/Restrictions/MD instructions none  Pain is constant  Falls asleep but it wakes him up with a burn.  Pt is a back sleeper-wakes up after 4 hours.  Cannot lift heavy.  Cannot throw football without pain with grandson.    Subjective:  DOI/onset: 10/10/18 DOS: none  Acute Injury or Gradual Onset?: Gradual injury over time  Mechanism of Injury: unknown  Related PMH: none Previous Treatment: Nothing Imaging: x-ray  Chief Complaint/Functional Limitations:   Right greater than left, right hand dominant and see below in therapy evaluation codes   Pain: rest 5 /10, activity 10/10 Location: radiates and burns into lateral arm Frequency: Constant Described as: radiate/burn Alleviated by: Nothing Progression of Symptoms: Gradually getting worse. 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  [employer]  Job duties: prolonged sitting, keyboarding/computer use, lifting/carrying  Current HEP/exercise regimen: walk, neighbor is PT  Patient's goals are see chief complaints decrease pain    Other pertinent PMH/Red Flags: High Blood Pressure, Pain at Night/Rest   Barriers at home/work: None as reported by patient  Pertinent Surgical History: not at this time  Medications: Anti-inflammatory  General health as reported by patient: good  Return to MD:  Not at this time      Objective:  System  SHOULDER EXAMINATION  Diagnosis: right> left     CERVICAL SCREEN  AROM: WNL/symmetrical    THORACIC SPINE SCREEN  WNL  Spring Testing: Hypomobile T4,5    STATIC POSTURE  Forward head: mild   Rounded  shoulders:mild  SHOULDER RANGE OF MOTION  AROM Flexion Abduction ER   Base     Left Full and mild pain full Full and mild pain     Right Full and moderate pain full Full and mod pain               SHOULDER STRENGTH  HHD Flexion  Base ER    Left 4+/5 mild pain  4+/5    Right 4-/5 and pain  4/5 and pain      SPECIAL TESTS Left Right   Difficult to interpret because of pain  PALPATION  Left: BLHT   Right: BLHT    Assessment/Plan:  Patient is a 63 year old male with right side shoulder complaints.    Patient has the following significant findings with corresponding treatment plan.                Diagnosis 1:  Right shoulder BLHT tendinoapthy, sps tendinopathy  Pain -  hot/cold therapy, manual therapy, splint/taping/bracing/orthotics, self management, education and home program  Decreased ROM/flexibility - manual therapy and therapeutic exercise  Decreased strength - therapeutic exercise and therapeutic activities  Decreased function - therapeutic activities    Therapy Evaluation Codes:   1) History comprised of:   Personal factors that impact the plan of care:      None.    Comorbidity factors that impact the plan of care are:      None.     Medications impacting care: None.  2) Examination of Body Systems comprised of:   Body structures and functions that impact the plan of care:      Shoulder.   Activity limitations that impact the plan of care are:      Lifting and Throwing.  3) Clinical presentation characteristics are:   Stable/Uncomplicated.  4) Decision-Making    Low complexity using standardized patient assessment instrument and/or measureable assessment of functional outcome.  Cumulative Therapy Evaluation is: Low complexity.    Previous and current functional limitations:  (See Goal Flow Sheet for this information)    Short term and Long term goals: (See Goal Flow Sheet for this information)     Communication ability:  Patient appears to be able to clearly communicate and understand verbal and written  communication and follow directions correctly.  Treatment Explanation - The following has been discussed with the patient:   RX ordered/plan of care  Anticipated outcomes  Possible risks and side effects  This patient would benefit from PT intervention to resume normal activities.   Rehab potential is good.    Frequency:  1 X week, once daily  Duration:  for 3 months  Discharge Plan:  Achieve all LTG.  Independent in home treatment program.  Reach maximal therapeutic benefit.    Please refer to the daily flowsheet for treatment today, total treatment time and time spent performing 1:1 timed codes.       Physical Exam    General     ROS

## 2019-01-18 ENCOUNTER — THERAPY VISIT (OUTPATIENT)
Dept: PHYSICAL THERAPY | Facility: CLINIC | Age: 64
End: 2019-01-18
Payer: COMMERCIAL

## 2019-01-18 DIAGNOSIS — M75.82 TENDONITIS OF BOTH ROTATOR CUFFS: ICD-10-CM

## 2019-01-18 DIAGNOSIS — M75.81 TENDONITIS OF BOTH ROTATOR CUFFS: ICD-10-CM

## 2019-01-18 PROCEDURE — 97110 THERAPEUTIC EXERCISES: CPT | Mod: GP | Performed by: PHYSICAL THERAPIST

## 2019-01-18 PROCEDURE — 97140 MANUAL THERAPY 1/> REGIONS: CPT | Mod: GP | Performed by: PHYSICAL THERAPIST

## 2019-01-25 ENCOUNTER — THERAPY VISIT (OUTPATIENT)
Dept: PHYSICAL THERAPY | Facility: CLINIC | Age: 64
End: 2019-01-25
Payer: COMMERCIAL

## 2019-01-25 DIAGNOSIS — S49.90XA SHOULDER INJURY: Primary | ICD-10-CM

## 2019-01-25 PROCEDURE — 97110 THERAPEUTIC EXERCISES: CPT | Mod: GP | Performed by: PHYSICAL THERAPIST

## 2019-01-25 PROCEDURE — 97140 MANUAL THERAPY 1/> REGIONS: CPT | Mod: GP | Performed by: PHYSICAL THERAPIST

## 2019-02-01 ENCOUNTER — THERAPY VISIT (OUTPATIENT)
Dept: PHYSICAL THERAPY | Facility: CLINIC | Age: 64
End: 2019-02-01
Payer: COMMERCIAL

## 2019-02-01 DIAGNOSIS — S49.90XA SHOULDER INJURY: Primary | ICD-10-CM

## 2019-02-01 PROCEDURE — 97110 THERAPEUTIC EXERCISES: CPT | Mod: GP | Performed by: PHYSICAL THERAPIST

## 2019-02-01 PROCEDURE — 97140 MANUAL THERAPY 1/> REGIONS: CPT | Mod: GP | Performed by: PHYSICAL THERAPIST

## 2019-06-20 DIAGNOSIS — I10 HYPERTENSION GOAL BP (BLOOD PRESSURE) < 150/90: ICD-10-CM

## 2019-06-20 NOTE — TELEPHONE ENCOUNTER
"Requested Prescriptions   Pending Prescriptions Disp Refills     amLODIPine (NORVASC) 2.5 MG tablet [Pharmacy Med Name: AMLODIPINE BESYLATE 2.5 MG TAB] 90 tablet 2     Sig: TAKE 1 TABLET BY MOUTH ONE TIME DAILY.  Last Written Prescription Date:  9/6/2018  Last Fill Quantity: 90 tablet,  # refills: 2   Last Office Visit: 12/27/2018   Future Office Visit:            Calcium Channel Blockers Protocol  Failed - 6/20/2019 10:30 AM        Failed - Normal serum creatinine on file in past 12 months     Recent Labs   Lab Test 06/19/18  1248   CR 0.78           Passed - Blood pressure under 140/90 in past 12 months     BP Readings from Last 3 Encounters:   12/27/18 116/81   12/14/18 (!) 160/103   11/19/18 131/88           Passed - Recent (12 mo) or future (30 days) visit within the authorizing provider's specialty     Patient had office visit in the last 12 months or has a visit in the next 30 days with authorizing provider or within the authorizing provider's specialty.  See \"Patient Info\" tab in inbasket, or \"Choose Columns\" in Meds & Orders section of the refill encounter.            Passed - Medication is active on med list        Passed - Patient is age 18 or older     _________________________________________________________________       lisinopril (PRINIVIL/ZESTRIL) 20 MG tablet [Pharmacy Med Name: LISINOPRIL 20 MG TABLET] 90 tablet 2     Sig: TAKE 1 TABLET BY MOUTH EVERY DAY  Last Written Prescription Date:  9/7/2018  Last Fill Quantity: 90 tablet,  # refills: 2   Last Office Visit: 12/27/2018   Future Office Visit:            ACE Inhibitors (Including Combos) Protocol Failed - 6/20/2019 10:30 AM        Failed - Normal serum creatinine on file in past 12 months     Recent Labs   Lab Test 06/19/18  1248   CR 0.78           Failed - Normal serum potassium on file in past 12 months     Recent Labs   Lab Test 06/19/18  1248   POTASSIUM 4.6           Passed - Blood pressure under 140/90 in past 12 months     BP Readings " "from Last 3 Encounters:   12/27/18 116/81   12/14/18 (!) 160/103   11/19/18 131/88           Passed - Recent (12 mo) or future (30 days) visit within the authorizing provider's specialty     Patient had office visit in the last 12 months or has a visit in the next 30 days with authorizing provider or within the authorizing provider's specialty.  See \"Patient Info\" tab in inbasket, or \"Choose Columns\" in Meds & Orders section of the refill encounter.            Passed - Medication is active on med list        Passed - Patient is age 18 or older          "

## 2019-06-23 RX ORDER — AMLODIPINE BESYLATE 2.5 MG/1
TABLET ORAL
Qty: 30 TABLET | Refills: 0 | Status: SHIPPED | OUTPATIENT
Start: 2019-06-23 | End: 2019-07-16

## 2019-06-23 RX ORDER — LISINOPRIL 20 MG/1
TABLET ORAL
Qty: 30 TABLET | Refills: 0 | Status: SHIPPED | OUTPATIENT
Start: 2019-06-23 | End: 2019-07-16

## 2019-06-23 NOTE — TELEPHONE ENCOUNTER
Medication is being filled for 1 time refill only due to:  Patient needs labs BMP. Future labs ordered BMP.     Signed Prescriptions:                        Disp   Refills    amLODIPine (NORVASC) 2.5 MG tablet         30 tab*0        Sig: TAKE 1 TABLET BY MOUTH ONE TIME DAILY.  Authorizing Provider: MARV FREEMAN  Ordering User: SAKINA DUBOIS    lisinopril (PRINIVIL/ZESTRIL) 20 MG tablet 30 tab*0        Sig: TAKE 1 TABLET BY MOUTH EVERY DAY  Authorizing Provider: MARV FREEMAN  Ordering User: SAKINA DUBOIS     Reception - one month due for labs

## 2019-07-10 DIAGNOSIS — I10 HYPERTENSION GOAL BP (BLOOD PRESSURE) < 150/90: ICD-10-CM

## 2019-07-10 PROCEDURE — 80048 BASIC METABOLIC PNL TOTAL CA: CPT | Performed by: FAMILY MEDICINE

## 2019-07-10 PROCEDURE — 36415 COLL VENOUS BLD VENIPUNCTURE: CPT | Performed by: FAMILY MEDICINE

## 2019-07-10 NOTE — LETTER
July 12, 2019      Micah TRACEY Leticia  [address]  [facility] [address]        Dear ,    We are writing to inform you of your test results.    Thank you for getting labs done!  Everything looks great!     Your glucose level, electrolyte level and kidney function, measured with a test called the basic metabolic panel, is normal, which is great news!     If you have any questions, please contact the clinic or schedule an appointment with me, thank you!     Resulted Orders   Basic metabolic panel   Result Value Ref Range    Sodium 141 133 - 144 mmol/L    Potassium 4.4 3.4 - 5.3 mmol/L    Chloride 107 94 - 109 mmol/L    Carbon Dioxide 26 20 - 32 mmol/L    Anion Gap 8 3 - 14 mmol/L    Glucose 63 (L) 70 - 99 mg/dL    Urea Nitrogen 23 7 - 30 mg/dL    Creatinine 0.87 0.66 - 1.25 mg/dL    GFR Estimate >90 >60 mL/min/[1.73_m2]      Comment:      Non  GFR Calc  Starting 12/18/2018, serum creatinine based estimated GFR (eGFR) will be   calculated using the Chronic Kidney Disease Epidemiology Collaboration   (CKD-EPI) equation.      GFR Estimate If Black >90 >60 mL/min/[1.73_m2]      Comment:       GFR Calc  Starting 12/18/2018, serum creatinine based estimated GFR (eGFR) will be   calculated using the Chronic Kidney Disease Epidemiology Collaboration   (CKD-EPI) equation.      Calcium 8.4 (L) 8.5 - 10.1 mg/dL       If you have any questions or concerns, please call the clinic at the number listed above.       Sincerely,    Gali Kennedy MD/nr

## 2019-07-11 LAB
ANION GAP SERPL CALCULATED.3IONS-SCNC: 8 MMOL/L (ref 3–14)
BUN SERPL-MCNC: 23 MG/DL (ref 7–30)
CALCIUM SERPL-MCNC: 8.4 MG/DL (ref 8.5–10.1)
CHLORIDE SERPL-SCNC: 107 MMOL/L (ref 94–109)
CO2 SERPL-SCNC: 26 MMOL/L (ref 20–32)
CREAT SERPL-MCNC: 0.87 MG/DL (ref 0.66–1.25)
GFR SERPL CREATININE-BSD FRML MDRD: >90 ML/MIN/{1.73_M2}
GLUCOSE SERPL-MCNC: 63 MG/DL (ref 70–99)
POTASSIUM SERPL-SCNC: 4.4 MMOL/L (ref 3.4–5.3)
SODIUM SERPL-SCNC: 141 MMOL/L (ref 133–144)

## 2019-07-11 NOTE — RESULT ENCOUNTER NOTE
Thank you for getting labs done!  Everything looks great!    Your glucose level, electrolyte level and kidney function, measured with a test called the basic metabolic panel, is normal, which is great news!     If you have any questions, please contact the clinic or schedule an appointment with me, thank you!      Sincerely,    MARV FREEMAN MD   7/11/2019

## 2019-07-16 DIAGNOSIS — I10 HYPERTENSION GOAL BP (BLOOD PRESSURE) < 150/90: ICD-10-CM

## 2019-07-16 NOTE — TELEPHONE ENCOUNTER
"Requested Prescriptions   Pending Prescriptions Disp Refills     amLODIPine (NORVASC) 2.5 MG tablet [Pharmacy Med Name: AMLODIPINE BESYLATE 2.5 MG TAB] 30 tablet 0     Sig: TAKE 1 TABLET BY MOUTH EVERY DAY  Last Written Prescription Date:  6/23/2019  Last Fill Quantity: 30 tablet,  # refills: 0   Last Office Visit: 12/27/2018   Future Office Visit:            Calcium Channel Blockers Protocol  Passed - 7/16/2019 10:33 AM        Passed - Blood pressure under 140/90 in past 12 months     BP Readings from Last 3 Encounters:   12/27/18 116/81   12/14/18 (!) 160/103   11/19/18 131/88           Passed - Recent (12 mo) or future (30 days) visit within the authorizing provider's specialty     Patient had office visit in the last 12 months or has a visit in the next 30 days with authorizing provider or within the authorizing provider's specialty.  See \"Patient Info\" tab in inbasket, or \"Choose Columns\" in Meds & Orders section of the refill encounter.            Passed - Medication is active on med list        Passed - Patient is age 18 or older        Passed - Normal serum creatinine on file in past 12 months     Recent Labs   Lab Test 07/10/19  1404   CR 0.87        _________________________________________________________________       lisinopril (PRINIVIL/ZESTRIL) 20 MG tablet [Pharmacy Med Name: LISINOPRIL 20 MG TABLET] 30 tablet 0     Sig: TAKE 1 TABLET BY MOUTH EVERY DAY  Last Written Prescription Date:  6/23/2019  Last Fill Quantity: 30 tablet,  # refills: 0   Last Office Visit: 12/27/2018   Future Office Visit:            ACE Inhibitors (Including Combos) Protocol Passed - 7/16/2019 10:33 AM        Passed - Blood pressure under 140/90 in past 12 months     BP Readings from Last 3 Encounters:   12/27/18 116/81   12/14/18 (!) 160/103   11/19/18 131/88           Passed - Recent (12 mo) or future (30 days) visit within the authorizing provider's specialty     Patient had office visit in the last 12 months or has a visit " "in the next 30 days with authorizing provider or within the authorizing provider's specialty.  See \"Patient Info\" tab in inbasket, or \"Choose Columns\" in Meds & Orders section of the refill encounter.            Passed - Medication is active on med list        Passed - Patient is age 18 or older        Passed - Normal serum creatinine on file in past 12 months     Recent Labs   Lab Test 07/10/19  1404   CR 0.87           Passed - Normal serum potassium on file in past 12 months     Recent Labs   Lab Test 07/10/19  1404   POTASSIUM 4.4               "

## 2019-07-18 RX ORDER — AMLODIPINE BESYLATE 2.5 MG/1
TABLET ORAL
Qty: 90 TABLET | Refills: 1 | Status: SHIPPED | OUTPATIENT
Start: 2019-07-18 | End: 2020-01-22

## 2019-07-18 RX ORDER — LISINOPRIL 20 MG/1
TABLET ORAL
Qty: 90 TABLET | Refills: 1 | Status: SHIPPED | OUTPATIENT
Start: 2019-07-18 | End: 2020-01-25

## 2019-07-18 NOTE — TELEPHONE ENCOUNTER
Signed Prescriptions:                        Disp   Refills    amLODIPine (NORVASC) 2.5 MG tablet         90 tab*1        Sig: TAKE 1 TABLET BY MOUTH EVERY DAY  Authorizing Provider: MARV FREEMAN  Ordering User: SAKINA DUBOIS    lisinopril (PRINIVIL/ZESTRIL) 20 MG tablet 90 tab*1        Sig: TAKE 1 TABLET BY MOUTH EVERY DAY  Authorizing Provider: MARV FREEMAN  Ordering User: SAKINA DUBOIS

## 2019-07-26 DIAGNOSIS — J20.9 ACUTE BRONCHITIS WITH SYMPTOMS > 10 DAYS: ICD-10-CM

## 2019-07-26 NOTE — TELEPHONE ENCOUNTER
"Requested Prescriptions   Pending Prescriptions Disp Refills     fluticasone (FLONASE) 50 MCG/ACT nasal spray [Pharmacy Med Name: FLUTICASONE PROP 50 MCG SPRAY]  Last Written Prescription Date:  12/17/2018  Last Fill Quantity: 16 ml,  # refills: 1   Last office visit: 12/27/2018 with prescribing provider:  Preet   Future Office Visit:     16 mL 1     Sig: SPRAY 2 SPRAYS INTO BOTH NOSTRILS DAILY       Inhaled Steroids Protocol Passed - 7/26/2019  3:27 AM        Passed - Patient is age 12 or older        Passed - Recent (12 mo) or future (30 days) visit within the authorizing provider's specialty     Patient had office visit in the last 12 months or has a visit in the next 30 days with authorizing provider or within the authorizing provider's specialty.  See \"Patient Info\" tab in inbasket, or \"Choose Columns\" in Meds & Orders section of the refill encounter.              Passed - Medication is active on med list          "

## 2019-07-29 RX ORDER — FLUTICASONE PROPIONATE 50 MCG
2 SPRAY, SUSPENSION (ML) NASAL DAILY
Qty: 16 ML | Refills: 1 | Status: SHIPPED | OUTPATIENT
Start: 2019-07-29 | End: 2019-08-02

## 2019-08-02 DIAGNOSIS — J20.9 ACUTE BRONCHITIS WITH SYMPTOMS > 10 DAYS: ICD-10-CM

## 2019-08-03 NOTE — TELEPHONE ENCOUNTER
"Requested Prescriptions   Pending Prescriptions Disp Refills     fluticasone (FLONASE) 50 MCG/ACT nasal spray [Pharmacy Med Name: FLUTICASONE PROP 50 MCG SPRAY]  Last Written Prescription Date:  7/29/2019  Last Fill Quantity: 16 ml,  # refills: 1   Last office visit: 12/27/2018 with prescribing provider:  Preet   Future Office Visit:     16 mL 1     Sig: SPRAY 2 SPRAYS INTO BOTH NOSTRILS DAILY       Inhaled Steroids Protocol Passed - 8/2/2019 12:39 PM        Passed - Patient is age 12 or older        Passed - Recent (12 mo) or future (30 days) visit within the authorizing provider's specialty     Patient had office visit in the last 12 months or has a visit in the next 30 days with authorizing provider or within the authorizing provider's specialty.  See \"Patient Info\" tab in inbasket, or \"Choose Columns\" in Meds & Orders section of the refill encounter.              Passed - Medication is active on med list          "

## 2019-08-06 RX ORDER — FLUTICASONE PROPIONATE 50 MCG
2 SPRAY, SUSPENSION (ML) NASAL DAILY
Qty: 16 ML | Refills: 1 | Status: SHIPPED | OUTPATIENT
Start: 2019-08-06 | End: 2019-12-31

## 2019-09-17 PROBLEM — M25.562 ACUTE PAIN OF LEFT KNEE: Status: RESOLVED | Noted: 2018-03-05 | Resolved: 2019-09-17

## 2019-12-27 ENCOUNTER — OFFICE VISIT (OUTPATIENT)
Dept: URGENT CARE | Facility: URGENT CARE | Age: 64
End: 2019-12-27
Payer: COMMERCIAL

## 2019-12-27 VITALS
HEART RATE: 83 BPM | WEIGHT: 150 LBS | SYSTOLIC BLOOD PRESSURE: 116 MMHG | OXYGEN SATURATION: 97 % | RESPIRATION RATE: 14 BRPM | DIASTOLIC BLOOD PRESSURE: 70 MMHG | BODY MASS INDEX: 25.61 KG/M2 | HEIGHT: 64 IN | TEMPERATURE: 98.2 F

## 2019-12-27 DIAGNOSIS — J20.9 ACUTE BRONCHITIS, UNSPECIFIED ORGANISM: Primary | ICD-10-CM

## 2019-12-27 PROCEDURE — 99213 OFFICE O/P EST LOW 20 MIN: CPT | Performed by: FAMILY MEDICINE

## 2019-12-27 ASSESSMENT — MIFFLIN-ST. JEOR: SCORE: 1376.4

## 2019-12-27 NOTE — PROGRESS NOTES
Subjective 6 days ago started what felt like a cold with mainly coughing, does cough up thick yellow mucus, tired, harder to sleep, actually feeling better today but worried about pneumonia.  He has never had pneumonia.  He does not have asthma.    Objective: Vitals are stable.  ENT is normal.  Neck is normal.  Lungs are clear.  Heart is regular without murmurs.    Assessment and plan: This seems like a viral bronchitis, watch for second sickening but otherwise I think he can wait it out.

## 2020-01-07 NOTE — PROGRESS NOTES
Subjective     Micah Kelly is a 64 year old male who presents to clinic today for the following health issues:    HPI   Musculoskeletal problem/pain      Duration: Friday    Description  Location: right shoulder    Intensity:  moderate    Accompanying signs and symptoms: limited ROM, unable to lift right arm, pain radiates into right side neck    History  Previous similar problem: YES- previous right rotator cuff injury  Previous evaluation:  none    Precipitating or alleviating factors:  Trauma or overuse: YES- fell on ice, landed on right side, hit right shoulder and right hip  Aggravating factors include: pressing on shoulder, fast movement    Therapies tried and outcome: ice and Ibuprofen    Fell on right hip and shoulder Friday.  Hard pop in shoulder, it went numb.  It has been constantly painful since the fall.  Arm no longer numb.  Unable to get arm higher than 90 degrees.  He is right handed.  Fell on lateral aspect of humeruse, this is painful and radiates up shoulder.  Ice helps.  Ibuprofen 600mg before bed helps sleep.  Does note difficulty opening can or bottle with right hand.  No numbness or tingling of right hand.    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  Reaching hand out is difficult.      Tore rotator cuff January last year, this feels the same.        Patient Active Problem List   Diagnosis     Hypertension goal BP (blood pressure) < 150/90     Past Surgical History:   Procedure Laterality Date     COLONOSCOPY N/A 12/14/2018    Procedure: COLONOSCOPY;  Surgeon: Rere Calvo MD;  Location:  GI     VASECTOMY  1986    uncomplicated       Social History     Tobacco Use     Smoking status: Never Smoker     Smokeless tobacco: Never Used   Substance Use Topics     Alcohol use: Yes     Alcohol/week: 1.0 standard drinks     Types: 1 Standard drinks or equivalent per week     Comment: ocassional drink     Family History   Problem Relation Age of Onset     Colon Cancer  Mother 88     Peptic Ulcer Disease Mother      Hypertension Father 98        d age 98 of stroke     Alzheimer Disease Father      Hyperlipidemia Father      Hypothyroidism Father      Kidney Disease Father          Current Outpatient Medications   Medication Sig Dispense Refill     amLODIPine (NORVASC) 2.5 MG tablet TAKE 1 TABLET BY MOUTH EVERY DAY 90 tablet 1     Ascorbic Acid (VITAMIN C PO) Take 1,000 mg by mouth daily       ASPIRIN PO Take 81 mg by mouth daily       folic acid-vit B6-vit B12 (FOLGARD) 0.8-10-0.115 MG TABS per tablet Take by mouth daily       lisinopril (PRINIVIL/ZESTRIL) 20 MG tablet TAKE 1 TABLET BY MOUTH EVERY DAY 90 tablet 1     Omega-3 Fatty Acids (FISH OIL PO)        VITAMIN D, CHOLECALCIFEROL, PO Take 400 Units by mouth daily           Reviewed and updated as needed this visit by Provider         Review of Systems   ROS COMP: Constitutional, HEENT, cardiovascular, pulmonary, gi and gu systems are negative, except as otherwise noted.      Objective    /82 (BP Location: Right arm, Patient Position: Chair, Cuff Size: Adult Regular)   Pulse 95   Temp 98.2  F (36.8  C) (Oral)   Resp 20   Wt 67.6 kg (149 lb)   SpO2 94%   BMI 25.58 kg/m    Body mass index is 25.58 kg/m .  Physical Exam   GENERAL APPEARANCE: healthy, alert and no distress  EYES: Eyes grossly normal to inspection and conjunctivae and sclerae normal  RESP: respirations nonlabored  Inspection:  Shoulders appear symmetrical, no asymmetry, discoloration, swelling, bruising, or obvious deformity.    Palpation:  No pain with palpation of the SC joint, clavicle, AC joint, or proximal bicep tendon.    Active ROM:  Right abduction limited to 75 degrees  Forward flexion limited to 75 degrees  Internal ROM very liited  Full external ROM  Strength:  Right external rotation: 5/5, R internal rotation: -5/5  Left external rotation: 5/5, L internal rotation, 5/5  Ibarra: positive to right  Empty Can's test: -5/5 to right with  pain    PSYCH: mentation appears normal and affect normal/bright       Diagnostic Test Results:  Xray right shoulder without evidence of fracture on my interpretation, final radiology read pending        Assessment & Plan     (M25.511) Acute pain of right shoulder  (primary encounter diagnosis)  Comment: given weakness and limited ROM strong suspicion for tear  Plan: XR Shoulder Right 2 Views, CYN PT, HAND, AND         CHIROPRACTIC REFERRAL        Discussed suspicion for rotator cuff tear and depending on degree may be candidate for surgery.  He would like to start with conservative management, surgery would be a lst resort for him.  I suggest MRI to evaluate degree of tear, he has bad claustrophobia.  discussed option of open air MRI or premedication with ativan.  He wishes to defer this for now.  Will start with physical therapy.  Continue NSAIDs and ice.  If not seeing improvement in 1 month will proceed with MRI.    (Z23) Need for prophylactic vaccination and inoculation against influenza  Comment:   Plan: INFLUENZA (HIGH DOSE) 3 VALENT VACCINE [30543],        ADMIN INFLUENZA (For MEDICARE Patients ONLY)         []              See Patient Instructions    No follow-ups on file.    CHRISTIAN Meraz [facility]

## 2020-01-08 ENCOUNTER — ANCILLARY PROCEDURE (OUTPATIENT)
Dept: GENERAL RADIOLOGY | Facility: CLINIC | Age: 65
End: 2020-01-08
Attending: NURSE PRACTITIONER
Payer: COMMERCIAL

## 2020-01-08 ENCOUNTER — OFFICE VISIT (OUTPATIENT)
Dept: FAMILY MEDICINE | Facility: CLINIC | Age: 65
End: 2020-01-08
Payer: COMMERCIAL

## 2020-01-08 VITALS
RESPIRATION RATE: 20 BRPM | TEMPERATURE: 98.2 F | DIASTOLIC BLOOD PRESSURE: 82 MMHG | OXYGEN SATURATION: 94 % | SYSTOLIC BLOOD PRESSURE: 132 MMHG | WEIGHT: 149 LBS | HEART RATE: 95 BPM | BODY MASS INDEX: 25.58 KG/M2

## 2020-01-08 DIAGNOSIS — M25.511 ACUTE PAIN OF RIGHT SHOULDER: Primary | ICD-10-CM

## 2020-01-08 DIAGNOSIS — M25.511 ACUTE PAIN OF RIGHT SHOULDER: ICD-10-CM

## 2020-01-08 DIAGNOSIS — Z23 NEED FOR PROPHYLACTIC VACCINATION AND INOCULATION AGAINST INFLUENZA: ICD-10-CM

## 2020-01-08 PROCEDURE — 99214 OFFICE O/P EST MOD 30 MIN: CPT | Mod: 25 | Performed by: NURSE PRACTITIONER

## 2020-01-08 PROCEDURE — 90662 IIV NO PRSV INCREASED AG IM: CPT | Performed by: NURSE PRACTITIONER

## 2020-01-08 PROCEDURE — G0008 ADMIN INFLUENZA VIRUS VAC: HCPCS | Performed by: NURSE PRACTITIONER

## 2020-01-08 PROCEDURE — 73030 X-RAY EXAM OF SHOULDER: CPT | Mod: RT

## 2020-01-08 NOTE — PROGRESS NOTES
[facility] Initial Evaluation    Subjective:  Micah Kelly is a 64 year old male with a right shoulder condition. Symptoms commenced as a result of: fell on ice, landed on right side with arm in adducted position. Condition occurred in the following environment: in the community. Onset of symptoms: 1/3/2020. Location of symptoms: lateral. Pain level on number scale: 6-9/10. Quality of pain: throbbing. Associated symptoms: weakness, stiffness. Pain frequency (constant/intermittent): constant. Symptoms are exacerbated by: reaching, lying on side, pushing, dressing, washing, carrying. Symptoms are relieved by: ice, ibuprofen. Progression of symptoms since onset (same/better/worse): better. Special tests (x-ray, MRI, CT scan, EMG, bone scan): x-ray. Previous treatment: None. Improvement with previous treatment: NA. General health as reported by patient is good. Pertinent medical history includes: See Epic. Medical allergies: see Epic. Other pertinent surgeries: see Epic. Current medications: See Epic. Occupation: musician/delivery. Patient is (working in normal job without restrictions/working in normal job with restrictions/working in an alternate job/not working due to present treatment problem): working in normal job without restrictions. Primary job tasks: lifting, carrying, prolonged sitting. Barriers at home/work: None reported by patient. Red flags: None reported by patient.    Objective  Posture    Forward Head +   Rounded Shoulders    Scapular Winging      Shoulder AROM (* = pain) Right Left   FL 85* WNL   * WNL   ER (neutral) 60* WNL   IR L4-L5* WNL     Shoulder Strength (* = pain) Right Left   FL 3+/5 5/5   ABD 3+/5 5/5   ER 3+/5 5/5   IR 3+/5 5/5     Special Tests Right Left   Keisha COBURN'Brien     Anterior Aprehension     Posterior Apprehension     Sulcus Sign     AC Crossover     Drop-Arm -    Lift-off Sign     Painful Arc +      Palpation  tenderness:  Right biceps and supraspinatus tendons    Assessment/Plan:    Patient is a 64 year old male with right side shoulder complaints.    Patient has the following significant findings with corresponding treatment plan.                Diagnosis 1:  Right shoulder pain Pain -  manual therapy, self management, education, directional preference exercise and home program  Decreased ROM/flexibility - manual therapy and therapeutic exercise  Decreased strength - therapeutic exercise and therapeutic activities  Decreased proprioception - neuro re-education and therapeutic activities  Impaired muscle performance - neuro re-education  Decreased function - therapeutic activities  Impaired posture - neuro re-education    Previous and current functional limitations:  (See Goal Flow Sheet for this information)    Short term and Long term goals: (See Goal Flow Sheet for this information)     Communication ability:  Patient appears to be able to clearly communicate and understand verbal and written communication and follow directions correctly.  Treatment Explanation - The following has been discussed with the patient:   RX ordered/plan of care  Anticipated outcomes  Possible risks and side effects  This patient would benefit from PT intervention to resume normal activities.   Rehab potential is good.    Frequency:  1 X week, once daily  Duration:  for 4 weeks tapering to 2 X a month over 1 month  Discharge Plan:  Achieve all LTG.  Independent in home treatment program.  Reach maximal therapeutic benefit.    Please refer to the daily flowsheet for treatment today, total treatment time and time spent performing 1:1 timed codes.    Statement Selected

## 2020-01-08 NOTE — PATIENT INSTRUCTIONS
Xray negative for fracture.    You have very limited range of motion and strength of your rotator cuff, I suspect a tear, unsure how significant the tear is.  We discussed to start conservative management with physical therapy.  Continue ice and ibuprofen as needed.  If not seeing improvement in 2-4 weeks I recommend MRI.  If tear is severe may need to see ortho surgeon.    For your claustrophobia we can consider ativan prior to MRI or try for open air MRI ([facility] has one in [place])

## 2020-01-10 ENCOUNTER — THERAPY VISIT (OUTPATIENT)
Dept: PHYSICAL THERAPY | Facility: CLINIC | Age: 65
End: 2020-01-10
Attending: NURSE PRACTITIONER
Payer: COMMERCIAL

## 2020-01-10 DIAGNOSIS — M25.511 ACUTE PAIN OF RIGHT SHOULDER: ICD-10-CM

## 2020-01-10 PROCEDURE — 97161 PT EVAL LOW COMPLEX 20 MIN: CPT | Mod: GP | Performed by: PHYSICAL THERAPIST

## 2020-01-10 PROCEDURE — 97110 THERAPEUTIC EXERCISES: CPT | Mod: GP | Performed by: PHYSICAL THERAPIST

## 2020-01-17 ENCOUNTER — THERAPY VISIT (OUTPATIENT)
Dept: PHYSICAL THERAPY | Facility: CLINIC | Age: 65
End: 2020-01-17
Payer: COMMERCIAL

## 2020-01-17 DIAGNOSIS — M25.511 ACUTE PAIN OF RIGHT SHOULDER: ICD-10-CM

## 2020-01-17 PROCEDURE — 97112 NEUROMUSCULAR REEDUCATION: CPT | Mod: GP | Performed by: PHYSICAL THERAPIST

## 2020-01-17 PROCEDURE — 97110 THERAPEUTIC EXERCISES: CPT | Mod: GP | Performed by: PHYSICAL THERAPIST

## 2020-01-19 DIAGNOSIS — I10 HYPERTENSION GOAL BP (BLOOD PRESSURE) < 150/90: ICD-10-CM

## 2020-01-20 NOTE — TELEPHONE ENCOUNTER
"Requested Prescriptions   Pending Prescriptions Disp Refills     amLODIPine (NORVASC) 2.5 MG tablet [Pharmacy Med Name: AMLODIPINE BESYLATE 2.5 MG TAB] 90 tablet 1     Sig: TAKE 1 TABLET BY MOUTH EVERY DAY  Last Written Prescription Date:  7/18/2019  Last Fill Quantity: 90 tablet,  # refills: 1   Last Office Visit: 1/8/2020   Future Office Visit:            Calcium Channel Blockers Protocol  Passed - 1/19/2020 12:05 PM        Passed - Blood pressure under 140/90 in past 12 months     BP Readings from Last 3 Encounters:   01/08/20 132/82   12/27/19 116/70   12/27/18 116/81           Passed - Recent (12 mo) or future (30 days) visit within the authorizing provider's specialty     Patient has had an office visit with the authorizing provider or a provider within the authorizing providers department within the previous 12 mos or has a future within next 30 days. See \"Patient Info\" tab in inbasket, or \"Choose Columns\" in Meds & Orders section of the refill encounter.            Passed - Medication is active on med list        Passed - Patient is age 18 or older        Passed - Normal serum creatinine on file in past 12 months     Recent Labs   Lab Test 07/10/19  1404   CR 0.87               "

## 2020-01-22 RX ORDER — AMLODIPINE BESYLATE 2.5 MG/1
TABLET ORAL
Qty: 90 TABLET | Refills: 0 | Status: SHIPPED | OUTPATIENT
Start: 2020-01-22 | End: 2020-03-03

## 2020-01-22 NOTE — TELEPHONE ENCOUNTER
Medication is being filled for 1 time refill only due to:  Patient needs to be seen because need annual exam/fasting labs.   Please call to silvestre.  Brynn Victor RN

## 2020-01-24 ENCOUNTER — THERAPY VISIT (OUTPATIENT)
Dept: PHYSICAL THERAPY | Facility: CLINIC | Age: 65
End: 2020-01-24
Payer: COMMERCIAL

## 2020-01-24 DIAGNOSIS — I10 HYPERTENSION GOAL BP (BLOOD PRESSURE) < 150/90: ICD-10-CM

## 2020-01-24 DIAGNOSIS — M25.511 ACUTE PAIN OF RIGHT SHOULDER: ICD-10-CM

## 2020-01-24 PROCEDURE — 97110 THERAPEUTIC EXERCISES: CPT | Mod: GP | Performed by: PHYSICAL THERAPIST

## 2020-01-24 NOTE — TELEPHONE ENCOUNTER
"Requested Prescriptions   Pending Prescriptions Disp Refills     lisinopril (PRINIVIL/ZESTRIL) 20 MG tablet [Pharmacy Med Name: LISINOPRIL 20 MG TABLET]  Last Written Prescription Date:  7/18/2019  Last Fill Quantity: 90 tabs,  # refills: 1   Last office visit: 1/8/2020 with prescribing provider:  Marcia   Future Office Visit:   90 tablet 1     Sig: TAKE 1 TABLET BY MOUTH EVERY DAY       ACE Inhibitors (Including Combos) Protocol Passed - 1/24/2020  1:43 AM        Passed - Blood pressure under 140/90 in past 12 months     BP Readings from Last 3 Encounters:   01/08/20 132/82   12/27/19 116/70   12/27/18 116/81                 Passed - Recent (12 mo) or future (30 days) visit within the authorizing provider's specialty     Patient has had an office visit with the authorizing provider or a provider within the authorizing providers department within the previous 12 mos or has a future within next 30 days. See \"Patient Info\" tab in inbasket, or \"Choose Columns\" in Meds & Orders section of the refill encounter.              Passed - Medication is active on med list        Passed - Patient is age 18 or older        Passed - Normal serum creatinine on file in past 12 months     Recent Labs   Lab Test 07/10/19  1404   CR 0.87             Passed - Normal serum potassium on file in past 12 months     Recent Labs   Lab Test 07/10/19  1404   POTASSIUM 4.4              "

## 2020-01-25 RX ORDER — LISINOPRIL 20 MG/1
TABLET ORAL
Qty: 90 TABLET | Refills: 2 | Status: SHIPPED | OUTPATIENT
Start: 2020-01-25 | End: 2020-03-03

## 2020-01-25 NOTE — TELEPHONE ENCOUNTER
Signed Prescriptions:                        Disp   Refills    lisinopril (PRINIVIL/ZESTRIL) 20 MG tablet 90 tab*2        Sig: TAKE 1 TABLET BY MOUTH EVERY DAY  Authorizing Provider: MARV FREEMAN  Ordering User: SAKINA DUBOIS

## 2020-02-25 PROBLEM — M25.511 ACUTE PAIN OF RIGHT SHOULDER: Status: RESOLVED | Noted: 2020-01-10 | Resolved: 2020-02-25

## 2020-02-25 NOTE — PROGRESS NOTES
Discharge Note    Progress reporting period is from initial evaluation date (please see noted date below) to Jan 24, 2020.  Linked Episodes   Type: Episode: Status: Noted: Resolved: Last update: Updated by:   PHYSICAL THERAPY right shoulder Active 1/10/2020  1/24/2020  1:35 PM Patrick Andre, PT      Comments:       Micah failed to follow up and current status is unknown.  Please see information below for last relevant information on current status.  Patient seen for 3 visits.    SUBJECTIVE  Subjective changes noted by patient:  Patient reports much improved pain symptoms, ROM, strength and overall function. Patient reports improving ability to reach overhead. Patient reports consistency with his HEP.(patient 15' late)  .  Current pain level is 0/10.     Previous pain level was  9/10.   Changes in function:  Yes (See Goal flowsheet attached for changes in current functional level)  Adverse reaction to treatment or activity: None    OBJECTIVE  Changes noted in objective findings: right shoulder AROM WNL throughout but pain during at end range of motion     ASSESSMENT/PLAN  Diagnosis: right shoulder pain   Updated problem list and treatment plan:     STG/LTGs have been met or progress has been made towards goals:  Yes, please see goal flowsheet for most current information  Assessment of Progress: current status is unknown.    Last current status: Pt is progressing as expected   Self Management Plans:  HEP  I have re-evaluated this patient and find that the nature, scope, duration and intensity of the therapy is appropriate for the medical condition of the patient.  Micah continues to require the following intervention to meet STG and LTG's:  HEP.    Recommendations:  Discharge with current home program.  Patient to follow up with MD as needed.    Please refer to the daily flowsheet for treatment today, total treatment time and time spent performing 1:1 timed codes.         No

## 2020-03-03 ENCOUNTER — OFFICE VISIT (OUTPATIENT)
Dept: FAMILY MEDICINE | Facility: CLINIC | Age: 65
End: 2020-03-03
Payer: COMMERCIAL

## 2020-03-03 VITALS
WEIGHT: 147 LBS | RESPIRATION RATE: 16 BRPM | TEMPERATURE: 98.1 F | HEART RATE: 90 BPM | BODY MASS INDEX: 26.05 KG/M2 | HEIGHT: 63 IN | OXYGEN SATURATION: 95 % | DIASTOLIC BLOOD PRESSURE: 96 MMHG | SYSTOLIC BLOOD PRESSURE: 148 MMHG

## 2020-03-03 DIAGNOSIS — I10 HYPERTENSION GOAL BP (BLOOD PRESSURE) < 150/90: ICD-10-CM

## 2020-03-03 DIAGNOSIS — Z00.00 ENCOUNTER FOR MEDICARE ANNUAL WELLNESS EXAM: Primary | ICD-10-CM

## 2020-03-03 DIAGNOSIS — Z71.89 ADVANCED DIRECTIVES, COUNSELING/DISCUSSION: ICD-10-CM

## 2020-03-03 DIAGNOSIS — Z13.6 CARDIOVASCULAR SCREENING; LDL GOAL LESS THAN 160: ICD-10-CM

## 2020-03-03 DIAGNOSIS — S49.91XD RIGHT SHOULDER INJURY, SUBSEQUENT ENCOUNTER: ICD-10-CM

## 2020-03-03 PROCEDURE — 36415 COLL VENOUS BLD VENIPUNCTURE: CPT | Performed by: FAMILY MEDICINE

## 2020-03-03 PROCEDURE — 80061 LIPID PANEL: CPT | Performed by: FAMILY MEDICINE

## 2020-03-03 PROCEDURE — G0402 INITIAL PREVENTIVE EXAM: HCPCS | Performed by: FAMILY MEDICINE

## 2020-03-03 PROCEDURE — 82043 UR ALBUMIN QUANTITATIVE: CPT | Performed by: FAMILY MEDICINE

## 2020-03-03 PROCEDURE — 99213 OFFICE O/P EST LOW 20 MIN: CPT | Mod: 25 | Performed by: FAMILY MEDICINE

## 2020-03-03 RX ORDER — AMLODIPINE BESYLATE 2.5 MG/1
2.5 TABLET ORAL DAILY
Qty: 90 TABLET | Refills: 3 | Status: SHIPPED | OUTPATIENT
Start: 2020-03-03 | End: 2021-04-15

## 2020-03-03 RX ORDER — LISINOPRIL 20 MG/1
20 TABLET ORAL DAILY
Qty: 90 TABLET | Refills: 3 | Status: SHIPPED | OUTPATIENT
Start: 2020-03-03 | End: 2021-03-22

## 2020-03-03 ASSESSMENT — ENCOUNTER SYMPTOMS
SHORTNESS OF BREATH: 0
CONSTIPATION: 0
HEADACHES: 0
DIZZINESS: 0
DIARRHEA: 0
MYALGIAS: 1
ARTHRALGIAS: 0
HEMATURIA: 0
PALPITATIONS: 0
HEARTBURN: 0
ABDOMINAL PAIN: 0
WEAKNESS: 1
FREQUENCY: 1
DYSURIA: 0
HEMATOCHEZIA: 0
PARESTHESIAS: 0
NERVOUS/ANXIOUS: 0
FEVER: 0
JOINT SWELLING: 0
COUGH: 0
SORE THROAT: 0
NAUSEA: 0
EYE PAIN: 0
CHILLS: 0

## 2020-03-03 ASSESSMENT — MIFFLIN-ST. JEOR: SCORE: 1342.95

## 2020-03-03 ASSESSMENT — ACTIVITIES OF DAILY LIVING (ADL): CURRENT_FUNCTION: NO ASSISTANCE NEEDED

## 2020-03-03 NOTE — LETTER
[facility]  [address] [place] [id number] [phone]        March 6, 2020    Micah Kelly                                                                                                                     [address] [facility] [address]        Dear Micah,    It was a pleasure to see you, as always! Thank you for getting lab work done!   Your microalbumen is normal, which is great news. This means you do not have protein in the urine, and that your kidneys are currently functioning well. Keeping blood pressure and blood fats low is the best way to preserve your kidney function over your lifetime.   Your total cholesterol is a little higher than last year, but overall everything looks good on your cholesterol panel!   Desired or goal levels are:   CHOLESTEROL: Desirable is less than 200.   HDL (Good Cholesterol): Desirable is greater than 40 in men and greater than 50 in women.   LDL (Bad Cholesterol): Desirable is less than 130.   TRIGLYCERIDES: Desirable is less than 150.   As you may know, abnormal cholesterol is one factor that increases your risk for heart disease and stroke. You can improve your cholesterol by controlling the amount and type of fat you eat and by increasing your daily activity level.   Here are some ways to improve your nutrition (adapted from the American Academy of [employer] handout):   Eat less fat (especially butter, Crisco and other saturated fats)   Buy lean cuts of meat; reduce your portions of red meat or substitute poultry or fish   Use skim milk and low-fat dairy products eat no more than 4 egg yolks per week   Avoid fried or fast foods that are high in fat eat more fruits and vegetables.     Results for orders placed or performed in visit on 03/03/20   Lipid panel reflex to direct LDL Fasting     Status: Abnormal   Result Value Ref Range    Cholesterol 200 (H) <200 mg/dL    Triglycerides 128 <150 mg/dL    HDL Cholesterol 48 >39  mg/dL    LDL Cholesterol Calculated 126 (H) <100 mg/dL    Non HDL Cholesterol 152 (H) <130 mg/dL   Albumin Random Urine Quantitative with Creat Ratio     Status: None   Result Value Ref Range    Creatinine Urine 27 mg/dL    Albumin Urine mg/L <5 mg/L    Albumin Urine mg/g Cr Unable to calculate due to low value 0 - 17 mg/g Cr     If you have any questions, please contact the clinic or schedule an appointment with me, thank you! Otherwise I'll see you again next year!       Sincerely,       Gali Kennedy MD.

## 2020-03-03 NOTE — PROGRESS NOTES
"SUBJECTIVE:   Micah Kelly is a 64 year old male who presents for Preventive Visit.    Right shoulder injury  2 months ago he landed on outside right arm/shoulder when slipped on ice while tubing, has been doing physical therapy but continues to have pain without limited range of motion. He's noticed popping sound of arm when he extends his arm.  Are you in the first 12 months of your Medicare coverage?  Yes,  Visual Acuity:  Right Eye: 10/12.5   Left Eye: 10/12.5  Both Eyes: 10/10    Healthy Habits:     In general, how would you rate your overall health?  Good    Frequency of exercise:  2-3 days/week    Duration of exercise:  30-45 minutes    Do you usually eat at least 4 servings of fruit and vegetables a day, include whole grains    & fiber and avoid regularly eating high fat or \"junk\" foods?  Yes    Taking medications regularly:  Yes    Barriers to taking medications:  None    Medication side effects:  Not applicable    Ability to successfully perform activities of daily living:  No assistance needed    Home Safety:  No safety concerns identified    Hearing Impairment:  Difficulty understanding soft or whispered speech    In the past 6 months, have you been bothered by leaking of urine?  No    In general, how would you rate your overall mental or emotional health?  Good      PHQ-2 Total Score: 0    Additional concerns today:  No    Do you feel safe in your environment? Yes    Have you ever done Advance Care Planning? (For example, a Health Directive, POLST, or a discussion with a medical provider or your loved ones about your wishes): No, advance care planning information given to patient to review.  Advanced care planning was discussed at today's visit.      Fall risk  Fallen 2 or more times in the past year?: No  Any fall with injury in the past year?: No    Cognitive Screening   1) Repeat 3 items (Leader, Season, Table)    2) Clock draw: NORMAL  3) 3 item recall: Recalls 3 objects  Results: 3 items " recalled: COGNITIVE IMPAIRMENT LESS LIKELY    Mini-CogTM Copyright GUILLAUME Ramirez. Licensed by the author for use in [facility]; reprinted with permission (cindy@[facility]). All rights reserved.      Do you have sleep apnea, excessive snoring or daytime drowsiness?: no    Reviewed and updated as needed this visit by clinical staff  Tobacco  Allergies  Meds  Med Hx  Surg Hx  Fam Hx  Soc Hx        Reviewed and updated as needed this visit by Provider        Social History     Tobacco Use     Smoking status: Never Smoker     Smokeless tobacco: Never Used   Substance Use Topics     Alcohol use: Yes     Alcohol/week: 1.0 standard drinks     Types: 1 Standard drinks or equivalent per week     Comment: ocassional drink     If you drink alcohol do you typically have >3 drinks per day or >7 drinks per week? No    Alcohol Use 3/3/2020   Prescreen: >3 drinks/day or >7 drinks/week? No   Prescreen: >3 drinks/day or >7 drinks/week? -   No flowsheet data found.        Hypertension Follow-up      Do you check your blood pressure regularly outside of the clinic? No     Are you following a low salt diet? Yes    Are your blood pressures ever more than 140 on the top number (systolic) OR more   than 90 on the bottom number (diastolic), for example 140/90? No      Current providers sharing in care for this patient include:     Patient Care Team:  Gali Kennedy MD as PCP - General (Family Practice)  Jennifer Croft APRN CNP as Assigned PCP    The following health maintenance items are reviewed in Epic and correct as of today:  Health Maintenance   Topic Date Due     ADVANCE CARE PLANNING  1955     ZOSTER IMMUNIZATION (2 of 3) 10/06/2017     FALL RISK ASSESSMENT  06/02/2019     PNEUMOCOCCAL IMMUNIZATION 65+ LOW/MEDIUM RISK (1 of 2 - PCV13) 06/02/2019     AORTIC ANEURYSM SCREENING (SYSTEM ASSIGNED)  06/02/2019     MEDICARE ANNUAL WELLNESS VISIT  08/03/2019     LIPID  08/03/2019     MICROALBUMIN  08/03/2019      PHQ-2  01/01/2020     BMP  07/10/2020     DTAP/TDAP/TD IMMUNIZATION (2 - Td) 02/02/2026     COLONOSCOPY  12/14/2028     HEPATITIS C SCREENING  Completed     HIV SCREENING  Completed     INFLUENZA VACCINE  Completed     IPV IMMUNIZATION  Aged Out     MENINGITIS IMMUNIZATION  Aged Out     BP Readings from Last 3 Encounters:   03/03/20 (!) 148/96   01/08/20 132/82   12/27/19 116/70    Wt Readings from Last 3 Encounters:   03/03/20 66.7 kg (147 lb)   01/08/20 67.6 kg (149 lb)   12/27/19 68 kg (150 lb)                  Patient Active Problem List   Diagnosis     Hypertension goal BP (blood pressure) < 150/90     Right shoulder injury, subsequent encounter     Past Surgical History:   Procedure Laterality Date     COLONOSCOPY N/A 12/14/2018    Procedure: COLONOSCOPY;  Surgeon: Rere Calvo MD;  Location:  GI     VASECTOMY  1986    uncomplicated       Social History     Tobacco Use     Smoking status: Never Smoker     Smokeless tobacco: Never Used   Substance Use Topics     Alcohol use: Yes     Alcohol/week: 1.0 standard drinks     Types: 1 Standard drinks or equivalent per week     Comment: ocassional drink     Family History   Problem Relation Age of Onset     Colon Cancer Mother 88     Peptic Ulcer Disease Mother      Hypertension Father 98        d age 98 of stroke     Alzheimer Disease Father      Hyperlipidemia Father      Hypothyroidism Father      Kidney Disease Father          Current Outpatient Medications   Medication Sig Dispense Refill     amLODIPine (NORVASC) 2.5 MG tablet Take 1 tablet (2.5 mg) by mouth daily 90 tablet 3     Ascorbic Acid (VITAMIN C PO) Take 1,000 mg by mouth daily       ASPIRIN PO Take 81 mg by mouth daily       folic acid-vit B6-vit B12 (FOLGARD) 0.8-10-0.115 MG TABS per tablet Take by mouth daily       lisinopril (ZESTRIL) 20 MG tablet Take 1 tablet (20 mg) by mouth daily 90 tablet 3     Omega-3 Fatty Acids (FISH OIL PO)        VITAMIN D, CHOLECALCIFEROL, PO Take 400 Units by  "mouth daily       No Known Allergies  Recent Labs   Lab Test 07/10/19  1404 08/03/18  1215 06/19/18  1248 03/06/17  1435 02/02/16  1220   LDL  --  111*  --  106* 132*   HDL  --  42  --  41 53   TRIG  --  130  --  164* 66   ALT  --   --   --   --  38   CR 0.87  --  0.78 0.83 0.82   GFRESTIMATED >90  --  >90 >90  Non  GFR Calc   >90  Non  GFR Calc     GFRESTBLACK >90  --  >90 >90   GFR Calc   >90   GFR Calc     POTASSIUM 4.4  --  4.6 4.5 4.2      Review of Systems   Constitutional: Negative for chills and fever.   HENT: Negative for congestion, ear pain, hearing loss and sore throat.    Eyes: Negative for pain and visual disturbance.   Respiratory: Negative for cough and shortness of breath.    Cardiovascular: Negative for chest pain, palpitations and peripheral edema.   Gastrointestinal: Negative for abdominal pain, constipation, diarrhea, heartburn, hematochezia and nausea.   Genitourinary: Positive for frequency. Negative for discharge, dysuria, genital sores, hematuria, impotence and urgency.   Musculoskeletal: Positive for myalgias. Negative for arthralgias and joint swelling.   Skin: Negative for rash.   Neurological: Positive for weakness. Negative for dizziness, headaches and paresthesias.   Psychiatric/Behavioral: Negative for mood changes. The patient is not nervous/anxious.        OBJECTIVE:   BP (!) 148/96 (BP Location: Right arm, Patient Position: Sitting, Cuff Size: Adult Regular)   Pulse 90   Temp 98.1  F (36.7  C) (Tympanic)   Resp 16   Ht 1.594 m (5' 2.75\")   Wt 66.7 kg (147 lb)   SpO2 95%   BMI 26.25 kg/m   Estimated body mass index is 26.25 kg/m  as calculated from the following:    Height as of this encounter: 1.594 m (5' 2.75\").    Weight as of this encounter: 66.7 kg (147 lb).  Physical Exam  GENERAL: healthy, alert and no distress  NECK: no adenopathy, no asymmetry, masses, or scars and thyroid normal to palpation  RESP: " "lungs clear to auscultation - no rales, rhonchi or wheezes  CV: regular rate and rhythm, normal S1 S2, no S3 or S4, no murmur, click or rub, no peripheral edema and peripheral pulses strong  ABDOMEN: soft, nontender, no hepatosplenomegaly, no masses and bowel sounds normal  MS: Shoulder exam - symmetrical bilaterally, right shoulder pain with extension and abduction with full rom retained, audible \"pop\" sound noted with extension > 90 degrees NEURO: Normal strength and tone, mentation intact and speech normal  BACK: no CVA tenderness, no paralumbar tenderness  PSYCH: mentation appears normal, affect normal/bright      ASSESSMENT / PLAN:   1. Encounter for Medicare annual wellness exam  routine    2. Hypertension goal BP (blood pressure) < 150/90  BP Readings from Last 3 Encounters:   03/03/20 (!) 148/96   01/08/20 132/82   12/27/19 116/70    aggravated by pain, no med changes today, recheck  - Albumin Random Urine Quantitative with Creat Ratio  - lisinopril (ZESTRIL) 20 MG tablet; Take 1 tablet (20 mg) by mouth daily  Dispense: 90 tablet; Refill: 3  - amLODIPine (NORVASC) 2.5 MG tablet; Take 1 tablet (2.5 mg) by mouth daily  Dispense: 90 tablet; Refill: 3    3. Right shoulder injury, subsequent encounter  Concern for labrum tear vs rotator cuff tear vs stretched cartilage with ongoing pain at 2 months out despite conservative treatment. I do recommend orthopedic referral and MRI for diagnosis, he will consider this and let me know    4. CARDIOVASCULAR SCREENING; LDL GOAL LESS THAN 160  LDL Cholesterol Calculated   Date Value Ref Range Status   08/03/2018 111 (H) <100 mg/dL Final     Comment:     Above desirable:  100-129 mg/dl  Borderline High:  130-159 mg/dL  High:             160-189 mg/dL  Very high:       >189 mg/dl        - Lipid panel reflex to direct LDL Fasting    5. Advanced directives, counseling/discussion  Paperwork provided today  - ADVANCE CARE PLANNING DISCUSS DOCUMENTED IN MEDICAL " "RECORD    COUNSELING:  Reviewed preventive health counseling, as reflected in patient instructions    Estimated body mass index is 26.25 kg/m  as calculated from the following:    Height as of this encounter: 1.594 m (5' 2.75\").    Weight as of this encounter: 66.7 kg (147 lb).       reports that he has never smoked. He has never used smokeless tobacco.    Appropriate preventive services were discussed with this patient, including applicable screening as appropriate for cardiovascular disease, diabetes, osteopenia/osteoporosis, and glaucoma.  As appropriate for age/gender, discussed screening for colorectal cancer, prostate cancer, breast cancer, and cervical cancer. Checklist reviewing preventive services available has been given to the patient.    Reviewed patients plan of care and provided an AVS. The Intermediate Care Plan ( asthma action plan, low back pain action plan, and migraine action plan) for Micah meets the Care Plan requirement. This Care Plan has been established and reviewed with the Patient.    Counseling Resources:  ATP IV Guidelines  Pooled Cohorts Equation Calculator  Breast Cancer Risk Calculator  FRAX Risk Assessment  ICSI Preventive Guidelines  Dietary Guidelines for Americans, 2010  [employer]'s MyPlate  ASA Prophylaxis  Lung CA Screening    Gali Kennedy MD  [facility]    Identified Health Risks:  "

## 2020-03-03 NOTE — PATIENT INSTRUCTIONS
Every night: ice for 20 minutes, take 3-4 ibuprofen (600 - 800 mg)    Patient Education   Personalized Prevention Plan  You are due for the preventive services outlined below.  Your care team is available to assist you in scheduling these services.  If you have already completed any of these items, please share that information with your care team to update in your medical record.  Health Maintenance Due   Topic Date Due     Discuss Advance Care Planning  1955     Zoster (Shingles) Vaccine (2 of 3) 10/06/2017     FALL RISK ASSESSMENT  06/02/2019     Pneumococcal Vaccine (1 of 2 - PCV13) 06/02/2019     AORTIC ANEURYSM SCREENING (SYSTEM ASSIGNED)  06/02/2019     Annual Wellness Visit  08/03/2019     Cholesterol Lab  08/03/2019     Kidney Microalbumin Urine Test  08/03/2019     PHQ-2  01/01/2020        Patient Education     Shoulder Arthroscopy: Conditions Treated  You will be given instructions for how to prepare for your arthroscopic surgery and when you should arrive at the hospital or surgery center. Arthroscopy uses a thin tube with a light and camera on the end to look into your joint area. Other thin tubes are inserted through small incisions to guide surgical instruments. Just before surgery, a healthcare provider will talk to you about the anesthesia that will be used to keep you free of pain during surgery. Be sure to tell your surgeon about any medicines you take including over-the-counter medicines, vitamins, and supplements. You may be asked by several people to confirm which shoulder is being operated on. This is for your safety. Arthroscopy can be used to both diagnose and treat your shoulder problem during the same surgery. Below are common shoulder problems and how they are treated during arthroscopy.       Impingement    Repeated overhead movements can inflame your rotator cuff and bursa. A bone spur may also form. This causes pain and problems with certain arm movements. Impingement is also  called bursitis or tendinitis.    During surgery, an inflamed bursa may be removed. Bone may be trimmed. And the coracoacromial ligament may be detached. These steps make more room, relieving pressure and allowing the arm to move more freely.      Torn rotator cuff    A rotator cuff can tear due to a sudden injury or from overuse. This can cause pain, weakness, and loss of normal shoulder movement.    During surgery, torn rotator cuff tendons may be trimmed. The tendons are then reattached to the humerus. This is done with stitches, anchors, or surgical tacks.      Stretched capsule    A stretched capsule will remain loose. A loose capsule can t hold the joint firmly in place. The bones of the joint may feel like they move too much and the shoulder can dislocate.    During arthroscopy, a stretched capsule is folded over itself and sutured in place. (This is done from inside the capsule.) This tightens the capsule, helping make the shoulder joint more stable.      Torn labrum    The shoulder is a ball and socket joint.  The labrum normally supports and cushions the shoulder joint. In the case of a torn labrum, the socket that the ball (the upper end of your arm) fits into is not very deep. The shallow socket increases the potential for instability.    The labrum may tear off the rim of the glenoid. This can cause the joint to catch or feel like it s slipping out of place. The shoulder may even dislocate.    A torn labrum is repaired by reattaching it to the glenoid. This is often done with special anchors put into the glenoid bone. Sutures attached to the anchors are tied to hold the labrum in place. The joint is then more stable.         Arthritis and loose bodies    Arthritis is damage to joint cartilage with age and use. Injury or disease can also cause it. Wear and tear may also lead to loose bodies (pieces of bone or cartilage) or bone spurs in a joint.    During surgery, bone spurs are removed. Rough parts of  the joint are smoothed. Any loose body is removed from the joint. Bone may also be scraped or shaved to promote new cartilage growth.    Date Last Reviewed: 10/1/2017    7190-9739 [employer]. [address] [place] [address], [place] [address]. All rights reserved. This information is not intended as a substitute for professional medical care. Always follow your healthcare professional's instructions.           Preventive Health Recommendations:     See your health care provider every year to    Review health changes.     Discuss preventive care.      Review your medicines if your doctor has prescribed any.      Talk with your health care provider about whether you should have a test to screen for prostate cancer (PSA).    Every 3 years, have a diabetes test (fasting glucose). If you are at risk for diabetes, you should have this test more often.    Every 5 years, have a cholesterol test. Have this test more often if you are at risk for high cholesterol or heart disease.     Every 10 years, have a colonoscopy. Or, have a yearly FIT test (stool test). These exams will check for colon cancer.    Talk to with your health care provider about screening for Abdominal Aortic Aneurysm if you have a family history of AAA or have a history of smoking.    Shots:     Get a flu shot each year.     Get a tetanus shot every 10 years.     Talk to your doctor about your pneumonia vaccines. There are now two you should receive - Pneumovax (PPSV 23) and Prevnar (PCV 13).     Talk to your pharmacist about a shingles vaccine.     Talk to your doctor about the hepatitis B vaccine.  Nutrition:     Eat at least 5 servings of fruits and vegetables each day.     Eat whole-grain bread, whole-wheat pasta and brown rice instead of white grains and rice.     Get adequate Calcium and Vitamin D.   Lifestyle    Exercise for at least 150 minutes a week (30 minutes a day, 5 days a week). This will help you control your weight and prevent  disease.     Limit alcohol to one drink per day.     No smoking.     Wear sunscreen to prevent skin cancer.    See your dentist every six months for an exam and cleaning.    See your eye doctor every 1 to 2 years to screen for conditions such as glaucoma, macular degeneration, cataracts, etc.    Personalized Prevention Plan  You are due for the preventive services outlined below.  Your care team is available to assist you in scheduling these services.  If you have already completed any of these items, please share that information with your care team to update in your medical record.  Health Maintenance Due   Topic Date Due     Discuss Advance Care Planning  1955     Zoster (Shingles) Vaccine (2 of 3) 10/06/2017     FALL RISK ASSESSMENT  06/02/2019     Pneumococcal Vaccine (1 of 2 - PCV13) 06/02/2019     AORTIC ANEURYSM SCREENING (SYSTEM ASSIGNED)  06/02/2019     Annual Wellness Visit  08/03/2019     Cholesterol Lab  08/03/2019     Kidney Microalbumin Urine Test  08/03/2019     PHQ-2  01/01/2020

## 2020-03-04 LAB
CHOLEST SERPL-MCNC: 200 MG/DL
CREAT UR-MCNC: 27 MG/DL
HDLC SERPL-MCNC: 48 MG/DL
LDLC SERPL CALC-MCNC: 126 MG/DL
MICROALBUMIN UR-MCNC: <5 MG/L
MICROALBUMIN/CREAT UR: NORMAL MG/G CR (ref 0–17)
NONHDLC SERPL-MCNC: 152 MG/DL
TRIGL SERPL-MCNC: 128 MG/DL

## 2020-03-06 NOTE — RESULT ENCOUNTER NOTE
It was a pleasure to see you, as always! Thank you for getting lab work done!    Your microalbumen is normal, which is great news. This means you do not have protein in the urine, and that your kidneys are currently functioning well. Keeping blood pressure and blood fats low is the best way to preserve your kidney function over your lifetime.     Your total cholesterol is a little higher than last year, but overall everything looks good on your cholesterol panel!    Desired or goal levels are:  CHOLESTEROL: Desirable is less than 200.  HDL (Good Cholesterol): Desirable is greater than 40 in men and greater than 50 in women.  LDL (Bad Cholesterol): Desirable is less than 130.  TRIGLYCERIDES: Desirable is less than 150.    As you may know, abnormal cholesterol is one factor that increases your risk for heart disease and stroke. You can improve your cholesterol by controlling the amount and type of fat you eat and by increasing your daily activity level.    Here are some ways to improve your nutrition (adapted from the American Academy of [employer] handout):  Eat less fat (especially butter, Crisco and other saturated fats)  Buy lean cuts of meat; reduce your portions of red meat or substitute poultry or fish  Use skim milk and low-fat dairy products  Eat no more than 4 egg yolks per week  Avoid fried or fast foods that are high in fat  Eat more fruits and vegetables    If you have any questions, please contact the clinic or schedule an appointment with me, thank you! Otherwise I'll see you again next year!    Sincerely,  Dr. Gali Kennedy MD  3/6/2020

## 2020-11-05 ENCOUNTER — TRANSFERRED RECORDS (OUTPATIENT)
Dept: HEALTH INFORMATION MANAGEMENT | Facility: CLINIC | Age: 65
End: 2020-11-05

## 2020-12-10 ENCOUNTER — OFFICE VISIT (OUTPATIENT)
Dept: FAMILY MEDICINE | Facility: CLINIC | Age: 65
End: 2020-12-10
Payer: COMMERCIAL

## 2020-12-10 VITALS
BODY MASS INDEX: 26.43 KG/M2 | DIASTOLIC BLOOD PRESSURE: 82 MMHG | SYSTOLIC BLOOD PRESSURE: 126 MMHG | WEIGHT: 148 LBS | HEART RATE: 86 BPM | OXYGEN SATURATION: 95 % | TEMPERATURE: 98.5 F | RESPIRATION RATE: 19 BRPM

## 2020-12-10 DIAGNOSIS — Z01.818 PREOP GENERAL PHYSICAL EXAM: Primary | ICD-10-CM

## 2020-12-10 DIAGNOSIS — S46.001D INJURY OF RIGHT ROTATOR CUFF, SUBSEQUENT ENCOUNTER: ICD-10-CM

## 2020-12-10 DIAGNOSIS — Z13.6 CARDIOVASCULAR SCREENING; LDL GOAL LESS THAN 160: ICD-10-CM

## 2020-12-10 DIAGNOSIS — I10 HYPERTENSION GOAL BP (BLOOD PRESSURE) < 150/90: ICD-10-CM

## 2020-12-10 LAB
ANION GAP SERPL CALCULATED.3IONS-SCNC: 4 MMOL/L (ref 3–14)
BASOPHILS # BLD AUTO: 0 10E9/L (ref 0–0.2)
BASOPHILS NFR BLD AUTO: 0.5 %
BUN SERPL-MCNC: 20 MG/DL (ref 7–30)
CALCIUM SERPL-MCNC: 9.2 MG/DL (ref 8.5–10.1)
CHLORIDE SERPL-SCNC: 106 MMOL/L (ref 94–109)
CHOLEST SERPL-MCNC: 195 MG/DL
CO2 SERPL-SCNC: 26 MMOL/L (ref 20–32)
CREAT SERPL-MCNC: 0.74 MG/DL (ref 0.66–1.25)
CREAT UR-MCNC: 18 MG/DL
DIFFERENTIAL METHOD BLD: NORMAL
EOSINOPHIL # BLD AUTO: 0.4 10E9/L (ref 0–0.7)
EOSINOPHIL NFR BLD AUTO: 5 %
ERYTHROCYTE [DISTWIDTH] IN BLOOD BY AUTOMATED COUNT: 12.4 % (ref 10–15)
GFR SERPL CREATININE-BSD FRML MDRD: >90 ML/MIN/{1.73_M2}
GLUCOSE SERPL-MCNC: 97 MG/DL (ref 70–99)
HCT VFR BLD AUTO: 45.9 % (ref 40–53)
HDLC SERPL-MCNC: 40 MG/DL
HGB BLD-MCNC: 15.4 G/DL (ref 13.3–17.7)
LDLC SERPL CALC-MCNC: 121 MG/DL
LYMPHOCYTES # BLD AUTO: 2.1 10E9/L (ref 0.8–5.3)
LYMPHOCYTES NFR BLD AUTO: 28 %
MCH RBC QN AUTO: 30.7 PG (ref 26.5–33)
MCHC RBC AUTO-ENTMCNC: 33.6 G/DL (ref 31.5–36.5)
MCV RBC AUTO: 91 FL (ref 78–100)
MICROALBUMIN UR-MCNC: <5 MG/L
MICROALBUMIN/CREAT UR: NORMAL MG/G CR (ref 0–17)
MONOCYTES # BLD AUTO: 0.9 10E9/L (ref 0–1.3)
MONOCYTES NFR BLD AUTO: 12.3 %
NEUTROPHILS # BLD AUTO: 4.1 10E9/L (ref 1.6–8.3)
NEUTROPHILS NFR BLD AUTO: 54.2 %
NONHDLC SERPL-MCNC: 155 MG/DL
PLATELET # BLD AUTO: 213 10E9/L (ref 150–450)
POTASSIUM SERPL-SCNC: 4.2 MMOL/L (ref 3.4–5.3)
RBC # BLD AUTO: 5.02 10E12/L (ref 4.4–5.9)
SODIUM SERPL-SCNC: 136 MMOL/L (ref 133–144)
TRIGL SERPL-MCNC: 172 MG/DL
WBC # BLD AUTO: 7.6 10E9/L (ref 4–11)

## 2020-12-10 PROCEDURE — 93000 ELECTROCARDIOGRAM COMPLETE: CPT | Performed by: FAMILY MEDICINE

## 2020-12-10 PROCEDURE — 85025 COMPLETE CBC W/AUTO DIFF WBC: CPT | Performed by: FAMILY MEDICINE

## 2020-12-10 PROCEDURE — 36415 COLL VENOUS BLD VENIPUNCTURE: CPT | Performed by: FAMILY MEDICINE

## 2020-12-10 PROCEDURE — 99214 OFFICE O/P EST MOD 30 MIN: CPT | Performed by: FAMILY MEDICINE

## 2020-12-10 PROCEDURE — 80048 BASIC METABOLIC PNL TOTAL CA: CPT | Performed by: FAMILY MEDICINE

## 2020-12-10 PROCEDURE — 80061 LIPID PANEL: CPT | Performed by: FAMILY MEDICINE

## 2020-12-10 PROCEDURE — 82043 UR ALBUMIN QUANTITATIVE: CPT | Performed by: FAMILY MEDICINE

## 2020-12-10 NOTE — RESULT ENCOUNTER NOTE
Patient was seen today in clinic.  I discussed results in clinic, please see clinic progress note.    Gali Kennedy MD 12/10/2020

## 2020-12-10 NOTE — PATIENT INSTRUCTIONS
"Results for orders placed or performed in visit on 12/10/20   CBC with platelets differential     Status: None   Result Value Ref Range    WBC 7.6 4.0 - 11.0 10e9/L    RBC Count 5.02 4.4 - 5.9 10e12/L    Hemoglobin 15.4 13.3 - 17.7 g/dL    Hematocrit 45.9 40.0 - 53.0 %    MCV 91 78 - 100 fl    MCH 30.7 26.5 - 33.0 pg    MCHC 33.6 31.5 - 36.5 g/dL    RDW 12.4 10.0 - 15.0 %    Platelet Count 213 150 - 450 10e9/L    Diff Method Automated Method     % Neutrophils 54.2 %    % Lymphocytes 28.0 %    % Monocytes 12.3 %    % Eosinophils 5.0 %    % Basophils 0.5 %    Absolute Neutrophil 4.1 1.6 - 8.3 10e9/L    Absolute Lymphocytes 2.1 0.8 - 5.3 10e9/L    Absolute Monocytes 0.9 0.0 - 1.3 10e9/L    Absolute Eosinophils 0.4 0.0 - 0.7 10e9/L    Absolute Basophils 0.0 0.0 - 0.2 10e9/L      Preparing for Your Surgery  Getting started  A surgery nurse will call you to review your health history and instructions. They will give you an arrival time based on your scheduled surgery time.  Please be ready to share the following:    Your doctor's clinic name and phone number    Your medical, surgical and anesthesia history    A list of allergies and sensitivities    A list of medicines, including herbal treatments and over-the-counter drugs    Whether the patient has a legal guardian (ask how to send us the papers in advance)  If your child is having surgery, please ask for a copy of Preparing for Your Child's Surgery.    Preparing for surgery    Within 30 days of surgery: Have an exam at your family clinic (primary care clinic), or go to a pre-operative clinic. This exam is called a \"History and Physical,\" or H&P.    At your H&P exam, talk to your care team about all medicines you take. If you need to stop any medicines before surgery, ask when to start taking them again.  ? We do this for your safety. Many medicines can make you bleed too much during surgery. Some change how well surgery (anesthesia) drugs work.    Call your insurance " company to see what it will and won't pay for. Ask if they need to pre-approve the surgery. (If no insurance, call [phone].)    Call your surgeon's clinic if there's any change in your health. This includes signs of a cold or flu (sore throat, runny nose, cough, rash, fever). It also includes a scrape or scratch near the surgery site.    If you have questions on the day of surgery, call your surgery center.  Eating and drinking guidelines  For your safety: Unless your surgeon tells you otherwise, follow the guidelines below.    Eat and drink as usual until 8 hours before surgery. After that, no food or milk.    Drink clear liquids until 2 hours before surgery. These are liquids you can see through, like water, Gatorade and Propel Water. You may also have black coffee and tea (no cream or milk).    Nothing by mouth within 2 hours of surgery. This includes gum, candy and breath mints.    Stop alcohol the midnight before surgery.    If your family clinic tells you to take medicine on the morning of surgery, it's okay to take it with a sip of water.  Preventing infection    Shower or bathe the night before and morning of your surgery. Follow the instructions your clinic gave you. (If no instructions, use regular soap.)    Don't shave or clip hair near your surgery site. This can lead to skin infection.    Don't smoke the morning of surgery. Smoking increases the risk of infection. You may chew nicotine gum up to 2 hours before surgery. A nicotine patch is okay.  ? Note: Some surgeries require you to completely quit smoking and nicotine. Check with your surgeon.    Your care team will make every effort to keep you safe from infection. We will:  ? Clean our hands often with soap and water (or an alcohol-based hand rub).  ? Clean the skin at your surgery site with a special soap that kills germs. We'll also remove hair from the site as needed.  ? Wear special hair covers, masks, gowns and gloves during  surgery.  ? Give antibiotic medicine, if prescribed. Not all surgeries need antibiotics.  What to bring on the day of surgery    Photo ID and insurance card    Copy of your health care directive, if you have one    Glasses and hearing aides (bring cases)  ? You can't wear contacts during surgery    Inhaler and eye drops, if you use them (tell us about these when you arrive)    CPAP machine or breathing device, if you use them    A few personal items, if spending the night    If you have . . .  ? A pacemaker or ICD (cardiac defibrillator): Bring the ID card.  ? An implanted stimulator: Bring the remote control.  ? A legal guardian: Bring a copy of the certified (court-stamped) guardianship papers.  Please remove any jewelry, including body piercings. Leave jewelry and other valuables at home.  If you're going home the day of surgery  Important: If you don't follow the rules below, we must cancel your surgery.     Arrange for someone to drive you home after surgery. You may not drive, take a taxi or take public transportation by yourself (unless you'll have local anesthesia only).    Arrange for a responsible adult to stay with you overnight. If you don't, we may keep you in the hospital overnight, and you may need to pay the costs yourself.  Questions?   If you have any questions for your care team, list them here: _________________________________________________________________________________________________________________________________________________________________________________________________________________________________________________________________________________________________________________________  For informational purposes only. Not to replace the advice of your health care provider. Copyright   6465-4168 [facility]. All rights reserved. Clinically reviewed by Rosanna Sifuentes MD. [employer] [id number] - REV 07/19.

## 2020-12-10 NOTE — PROGRESS NOTES
[facility] [address] [place] [address]  [phone]  Dept: [phone]    PRE-OP EVALUATION:  Today's date: 12/10/2020    Micah Kelly (: 1955) presents for pre-operative evaluation assessment as requested by Dr. Cortes.  He requires evaluation and anesthesia risk assessment prior to undergoing surgery/procedure for treatment of RIGHT rotator cuff surgery .    Fax number for surgical facility: [facility]  Primary Physician: Gali Kennedy  Type of Anesthesia Anticipated: General    Preop Questionnnaire:  Pre-op Questionnaire 12/10/2020   1. Have you ever had a heart attack or stroke? No   2. Have you ever had surgery on your heart or blood vessels, such as a stent placement, a coronary artery bypass, or surgery on an artery in your head, neck, heart, or legs? No   3. Do you have chest pain with activity? No   4. Do you have a history of  heart failure? No   5. Do you currently have a cold, bronchitis or symptoms of other infection? No   6. Do you have a cough, shortness of breath, or wheezing? No   7. Do you or anyone in your family have previous history of blood clots? No   8. Do you or does anyone in your family have a serious bleeding problem such as prolonged bleeding following surgeries or cuts? No   9. Have you ever had problems with anemia or been told to take iron pills? No   10. Have you had any abnormal blood loss such as black, tarry or bloody stools? No   11. Have you ever had a blood transfusion? No   12. Are you willing to have a blood transfusion if it is medically needed before, during, or after your surgery? Yes   13. Have you or any of your relatives ever had problems with anesthesia? YES - severe nausea after using Fentanyl   14. Do you have sleep apnea, excessive snoring or daytime drowsiness? No   15. Do you have any artifical heart valves or other implanted medical devices like a pacemaker, defibrillator, or  "continuous glucose monitor? No   16. Do you have artificial joints? No   17. Are you allergic to latex? No       HPI:     HPI related to upcoming procedure: Right shoulder injury related to fall January of this year, with initial description of noting a \"hard pop in shoulder\" associated with immediate onset of numbness. Shoulder has been constantly painful since the fall associated with restriction of range of motion, gradually getting better, but still has trouble lifting more than 3 lbs with his right arm, and pain when he tries to play his guitar.    He is right handed.  He works delivering magazines and newspapers.  Reaching hand out is difficult.       HYPERTENSION - Patient has longstanding history of HTN , currently denies any symptoms referable to elevated blood pressure. Specifically denies chest pain, palpitations, dyspnea, orthopnea, PND or peripheral edema. Blood pressure readings have been in normal range. Current medication regimen is as listed below. Patient denies any side effects of medication.       MEDICAL HISTORY:     Patient Active Problem List    Diagnosis Date Noted     Right shoulder injury, subsequent encounter 03/03/2020     Priority: Medium     CARDIOVASCULAR SCREENING; LDL GOAL LESS THAN 160 02/02/2016     Priority: Medium     Hypertension goal BP (blood pressure) < 150/90 02/02/2016     Priority: Medium      Past Medical History:   Diagnosis Date     CARDIOVASCULAR SCREENING; LDL GOAL LESS THAN 160 2/2/2016     Hypertension      Past Surgical History:   Procedure Laterality Date     COLONOSCOPY N/A 12/14/2018    Procedure: COLONOSCOPY;  Surgeon: Rere Calvo MD;  Location:  GI     VASECTOMY  1986    uncomplicated     Current Outpatient Medications   Medication Sig Dispense Refill     amLODIPine (NORVASC) 2.5 MG tablet Take 1 tablet (2.5 mg) by mouth daily 90 tablet 3     Ascorbic Acid (VITAMIN C PO) Take 1,000 mg by mouth daily       ASPIRIN PO Take 81 mg by mouth daily       " folic acid-vit B6-vit B12 (FOLGARD) 0.8-10-0.115 MG TABS per tablet Take by mouth daily       lisinopril (ZESTRIL) 20 MG tablet Take 1 tablet (20 mg) by mouth daily 90 tablet 3     VITAMIN D, CHOLECALCIFEROL, PO Take 400 Units by mouth daily       Omega-3 Fatty Acids (FISH OIL PO)        OTC products: None, except as noted above and herbals and vitamins - Vitamin C    Allergies   Allergen Reactions     Fentanyl Nausea      Latex Allergy: NO    Social History     Tobacco Use     Smoking status: Never Smoker     Smokeless tobacco: Never Used   Substance Use Topics     Alcohol use: Yes     Alcohol/week: 1.0 standard drinks     Types: 1 Standard drinks or equivalent per week     Comment: ocassional drink     History   Drug Use No       REVIEW OF SYSTEMS:   Constitutional, neuro, ENT, endocrine, pulmonary, cardiac, gastrointestinal, genitourinary, musculoskeletal, integument and psychiatric systems are negative, except as otherwise noted.    EXAM:   /82   Pulse 86   Temp 98.5  F (36.9  C) (Tympanic)   Resp 19   Wt 67.1 kg (148 lb)   SpO2 95%   BMI 26.43 kg/m      GENERAL APPEARANCE: healthy, alert and no distress     EYES: EOMI,  PERRL     HENT: ear canals and TM's normal and nose and mouth without ulcers or lesions     NECK: no adenopathy, no asymmetry, masses, or scars and thyroid normal to palpation     RESP: lungs clear to auscultation - no rales, rhonchi or wheezes     CV: regular rates and rhythm, normal S1 S2, no S3 or S4 and no murmur, click or rub     ABDOMEN:  soft, nontender, no HSM or masses and bowel sounds normal     MS: extremities normal- no gross deformities noted, no evidence of inflammation in joints, FROM in all extremities.     SKIN: no suspicious lesions or rashes     NEURO: Normal strength and tone, sensory exam grossly normal, mentation intact and speech normal     PSYCH: mentation appears normal. and affect normal/bright     LYMPHATICS: No cervical adenopathy    DIAGNOSTICS:     EKG:  appears normal, NSR, normal axis, normal intervals, no acute ST/T changes c/w ischemia, no LVH by voltage criteria, no previous tracings to compare.    Labs Resulted Today:   Results for orders placed or performed in visit on 12/10/20   CBC with platelets differential     Status: None   Result Value Ref Range    WBC 7.6 4.0 - 11.0 10e9/L    RBC Count 5.02 4.4 - 5.9 10e12/L    Hemoglobin 15.4 13.3 - 17.7 g/dL    Hematocrit 45.9 40.0 - 53.0 %    MCV 91 78 - 100 fl    MCH 30.7 26.5 - 33.0 pg    MCHC 33.6 31.5 - 36.5 g/dL    RDW 12.4 10.0 - 15.0 %    Platelet Count 213 150 - 450 10e9/L    Diff Method Automated Method     % Neutrophils 54.2 %    % Lymphocytes 28.0 %    % Monocytes 12.3 %    % Eosinophils 5.0 %    % Basophils 0.5 %    Absolute Neutrophil 4.1 1.6 - 8.3 10e9/L    Absolute Lymphocytes 2.1 0.8 - 5.3 10e9/L    Absolute Monocytes 0.9 0.0 - 1.3 10e9/L    Absolute Eosinophils 0.4 0.0 - 0.7 10e9/L    Absolute Basophils 0.0 0.0 - 0.2 10e9/L       Recent Labs   Lab Test 07/10/19  1404 06/19/18  1248 01/17/18  1620   HGB  --   --  14.7   PLT  --   --  396    139  --    POTASSIUM 4.4 4.6  --    CR 0.87 0.78  --       IMPRESSION:   Reason for surgery/procedure: Right shoulder rotator cuff injury  Diagnosis/reason for consult: preoperative evaluation    The proposed surgical procedure is considered INTERMEDIATE risk.    REVISED CARDIAC RISK INDEX  The patient has the following serious cardiovascular risks for perioperative complications such as (MI, PE, VFib and 3  AV Block):  No serious cardiac risks  INTERPRETATION: 0 risks: Class I (very low risk - 0.4% complication rate)    The patient has the following additional risks for perioperative complications:  No identified additional risks      ICD-10-CM    1. Preop general physical exam  Z01.818 CBC with platelets differential     EKG 12-lead complete w/read - Clinics   2. Injury of right rotator cuff, subsequent encounter  S46.001D CBC with platelets differential      EKG 12-lead complete w/read - Clinics   3. Hypertension goal BP (blood pressure) < 150/90  I10 Basic metabolic panel     Albumin Random Urine Quantitative with Creat Ratio   4. CARDIOVASCULAR SCREENING; LDL GOAL LESS THAN 160  Z13.6 Lipid panel reflex to direct LDL Fasting       RECOMMENDATIONS:     APPROVAL GIVEN to proceed with proposed procedure, without further diagnostic evaluation     Signed Electronically by: Gali Kennedy MD    Copy of this evaluation report is provided to requesting physician.    [facility] Preop Guidelines    Revised Cardiac Risk Index

## 2020-12-10 NOTE — LETTER
December 15, 2020      Micah Kelly  [address]  [facility] [address]        Dear LeticiaMark!  It was a pleasure to see you in clinic!  Thank you for getting labs done. Everything looks normal, which is good news.     Your cbc, or complete blood count, which measures red blood cells (to check for anemia and other vitamin deficiencies) and white blood cells (to check for infection and leukemia) is normal, which is great!  Your platelets, which reflect liver function and ability to clot, are normal as well.     Your glucose level, electrolyte level and kidney function, measured with a test called the basic metabolic panel, is normal, which is great news!       Your microalbumen is normal, which is great news. This means you do not have protein in the urine, and that your kidneys are currently functioning well. Keeping blood pressure and blood fats low is the best way to preserve your kidney function over your lifetime.     Your cholesterol looks pretty good!  Your triglycerides are a little high, but everything else is at goal.     Desired or goal levels are:   CHOLESTEROL: Desirable is less than 200.   HDL (Good Cholesterol): Desirable is greater than 40 in men and greater than 50 in women.   LDL (Bad Cholesterol): Desirable is less than 130 or less than 100 in patients with diabetes or vascular disease. For some patients with diabetes or vascular disease, the desireable LDL is less that 70.   TRIGLYCERIDES: Desirable is less than 150.     To keep triglycerides in check,  reduce carbohydrates/sugar in your diet by reducing portion size of carbohydrates such as sweets, juice, alcohol, white bread, pasta, rice, potatoes and cereal.   You can also reduce your triglycerides by increasing your activity level. Exercise for at least 30 min 5 times per week, and lift weights 2-3 times per week to build muscle mass and improve your metabolism.       Resulted Orders   CBC with platelets differential    Result Value Ref Range    WBC 7.6 4.0 - 11.0 10e9/L    RBC Count 5.02 4.4 - 5.9 10e12/L    Hemoglobin 15.4 13.3 - 17.7 g/dL    Hematocrit 45.9 40.0 - 53.0 %    MCV 91 78 - 100 fl    MCH 30.7 26.5 - 33.0 pg    MCHC 33.6 31.5 - 36.5 g/dL    RDW 12.4 10.0 - 15.0 %    Platelet Count 213 150 - 450 10e9/L    Diff Method Automated Method     % Neutrophils 54.2 %    % Lymphocytes 28.0 %    % Monocytes 12.3 %    % Eosinophils 5.0 %    % Basophils 0.5 %    Absolute Neutrophil 4.1 1.6 - 8.3 10e9/L    Absolute Lymphocytes 2.1 0.8 - 5.3 10e9/L    Absolute Monocytes 0.9 0.0 - 1.3 10e9/L    Absolute Eosinophils 0.4 0.0 - 0.7 10e9/L    Absolute Basophils 0.0 0.0 - 0.2 10e9/L   Basic metabolic panel   Result Value Ref Range    Sodium 136 133 - 144 mmol/L    Potassium 4.2 3.4 - 5.3 mmol/L    Chloride 106 94 - 109 mmol/L    Carbon Dioxide 26 20 - 32 mmol/L    Anion Gap 4 3 - 14 mmol/L    Glucose 97 70 - 99 mg/dL      Comment:      Non Fasting    Urea Nitrogen 20 7 - 30 mg/dL    Creatinine 0.74 0.66 - 1.25 mg/dL    GFR Estimate >90 >60 mL/min/[1.73_m2]      Comment:      Non  GFR Calc  Starting 12/18/2018, serum creatinine based estimated GFR (eGFR) will be   calculated using the Chronic Kidney Disease Epidemiology Collaboration   (CKD-EPI) equation.      GFR Estimate If Black >90 >60 mL/min/[1.73_m2]      Comment:       GFR Calc  Starting 12/18/2018, serum creatinine based estimated GFR (eGFR) will be   calculated using the Chronic Kidney Disease Epidemiology Collaboration   (CKD-EPI) equation.      Calcium 9.2 8.5 - 10.1 mg/dL   Lipid panel reflex to direct LDL Fasting   Result Value Ref Range    Cholesterol 195 <200 mg/dL    Triglycerides 172 (H) <150 mg/dL      Comment:      Borderline high:  150-199 mg/dl  High:             200-499 mg/dl  Very high:       >499 mg/dl  Non Fasting      HDL Cholesterol 40 >39 mg/dL    LDL Cholesterol Calculated 121 (H) <100 mg/dL      Comment:      Above desirable:   100-129 mg/dl  Borderline High:  130-159 mg/dL  High:             160-189 mg/dL  Very high:       >189 mg/dl      Non HDL Cholesterol 155 (H) <130 mg/dL      Comment:      Above Desirable:  130-159 mg/dl  Borderline high:  160-189 mg/dl  High:             190-219 mg/dl  Very high:       >219 mg/dl     Albumin Random Urine Quantitative with Creat Ratio   Result Value Ref Range    Creatinine Urine 18 mg/dL    Albumin Urine mg/L <5 mg/L    Albumin Urine mg/g Cr Unable to calculate due to low value 0 - 17 mg/g Cr       If you have any questions or concerns, please call the clinic at the number listed above.       Sincerely,      Gali Kennedy MD

## 2021-02-02 ENCOUNTER — OFFICE VISIT (OUTPATIENT)
Dept: FAMILY MEDICINE | Facility: CLINIC | Age: 66
End: 2021-02-02
Payer: COMMERCIAL

## 2021-02-02 VITALS
BODY MASS INDEX: 26.01 KG/M2 | DIASTOLIC BLOOD PRESSURE: 78 MMHG | WEIGHT: 146.8 LBS | OXYGEN SATURATION: 95 % | HEART RATE: 91 BPM | HEIGHT: 63 IN | SYSTOLIC BLOOD PRESSURE: 114 MMHG | TEMPERATURE: 97 F

## 2021-02-02 DIAGNOSIS — S46.001D INJURY OF RIGHT ROTATOR CUFF, SUBSEQUENT ENCOUNTER: ICD-10-CM

## 2021-02-02 DIAGNOSIS — Z01.818 PREOP GENERAL PHYSICAL EXAM: Primary | ICD-10-CM

## 2021-02-02 LAB
BASOPHILS # BLD AUTO: 0 10E9/L (ref 0–0.2)
BASOPHILS NFR BLD AUTO: 0.4 %
DIFFERENTIAL METHOD BLD: NORMAL
EOSINOPHIL # BLD AUTO: 0.2 10E9/L (ref 0–0.7)
EOSINOPHIL NFR BLD AUTO: 2.1 %
ERYTHROCYTE [DISTWIDTH] IN BLOOD BY AUTOMATED COUNT: 12.8 % (ref 10–15)
HCT VFR BLD AUTO: 45.2 % (ref 40–53)
HGB BLD-MCNC: 15.2 G/DL (ref 13.3–17.7)
LYMPHOCYTES # BLD AUTO: 1.6 10E9/L (ref 0.8–5.3)
LYMPHOCYTES NFR BLD AUTO: 21.4 %
MCH RBC QN AUTO: 30.8 PG (ref 26.5–33)
MCHC RBC AUTO-ENTMCNC: 33.6 G/DL (ref 31.5–36.5)
MCV RBC AUTO: 92 FL (ref 78–100)
MONOCYTES # BLD AUTO: 0.6 10E9/L (ref 0–1.3)
MONOCYTES NFR BLD AUTO: 7.6 %
NEUTROPHILS # BLD AUTO: 5 10E9/L (ref 1.6–8.3)
NEUTROPHILS NFR BLD AUTO: 68.5 %
PLATELET # BLD AUTO: 202 10E9/L (ref 150–450)
RBC # BLD AUTO: 4.93 10E12/L (ref 4.4–5.9)
WBC # BLD AUTO: 7.3 10E9/L (ref 4–11)

## 2021-02-02 PROCEDURE — 36415 COLL VENOUS BLD VENIPUNCTURE: CPT | Performed by: FAMILY MEDICINE

## 2021-02-02 PROCEDURE — 99214 OFFICE O/P EST MOD 30 MIN: CPT | Performed by: FAMILY MEDICINE

## 2021-02-02 PROCEDURE — 85025 COMPLETE CBC W/AUTO DIFF WBC: CPT | Performed by: FAMILY MEDICINE

## 2021-02-02 PROCEDURE — 80053 COMPREHEN METABOLIC PANEL: CPT | Performed by: FAMILY MEDICINE

## 2021-02-02 ASSESSMENT — MIFFLIN-ST. JEOR: SCORE: 1337.04

## 2021-02-02 NOTE — PATIENT INSTRUCTIONS

## 2021-02-02 NOTE — LETTER
February 4, 2021      Micah Kelly  [address]  [facility] [address]        Dear PatLeticia,    We are writing to inform you of your test results.    Stable pre-op labs    Resulted Orders   CBC with platelets differential   Result Value Ref Range    WBC 7.3 4.0 - 11.0 10e9/L    RBC Count 4.93 4.4 - 5.9 10e12/L    Hemoglobin 15.2 13.3 - 17.7 g/dL    Hematocrit 45.2 40.0 - 53.0 %    MCV 92 78 - 100 fl    MCH 30.8 26.5 - 33.0 pg    MCHC 33.6 31.5 - 36.5 g/dL    RDW 12.8 10.0 - 15.0 %    Platelet Count 202 150 - 450 10e9/L    Diff Method Automated Method     % Neutrophils 68.5 %    % Lymphocytes 21.4 %    % Monocytes 7.6 %    % Eosinophils 2.1 %    % Basophils 0.4 %    Absolute Neutrophil 5.0 1.6 - 8.3 10e9/L    Absolute Lymphocytes 1.6 0.8 - 5.3 10e9/L    Absolute Monocytes 0.6 0.0 - 1.3 10e9/L    Absolute Eosinophils 0.2 0.0 - 0.7 10e9/L    Absolute Basophils 0.0 0.0 - 0.2 10e9/L   Comprehensive metabolic panel   Result Value Ref Range    Sodium 138 133 - 144 mmol/L    Potassium 4.4 3.4 - 5.3 mmol/L    Chloride 108 94 - 109 mmol/L    Carbon Dioxide 25 20 - 32 mmol/L    Anion Gap 5 3 - 14 mmol/L    Glucose 108 (H) 70 - 99 mg/dL      Comment:      Non Fasting    Urea Nitrogen 20 7 - 30 mg/dL    Creatinine 0.78 0.66 - 1.25 mg/dL    GFR Estimate >90 >60 mL/min/[1.73_m2]      Comment:      Non  GFR Calc  Starting 12/18/2018, serum creatinine based estimated GFR (eGFR) will be   calculated using the Chronic Kidney Disease Epidemiology Collaboration   (CKD-EPI) equation.      GFR Estimate If Black >90 >60 mL/min/[1.73_m2]      Comment:       GFR Calc  Starting 12/18/2018, serum creatinine based estimated GFR (eGFR) will be   calculated using the Chronic Kidney Disease Epidemiology Collaboration   (CKD-EPI) equation.      Calcium 9.0 8.5 - 10.1 mg/dL    Bilirubin Total 0.8 0.2 - 1.3 mg/dL    Albumin 3.8 3.4 - 5.0 g/dL    Protein Total 7.1 6.8 - 8.8 g/dL    Alkaline Phosphatase 83 40  - 150 U/L    ALT 27 0 - 70 U/L    AST 21 0 - 45 U/L       If you have any questions or concerns, please call the clinic at the number listed above.       Sincerely,      Shanda Anderson MD/nr

## 2021-02-02 NOTE — PROGRESS NOTES
[facility] [address] [place] [address]  Phone: [phone]  Primary Provider: Gali Kennedy  Pre-op Performing Provider: BRETT SHARMA    PREOPERATIVE EVALUATION:  Today's date: 2/2/2021    Micah Kelly is a 65 year old male who presents for a preoperative evaluation.    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      Preop Questions 2/2/2021   1. Have you ever had a heart attack or stroke? No   2. Have you ever had surgery on your heart or blood vessels, such as a stent placement, a coronary artery bypass, or surgery on an artery in your head, neck, heart, or legs? No   3. Do you have chest pain with activity? No   4. Do you have a history of  heart failure? No   5. Do you currently have a cold, bronchitis or symptoms of other infection? No   6. Do you have a cough, shortness of breath, or wheezing? No   7. Do you or anyone in your family have previous history of blood clots? No   8. Do you or does anyone in your family have a serious bleeding problem such as prolonged bleeding following surgeries or cuts? No   9. Have you ever had problems with anemia or been told to take iron pills? No   10. Have you had any abnormal blood loss such as black, tarry or bloody stools? No   11. Have you ever had a blood transfusion? No   12. Are you willing to have a blood transfusion if it is  medically needed before, during, or after your surgery? Yes   13. Have you or any of your relatives ever had problems with anesthesia? YES - he had a colonoscopy about 2.5 years ago and he was feeling nauseous after the procedure and vomiting    14. Do you have sleep apnea, excessive snoring or daytime drowsiness? No   15. Do you have any artifical heart valves or other implanted medical devices like a pacemaker, defibrillator, or continuous glucose monitor? No   16. Do you have artificial joints? No   17. Are you allergic to latex? No     Health Care Directive:  Patient does not have a Health Care Directive or Living Will: Discussed advance care planning with patient; however, patient declined at this time.    Preoperative Review of :   reviewed - no record of controlled substances prescribed.      Status of Chronic Conditions:  See problem list for active medical problems.  Problems all longstanding and stable, except as noted/documented.  See ROS for pertinent symptoms related to these conditions.      Review of Systems  Constitutional, neuro, ENT, endocrine, pulmonary, cardiac, gastrointestinal, genitourinary, musculoskeletal, integument and psychiatric systems are negative, except as otherwise noted.    Patient Active Problem List    Diagnosis Date Noted     Right shoulder injury, subsequent encounter 03/03/2020     Priority: Medium     CARDIOVASCULAR SCREENING; LDL GOAL LESS THAN 160 02/02/2016     Priority: Medium     Hypertension goal BP (blood pressure) < 150/90 02/02/2016     Priority: Medium      Past Medical History:   Diagnosis Date     CARDIOVASCULAR SCREENING; LDL GOAL LESS THAN 160 2/2/2016     Hypertension      Past Surgical History:   Procedure Laterality Date     COLONOSCOPY N/A 12/14/2018    Procedure: COLONOSCOPY;  Surgeon: Rere Calvo MD;  Location:  GI     VASECTOMY  1986    uncomplicated     Current Outpatient Medications   Medication Sig Dispense Refill     amLODIPine  (NORVASC) 2.5 MG tablet Take 1 tablet (2.5 mg) by mouth daily 90 tablet 3     Ascorbic Acid (VITAMIN C PO) Take 1,000 mg by mouth daily       ASPIRIN PO Take 81 mg by mouth daily       folic acid-vit B6-vit B12 (FOLGARD) 0.8-10-0.115 MG TABS per tablet Take by mouth daily       lisinopril (ZESTRIL) 20 MG tablet Take 1 tablet (20 mg) by mouth daily 90 tablet 3     Omega-3 Fatty Acids (FISH OIL PO)        VITAMIN D, CHOLECALCIFEROL, PO Take 400 Units by mouth daily         Allergies   Allergen Reactions     Fentanyl Nausea        Social History     Tobacco Use     Smoking status: Never Smoker     Smokeless tobacco: Never Used   Substance Use Topics     Alcohol use: Yes     Alcohol/week: 1.0 standard drinks     Types: 1 Standard drinks or equivalent per week     Comment: ocassional drink     Family History   Problem Relation Age of Onset     Colon Cancer Mother 88     Peptic Ulcer Disease Mother      Hypertension Father 98        d age 98 of stroke     Alzheimer Disease Father      Hyperlipidemia Father      Hypothyroidism Father      Kidney Disease Father      History   Drug Use No         Objective     There were no vitals taken for this visit.    Physical Exam    GENERAL APPEARANCE: healthy, alert and no distress     EYES: EOMI,  PERRL     HENT: ear canals and TM's normal and nose and mouth without ulcers or lesions     NECK: no adenopathy, no asymmetry, masses, or scars and thyroid normal to palpation     RESP: lungs clear to auscultation - no rales, rhonchi or wheezes     CV: regular rates and rhythm, normal S1 S2, no S3 or S4 and no murmur, click or rub     ABDOMEN:  soft, nontender, no HSM or masses and bowel sounds normal     MS: extremities normal- no gross deformities noted, no evidence of inflammation in joints, FROM in all extremities.     SKIN: no suspicious lesions or rashes     NEURO: Normal strength and tone, sensory exam grossly normal, mentation intact and speech normal     PSYCH: mentation appears  normal. and affect normal/bright     LYMPHATICS: No cervical adenopathy    Recent Labs   Lab Test 12/10/20  1106 07/10/19  1404   HGB 15.4  --      --     141   POTASSIUM 4.2 4.4   CR 0.74 0.87        Diagnostics:  Labs pending at this time.  Results will be reviewed when available.   No EKG this visit, completed in the last 90 days.    Revised Cardiac Risk Index (RCRI):  The patient has the following serious cardiovascular risks for perioperative complications:   - No serious cardiac risks = 0 points     RCRI Interpretation: 0 points: Class I (very low risk - 0.4% complication rate)           Assessment & Plan   The proposed surgical procedure is considered INTERMEDIATE risk.    Preop general physical exam    - CBC with platelets differential  - Comprehensive metabolic panel  - Asymptomatic COVID-19 Virus (Coronavirus) by PCR; Future    Injury of right rotator cuff, subsequent encounter    - CBC with platelets differential  - Comprehensive metabolic panel      Risks and Recommendations:  The patient has the following additional risks and recommendations for perioperative complications:   - No identified additional risk factors other than previously addressed    Medication Instructions:  Patient is to take all scheduled medications on the day of surgery    RECOMMENDATION:  APPROVAL GIVEN to proceed with proposed procedure, without further diagnostic evaluation.    Signed Electronically by: Shanda Anderson MD    Copy of this evaluation report is provided to requesting physician.    [facility] Preop Guidelines    Revised Cardiac Risk Index

## 2021-02-03 LAB
ALBUMIN SERPL-MCNC: 3.8 G/DL (ref 3.4–5)
ALP SERPL-CCNC: 83 U/L (ref 40–150)
ALT SERPL W P-5'-P-CCNC: 27 U/L (ref 0–70)
ANION GAP SERPL CALCULATED.3IONS-SCNC: 5 MMOL/L (ref 3–14)
AST SERPL W P-5'-P-CCNC: 21 U/L (ref 0–45)
BILIRUB SERPL-MCNC: 0.8 MG/DL (ref 0.2–1.3)
BUN SERPL-MCNC: 20 MG/DL (ref 7–30)
CALCIUM SERPL-MCNC: 9 MG/DL (ref 8.5–10.1)
CHLORIDE SERPL-SCNC: 108 MMOL/L (ref 94–109)
CO2 SERPL-SCNC: 25 MMOL/L (ref 20–32)
CREAT SERPL-MCNC: 0.78 MG/DL (ref 0.66–1.25)
GFR SERPL CREATININE-BSD FRML MDRD: >90 ML/MIN/{1.73_M2}
GLUCOSE SERPL-MCNC: 108 MG/DL (ref 70–99)
POTASSIUM SERPL-SCNC: 4.4 MMOL/L (ref 3.4–5.3)
PROT SERPL-MCNC: 7.1 G/DL (ref 6.8–8.8)
SODIUM SERPL-SCNC: 138 MMOL/L (ref 133–144)

## 2021-02-05 DIAGNOSIS — Z01.818 PREOP GENERAL PHYSICAL EXAM: ICD-10-CM

## 2021-02-05 LAB
LABORATORY COMMENT REPORT: NORMAL
SARS-COV-2 RNA RESP QL NAA+PROBE: NEGATIVE
SARS-COV-2 RNA RESP QL NAA+PROBE: NORMAL
SPECIMEN SOURCE: NORMAL
SPECIMEN SOURCE: NORMAL

## 2021-02-05 PROCEDURE — 87635 SARS-COV-2 COVID-19 AMP PRB: CPT | Performed by: FAMILY MEDICINE

## 2021-02-05 PROCEDURE — 99207 PR NO CHARGE LOS: CPT

## 2021-02-22 ENCOUNTER — TRANSFERRED RECORDS (OUTPATIENT)
Dept: HEALTH INFORMATION MANAGEMENT | Facility: CLINIC | Age: 66
End: 2021-02-22

## 2021-03-18 ENCOUNTER — TRANSFERRED RECORDS (OUTPATIENT)
Dept: HEALTH INFORMATION MANAGEMENT | Facility: CLINIC | Age: 66
End: 2021-03-18

## 2021-04-29 ENCOUNTER — TRANSFERRED RECORDS (OUTPATIENT)
Dept: HEALTH INFORMATION MANAGEMENT | Facility: CLINIC | Age: 66
End: 2021-04-29

## 2021-07-08 ENCOUNTER — TRANSFERRED RECORDS (OUTPATIENT)
Dept: HEALTH INFORMATION MANAGEMENT | Facility: CLINIC | Age: 66
End: 2021-07-08

## 2021-09-15 DIAGNOSIS — I10 HYPERTENSION GOAL BP (BLOOD PRESSURE) < 150/90: ICD-10-CM

## 2021-09-17 RX ORDER — LISINOPRIL 20 MG/1
TABLET ORAL
Qty: 90 TABLET | Refills: 0 | Status: SHIPPED | OUTPATIENT
Start: 2021-09-17 | End: 2021-10-01

## 2021-09-17 NOTE — TELEPHONE ENCOUNTER
ACE Inhibitors (Including Combos) Protocol [id number]/15/2021 12:30 AM   Blood pressure under 140/90 in past 12 months Protocol Details    Recent (12 mo) or future (30 days) visit within the authorizing provider's specialty     Medication is active on med list     Patient is age 18 or older     Normal serum creatinine on file in past 12 months     Normal serum potassium on file in past 12 months

## 2021-10-01 ENCOUNTER — OFFICE VISIT (OUTPATIENT)
Dept: FAMILY MEDICINE | Facility: CLINIC | Age: 66
End: 2021-10-01
Payer: COMMERCIAL

## 2021-10-01 VITALS
RESPIRATION RATE: 16 BRPM | WEIGHT: 147.08 LBS | TEMPERATURE: 97.9 F | OXYGEN SATURATION: 95 % | SYSTOLIC BLOOD PRESSURE: 100 MMHG | BODY MASS INDEX: 26.06 KG/M2 | HEIGHT: 63 IN | DIASTOLIC BLOOD PRESSURE: 70 MMHG | HEART RATE: 79 BPM

## 2021-10-01 DIAGNOSIS — I10 HYPERTENSION GOAL BP (BLOOD PRESSURE) < 150/90: ICD-10-CM

## 2021-10-01 DIAGNOSIS — Z00.00 ENCOUNTER FOR MEDICARE ANNUAL WELLNESS EXAM: Primary | ICD-10-CM

## 2021-10-01 DIAGNOSIS — B35.1 ONYCHOMYCOSIS: ICD-10-CM

## 2021-10-01 DIAGNOSIS — Z13.6 CARDIOVASCULAR SCREENING; LDL GOAL LESS THAN 160: ICD-10-CM

## 2021-10-01 DIAGNOSIS — Z23 NEED FOR PNEUMOCOCCAL VACCINATION: ICD-10-CM

## 2021-10-01 PROCEDURE — G0438 PPPS, INITIAL VISIT: HCPCS | Performed by: FAMILY MEDICINE

## 2021-10-01 PROCEDURE — 90732 PPSV23 VACC 2 YRS+ SUBQ/IM: CPT | Performed by: FAMILY MEDICINE

## 2021-10-01 PROCEDURE — 99213 OFFICE O/P EST LOW 20 MIN: CPT | Mod: 25 | Performed by: FAMILY MEDICINE

## 2021-10-01 PROCEDURE — 90471 IMMUNIZATION ADMIN: CPT | Performed by: FAMILY MEDICINE

## 2021-10-01 PROCEDURE — 82043 UR ALBUMIN QUANTITATIVE: CPT | Performed by: FAMILY MEDICINE

## 2021-10-01 PROCEDURE — 36415 COLL VENOUS BLD VENIPUNCTURE: CPT | Performed by: FAMILY MEDICINE

## 2021-10-01 PROCEDURE — 80053 COMPREHEN METABOLIC PANEL: CPT | Performed by: FAMILY MEDICINE

## 2021-10-01 PROCEDURE — 80061 LIPID PANEL: CPT | Performed by: FAMILY MEDICINE

## 2021-10-01 RX ORDER — LISINOPRIL 20 MG/1
20 TABLET ORAL DAILY
Qty: 90 TABLET | Refills: 3 | Status: SHIPPED | OUTPATIENT
Start: 2021-10-01 | End: 2022-12-17

## 2021-10-01 ASSESSMENT — ENCOUNTER SYMPTOMS
MYALGIAS: 1
COUGH: 1
HEMATOCHEZIA: 0
ABDOMINAL PAIN: 0
HEMATURIA: 0
CONSTIPATION: 0
DIZZINESS: 0
JOINT SWELLING: 0
WEAKNESS: 1
SHORTNESS OF BREATH: 0
HEARTBURN: 0
PALPITATIONS: 0
CHILLS: 0
DYSURIA: 0
FREQUENCY: 0
ARTHRALGIAS: 0
EYE PAIN: 0
HEADACHES: 0
SORE THROAT: 0
NAUSEA: 0
DIARRHEA: 0
PARESTHESIAS: 0
NERVOUS/ANXIOUS: 0
FEVER: 0

## 2021-10-01 ASSESSMENT — MIFFLIN-ST. JEOR: SCORE: 1333.31

## 2021-10-01 ASSESSMENT — ACTIVITIES OF DAILY LIVING (ADL): CURRENT_FUNCTION: NO ASSISTANCE NEEDED

## 2021-10-01 NOTE — NURSING NOTE
Prior to immunization administration, verified patients identity using patient s name and date of birth. Please see Immunization Activity for additional information.     Screening Questionnaire for Adult Immunization    Are you sick today?   No   Do you have allergies to medications, food, a vaccine component or latex?   No   Have you ever had a serious reaction after receiving a vaccination?   No   Do you have a long-term health problem with heart, lung, kidney, or metabolic disease (e.g., diabetes), asthma, a blood disorder, no spleen, complement component deficiency, a cochlear implant, or a spinal fluid leak?  Are you on long-term aspirin therapy?   No   Do you have cancer, leukemia, HIV/AIDS, or any other immune system problem?   No   Do you have a parent, brother, or sister with an immune system problem?   No   In the past 3 months, have you taken medications that affect  your immune system, such as prednisone, other steroids, or anticancer drugs; drugs for the treatment of rheumatoid arthritis, Crohn s disease, or psoriasis; or have you had radiation treatments?   No   Have you had a seizure, or a brain or other nervous system problem?   No   During the past year, have you received a transfusion of blood or blood    products, or been given immune (gamma) globulin or antiviral drug?   No   For women: Are you pregnant or is there a chance you could become       pregnant during the next month?   No   Have you received any vaccinations in the past 4 weeks?   No     Immunization questionnaire answers were all negative.        Per orders of Dr. Kennedy, injection of Pneumo-23 given by Joselin Anderson MA. Patient instructed to remain in clinic for 15 minutes afterwards, and to report any adverse reaction to me immediately.       Screening performed by Joselin Anderson MA on 10/1/2021 at 9:35 AM.

## 2021-10-01 NOTE — PATIENT INSTRUCTIONS
Shingles vaccine  I strongly recommend getting the shingles vaccine (Shingrix) if you are over age 50, but please check with your insurance to see how much you might have to pay for this vaccine! If you are on most insurance plans including Medicare, it's covered if you get it at a pharmacy but not in a clinic.    This shot will prevent shingles, a painful skin rash that you are at risk for getting if you have ever had chicken pox. Sometimes the pain will last the rest of your life, even after the rash has healed, and there is not much that we can do to relieve the pain. The vaccine is 98% effective at preventing shingles.    Please consider getting this vaccine! You'll need #2 vaccines 2-4 months apart to be protected.        Patient Education   Personalized Prevention Plan  You are due for the preventive services outlined below.  Your care team is available to assist you in scheduling these services.  If you have already completed any of these items, please share that information with your care team to update in your medical record.  Health Maintenance Due   Topic Date Due     ANNUAL REVIEW OF HM ORDERS  Never done     Zoster (Shingles) Vaccine (2 of 3) 10/06/2017     Pneumococcal Vaccine (1 of 1 - PPSV23) Never done     AORTIC ANEURYSM SCREENING (SYSTEM ASSIGNED)  Never done     Annual Wellness Visit  03/03/2021     FALL RISK ASSESSMENT  03/03/2021

## 2021-10-01 NOTE — LETTER
October 6, 2021      Micah Kelly  [address]  [place] [facility] [address]        Dear Mark!  It was a pleasure, as always, to see you in clinic!  Thank you for getting labs done. Everything looks normal, which is good news.       The testing of your blood sugar, kidney function, liver function and electrolytes was normal.     Your microalbumen is normal, which is great news. This means you do not have protein in the urine, and that your kidneys are currently functioning well. Keeping blood pressure and blood fats low is the best way to preserve your kidney function over your lifetime.     Your cholesterol has creeped up a little. You could reconsider taking a daily aspirin if you don't have side effects from it, and you could consider starting a cholesterol lowering medication, as your 10 year risk of a heart attack or stroke is now more than 10%. Please let me know if you have any questions about this!    Desired or goal levels are:  CHOLESTEROL: Desirable is less than 200.  HDL (Good Cholesterol): Desirable is greater than 40 in men and greater than 50 in women.  LDL (Bad Cholesterol): Desirable is less than 130 or less than 100 in patients with diabetes or vascular disease. For some patients with diabetes or vascular disease, the desireable LDL is less that 70.  TRIGLYCERIDES: Desirable is less than 150.    As you may know, abnormal cholesterol is one factor that increases your risk for heart disease and stroke. You can improve your cholesterol by controlling the amount and type of fat you eat and by increasing your daily activity level.    Here are some ways to improve your nutrition (adapted from the American [employer] handout):  Eat less fat (especially butter, Crisco and other saturated fats)  Buy lean cuts of meat; reduce your portions of red meat or substitute poultry or fish  Use skim milk and low-fat dairy products  Eat no more than 4 egg yolks per week  Avoid fried  or fast foods that are high in fat  Eat more fruits and vegetables    If you have any questions, please contact the clinic or schedule an appointment with me, thank you!      Sincerely,  Dr. Gali Kennedy MD  10/6/2021      Resulted Orders   Comprehensive metabolic panel (BMP + Alb, Alk Phos, ALT, AST, Total. Bili, TP)   Result Value Ref Range    Sodium 135 133 - 144 mmol/L    Potassium 4.6 3.4 - 5.3 mmol/L    Chloride 104 94 - 109 mmol/L    Carbon Dioxide (CO2) 25 20 - 32 mmol/L    Anion Gap 6 3 - 14 mmol/L    Urea Nitrogen 17 7 - 30 mg/dL    Creatinine 0.83 0.66 - 1.25 mg/dL    Calcium 9.3 8.5 - 10.1 mg/dL    Glucose 99 70 - 99 mg/dL    Alkaline Phosphatase 95 40 - 150 U/L    AST 22 0 - 45 U/L    ALT 32 0 - 70 U/L    Protein Total 7.5 6.8 - 8.8 g/dL    Albumin 4.0 3.4 - 5.0 g/dL    Bilirubin Total 0.8 0.2 - 1.3 mg/dL    GFR Estimate >90 >60 mL/min/1.73m2      Comment:      As of July 11, 2021, eGFR is calculated by the CKD-EPI creatinine equation, without race adjustment. eGFR can be influenced by muscle mass, exercise, and diet. The reported eGFR is an estimation only and is only applicable if the renal function is stable.   Albumin Random Urine Quantitative with Creat Ratio   Result Value Ref Range    Creatinine Urine mg/dL 103 mg/dL    Albumin Urine mg/L 6 mg/L    Albumin Urine mg/g Cr 5.83 0.00 - 17.00 mg/g Cr   Lipid panel reflex to direct LDL Fasting   Result Value Ref Range    Cholesterol 202 (H) <200 mg/dL    Triglycerides 182 (H) <150 mg/dL    Direct Measure HDL 42 >=40 mg/dL    LDL Cholesterol Calculated 124 (H) <=100 mg/dL    Non HDL Cholesterol 160 (H) <130 mg/dL    Patient Fasting > 8hrs? Yes     Narrative    Cholesterol  Desirable:  <200 mg/dL    Triglycerides  Normal:  Less than 150 mg/dL  Borderline High:  150-199 mg/dL  High:  200-499 mg/dL  Very High:  Greater than or equal to 500 mg/dL    Direct Measure HDL  Female:  Greater than or equal to 50 mg/dL   Male:  Greater than or equal to 40  mg/dL    LDL Cholesterol  Desirable:  <100mg/dL  Above Desirable:  100-129 mg/dL   Borderline High:  130-159 mg/dL   High:  160-189 mg/dL   Very High:  >= 190 mg/dL    Non HDL Cholesterol  Desirable:  130 mg/dL  Above Desirable:  130-159 mg/dL  Borderline High:  160-189 mg/dL  High:  190-219 mg/dL  Very High:  Greater than or equal to 220 mg/dL       If you have any questions or concerns, please call the clinic at the number listed above.       Sincerely,      Gali Kennedy MD

## 2021-10-01 NOTE — PROGRESS NOTES
"SUBJECTIVE:   Micah Kelly is a 66 year old male who presents for Preventive Visit.    {  Are you in the first 12 months of your Medicare coverage?  No    Hypertension Follow-up      Do you check your blood pressure regularly outside of the clinic? Yes     Are you following a low salt diet? Yes    Are your blood pressures ever more than 140 on the top number (systolic) OR more   than 90 on the bottom number (diastolic), for example 140/90? No     Toe nail color changes  Patient would like to talk about his left big toe, it might be infected because the nail looks like its coming off. The pain is on and off. Its been going on for about 7-8 months, after a minor injury when he dropped something on it. It looks like it's growing out but sometimes pieces of nail come off, and the toe can ache.    Healthy Habits:     In general, how would you rate your overall health?  Good    Frequency of exercise:  2-3 days/week    Duration of exercise:  15-30 minutes    Do you usually eat at least 4 servings of fruit and vegetables a day, include whole grains    & fiber and avoid regularly eating high fat or \"junk\" foods?  Yes    Taking medications regularly:  No    Medication side effects:  None    Ability to successfully perform activities of daily living:  No assistance needed    Home Safety:  No safety concerns identified    Hearing Impairment:  Difficulty following a conversation in a noisy restaurant or crowded room and difficulty following dialogue in the theater    In the past 6 months, have you been bothered by leaking of urine?  No    In general, how would you rate your overall mental or emotional health?  Good      PHQ-2 Total Score: 0    Additional concerns today:  Yes    Do you feel safe in your environment? Yes    Have you ever done Advance Care Planning? (For example, a Health Directive, POLST, or a discussion with a medical provider or your loved ones about your wishes): No, advance care planning information given " to patient to review.  Patient plans to discuss their wishes with loved ones or provider.         Fall risk  Fallen 2 or more times in the past year?: No  Any fall with injury in the past year?: No    Cognitive Screening   1) Repeat 3 items (Leader, Season, Table)    2) Clock draw: NORMAL  3) 3 item recall: Recalls 2 objects   Results: NORMAL clock, 1-2 items recalled: COGNITIVE IMPAIRMENT LESS LIKELY    Mini-CogTM Copyright GUILLAUME Ramirez. Licensed by the author for use in [facility]; reprinted with permission (cindy@[facility]). All rights reserved.        Reviewed and updated as needed this visit by clinical staff  Tobacco  Allergies  Meds  Problems  Med Hx  Surg Hx  Fam Hx  Soc Hx          Reviewed and updated as needed this visit by Provider     Problems            Social History     Tobacco Use     Smoking status: Never Smoker     Smokeless tobacco: Never Used   Substance Use Topics     Alcohol use: Yes     Alcohol/week: 1.0 standard drinks     Types: 1 Standard drinks or equivalent per week     Comment: ocassional drink     If you drink alcohol do you typically have >3 drinks per day or >7 drinks per week? No    Alcohol Use 10/1/2021   Prescreen: >3 drinks/day or >7 drinks/week? No   Prescreen: >3 drinks/day or >7 drinks/week? -   No flowsheet data found.        Hypertension Follow-up      Do you check your blood pressure regularly outside of the clinic? No     Are you following a low salt diet? No    Are your blood pressures ever more than 140 on the top number (systolic) OR more   than 90 on the bottom number (diastolic), for example 140/90? Not checking       Current providers sharing in care for this patient include:   Patient Care Team:  Gali Kennedy MD as PCP - General (Family Practice)  Gali Kennedy MD as Assigned PCP    The following health maintenance items are reviewed in Epic and correct as of today:  Health Maintenance Due   Topic Date Due     ZOSTER IMMUNIZATION  "(2 of 3) 10/06/2017     AORTIC ANEURYSM SCREENING (SYSTEM ASSIGNED)  Never done     FALL RISK ASSESSMENT  03/03/2021     BP Readings from Last 3 Encounters:   10/01/21 100/70   02/02/21 114/78   12/10/20 126/82    Wt Readings from Last 3 Encounters:   10/01/21 66.7 kg (147 lb 1.3 oz)   02/02/21 66.6 kg (146 lb 12.8 oz)   12/10/20 67.1 kg (148 lb)                  Allergies   Allergen Reactions     Fentanyl Nausea       Review of Systems   Constitutional: Negative for chills and fever.   HENT: Positive for congestion. Negative for ear pain, hearing loss and sore throat.    Eyes: Negative for pain and visual disturbance.   Respiratory: Positive for cough. Negative for shortness of breath.    Cardiovascular: Negative for chest pain, palpitations and peripheral edema.   Gastrointestinal: Negative for abdominal pain, constipation, diarrhea, heartburn, hematochezia and nausea.   Genitourinary: Negative for discharge, dysuria, frequency, genital sores, hematuria, impotence and urgency.   Musculoskeletal: Positive for myalgias. Negative for arthralgias and joint swelling.   Skin: Negative for rash.   Neurological: Positive for weakness. Negative for dizziness, headaches and paresthesias.   Psychiatric/Behavioral: Negative for mood changes. The patient is not nervous/anxious.        OBJECTIVE:   /70 (BP Location: Right arm, Patient Position: Sitting, Cuff Size: Adult Regular)   Pulse 79   Temp 97.9  F (36.6  C) (Temporal)   Resp 16   Ht 1.594 m (5' 2.75\")   Wt 66.7 kg (147 lb 1.3 oz)   SpO2 95%   BMI 26.26 kg/m   Estimated body mass index is 26.26 kg/m  as calculated from the following:    Height as of this encounter: 1.594 m (5' 2.75\").    Weight as of this encounter: 66.7 kg (147 lb 1.3 oz).  Physical Exam  GENERAL: healthy, alert and no distress  EYES: Eyes grossly normal to inspection, PERRL and conjunctivae and sclerae normal  HENT: ear canals and TM's normal, nose and mouth without ulcers or lesions  NECK: " no adenopathy, no asymmetry, masses, or scars and thyroid normal to palpation  RESP: lungs clear to auscultation - no rales, rhonchi or wheezes  CV: regular rate and rhythm, normal S1 S2, no S3 or S4, no murmur, click or rub, no peripheral edema and peripheral pulses strong  ABDOMEN: soft, nontender, no hepatosplenomegaly, no masses and bowel sounds normal  MS: no gross musculoskeletal defects noted, no edema  SKIN: no suspicious lesions or rashes  NEURO: Normal strength and tone, mentation intact and speech normal  PSYCH: mentation appears normal, affect normal/bright      ASSESSMENT / PLAN:   (Z00.00) Encounter for Medicare annual wellness exam  (primary encounter diagnosis)  Comment: routine      (I10) Hypertension goal BP (blood pressure) < 150/90  Comment:   BP Readings from Last 3 Encounters:   10/01/21 100/70   02/02/21 114/78   12/10/20 126/82    At goal  The current medical regimen is effective;  continue present plan and medications.    Plan: Albumin Random Urine Quantitative with Creat         Ratio, Comprehensive metabolic panel (BMP +         Alb, Alk Phos, ALT, AST, Total. Bili, TP),         lisinopril (ZESTRIL) 20 MG tablet            (B35.1) Onychomycosis  Comment: new problem, impacting part of great toenail, nailbase appears healthy.  Options include stating an antifungal, would need to take for 6 - 12 months, applying tea tree oil, apple vinegar or hydrogen peroxide to nail nightly, or just waiting for nail to grow ou.  Plan: nothing for now, he'll let me know if he would like antifungal prescription. Note previously normal LFTs.  Liver Function Studies - Recent Labs   Lab Test 02/02/21  1348   PROTTOTAL 7.1   ALBUMIN 3.8   BILITOTAL 0.8   ALKPHOS 83   AST 21   ALT 27         (Z13.6) CARDIOVASCULAR SCREENING; LDL GOAL LESS THAN 160  Comment:   LDL Cholesterol Calculated   Date Value Ref Range Status   12/10/2020 121 (H) <100 mg/dL Final     Comment:     Above desirable:  100-129 mg/dl  Borderline  "High:  130-159 mg/dL  High:             160-189 mg/dL  Very high:       >189 mg/dl      at goal  Plan: Lipid panel reflex to direct LDL Fasting,         Comprehensive metabolic panel (BMP + Alb, Alk         Phos, ALT, AST, Total. Bili, TP)            (Z23) Need for pneumococcal vaccination  Comment:   Plan: PPSV23, IM/SUBQ (2+ YRS) - [id number]            COUNSELING:  Reviewed preventive health counseling, as reflected in patient instructions    Estimated body mass index is 26.26 kg/m  as calculated from the following:    Height as of this encounter: 1.594 m (5' 2.75\").    Weight as of this encounter: 66.7 kg (147 lb 1.3 oz).        He reports that he has never smoked. He has never used smokeless tobacco.      Appropriate preventive services were discussed with this patient, including applicable screening as appropriate for cardiovascular disease, diabetes, osteopenia/osteoporosis, and glaucoma.  As appropriate for age/gender, discussed screening for colorectal cancer, prostate cancer, breast cancer, and cervical cancer. Checklist reviewing preventive services available has been given to the patient.    Reviewed patients plan of care and provided an AVS. The Intermediate Care Plan ( asthma action plan, low back pain action plan, and migraine action plan) for Micah meets the Care Plan requirement. This Care Plan has been established and reviewed with the Patient.    Counseling Resources:  ATP IV Guidelines  Pooled Cohorts Equation Calculator  Breast Cancer Risk Calculator  Breast Cancer: Medication to Reduce Risk  FRAX Risk Assessment  ICSI Preventive Guidelines  Dietary Guidelines for Americans, 2010  Heartscape's MyPlate  ASA Prophylaxis  Lung CA Screening    Gali Kennedy MD  [facility]    Identified Health Risks:  "

## 2021-10-03 LAB
ALBUMIN SERPL-MCNC: 4 G/DL (ref 3.4–5)
ALP SERPL-CCNC: 95 U/L (ref 40–150)
ALT SERPL W P-5'-P-CCNC: 32 U/L (ref 0–70)
ANION GAP SERPL CALCULATED.3IONS-SCNC: 6 MMOL/L (ref 3–14)
AST SERPL W P-5'-P-CCNC: 22 U/L (ref 0–45)
BILIRUB SERPL-MCNC: 0.8 MG/DL (ref 0.2–1.3)
BUN SERPL-MCNC: 17 MG/DL (ref 7–30)
CALCIUM SERPL-MCNC: 9.3 MG/DL (ref 8.5–10.1)
CHLORIDE BLD-SCNC: 104 MMOL/L (ref 94–109)
CHOLEST SERPL-MCNC: 202 MG/DL
CO2 SERPL-SCNC: 25 MMOL/L (ref 20–32)
CREAT SERPL-MCNC: 0.83 MG/DL (ref 0.66–1.25)
CREAT UR-MCNC: 103 MG/DL
FASTING STATUS PATIENT QL REPORTED: YES
GFR SERPL CREATININE-BSD FRML MDRD: >90 ML/MIN/1.73M2
GLUCOSE BLD-MCNC: 99 MG/DL (ref 70–99)
HDLC SERPL-MCNC: 42 MG/DL
LDLC SERPL CALC-MCNC: 124 MG/DL
MICROALBUMIN UR-MCNC: 6 MG/L
MICROALBUMIN/CREAT UR: 5.83 MG/G CR (ref 0–17)
NONHDLC SERPL-MCNC: 160 MG/DL
POTASSIUM BLD-SCNC: 4.6 MMOL/L (ref 3.4–5.3)
PROT SERPL-MCNC: 7.5 G/DL (ref 6.8–8.8)
SODIUM SERPL-SCNC: 135 MMOL/L (ref 133–144)
TRIGL SERPL-MCNC: 182 MG/DL

## 2021-10-07 NOTE — RESULT ENCOUNTER NOTE
Hello!  It was a pleasure, as always, to see you in clinic!  Thank you for getting labs done. Everything looks normal, which is good news.      The testing of your blood sugar, kidney function, liver function and electrolytes was normal.     Your microalbumen is normal, which is great news. This means you do not have protein in the urine, and that your kidneys are currently functioning well. Keeping blood pressure and blood fats low is the best way to preserve your kidney function over your lifetime.     Your cholesterol has creeped up a little. You could reconsider taking a daily aspirin if you don't have side effects from it, and you could consider starting a cholesterol lowering medication, as your 10 year risk of a heart attack or stroke is now more than 10%. Please let me know if you have any questions about this!    Desired or goal levels are:  CHOLESTEROL: Desirable is less than 200.  HDL (Good Cholesterol): Desirable is greater than 40 in men and greater than 50 in women.  LDL (Bad Cholesterol): Desirable is less than 130 or less than 100 in patients with diabetes or vascular disease. For some patients with diabetes or vascular disease, the desireable LDL is less that 70.  TRIGLYCERIDES: Desirable is less than 150.    As you may know, abnormal cholesterol is one factor that increases your risk for heart disease and stroke. You can improve your cholesterol by controlling the amount and type of fat you eat and by increasing your daily activity level.    Here are some ways to improve your nutrition (adapted from the American Academy of Family Practice handout):  Eat less fat (especially butter, Crisco and other saturated fats)  Buy lean cuts of meat; reduce your portions of red meat or substitute poultry or fish  Use skim milk and low-fat dairy products  Eat no more than 4 egg yolks per week  Avoid fried or fast foods that are high in fat  Eat more fruits and vegetables    If you have any questions, please  contact the clinic or schedule an appointment with me, thank you!      Sincerely,  Dr. Gali Kennedy MD  10/6/2021

## 2021-10-13 DIAGNOSIS — I10 HYPERTENSION GOAL BP (BLOOD PRESSURE) < 150/90: ICD-10-CM

## 2021-10-15 RX ORDER — AMLODIPINE BESYLATE 2.5 MG/1
TABLET ORAL
Qty: 90 TABLET | Refills: 1 | Status: SHIPPED | OUTPATIENT
Start: 2021-10-15 | End: 2022-04-21

## 2021-10-15 NOTE — TELEPHONE ENCOUNTER
Calcium Channel Blockers Protocol [id number]/13/2021 12:28 AM   Blood pressure under 140/90 in past 12 months Protocol Details    Recent (12 mo) or future (30 days) visit within the authorizing provider's specialty     Medication is active on med list     Patient is age 18 or older     Normal serum creatinine on file in past 12 months

## 2022-01-11 ENCOUNTER — OFFICE VISIT (OUTPATIENT)
Dept: FAMILY MEDICINE | Facility: CLINIC | Age: 67
End: 2022-01-11
Payer: COMMERCIAL

## 2022-01-11 VITALS
WEIGHT: 148 LBS | RESPIRATION RATE: 16 BRPM | SYSTOLIC BLOOD PRESSURE: 107 MMHG | DIASTOLIC BLOOD PRESSURE: 73 MMHG | HEART RATE: 98 BPM | BODY MASS INDEX: 26.43 KG/M2 | TEMPERATURE: 97.7 F | OXYGEN SATURATION: 94 %

## 2022-01-11 DIAGNOSIS — J40 BRONCHITIS: Primary | ICD-10-CM

## 2022-01-11 PROCEDURE — 99213 OFFICE O/P EST LOW 20 MIN: CPT | Performed by: NURSE PRACTITIONER

## 2022-01-11 RX ORDER — BENZONATATE 200 MG/1
200 CAPSULE ORAL 3 TIMES DAILY PRN
Qty: 30 CAPSULE | Refills: 0 | Status: SHIPPED | OUTPATIENT
Start: 2022-01-11 | End: 2023-12-04

## 2022-01-11 RX ORDER — GUAIFENESIN 600 MG/1
1200 TABLET, EXTENDED RELEASE ORAL 2 TIMES DAILY
COMMUNITY
End: 2023-12-04

## 2022-01-11 RX ORDER — ALBUTEROL SULFATE 90 UG/1
2 AEROSOL, METERED RESPIRATORY (INHALATION) EVERY 6 HOURS
Qty: 18 G | Refills: 0 | Status: SHIPPED | OUTPATIENT
Start: 2022-01-11 | End: 2023-02-10

## 2022-01-11 ASSESSMENT — LIFESTYLE VARIABLES: SMOKING_STATUS: 0

## 2022-01-11 ASSESSMENT — ENCOUNTER SYMPTOMS: COUGH: 1

## 2022-01-11 NOTE — PROGRESS NOTES
{PROVIDER CHARTING PREFERENCE:024392}    Subjective   Roderick is a 66 year old who presents for the following health issues {ACCOMPANIED BY STATEMENT (Optional):583929}    Cough  This is a recurrent problem. The current episode started more than 1 week ago. The problem occurs constantly. The problem has been gradually improving. The cough is productive of sputum. There has been no fever. He has tried decongestants and cough syrup for the symptoms. The treatment provided mild relief. He is not a smoker.        {SUPERLIST (Optional):625290}  {additonal problems for provider to add (Optional):918580}    Review of Systems   Respiratory: Positive for cough.       {ROS COMP (Optional):751034}      Objective    There were no vitals taken for this visit.  There is no height or weight on file to calculate BMI.  Physical Exam   {Exam List (Optional):034298}    {Diagnostic Test Results (Optional):382841}    {AMBULATORY ATTESTATION (Optional):204226}

## 2022-01-11 NOTE — PATIENT INSTRUCTIONS
Patient Education     Acute Bronchitis  Your healthcare provider has told you that you have acute bronchitis. Bronchitis is infection or inflammation of the airways in the lungs (bronchial tubes). Normally, air moves easily in and out of the airways. Bronchitis narrows the airways. This makes it harder for air to flow in and out of the lungs. This causes symptoms such as shortness of breath, coughing up yellow or green mucus, and wheezing.  Bronchitis can be acute or chronic. Acute means it happens quickly and goes away in a short time. Chronic means a condition lasts a long time and often comes back. Most people with acute bronchitis get better in 1 to 2 weeks.     What causes acute bronchitis?  Acute bronchitis is often caused by a virus such as a cold or the flu. In some cases, it may be caused by bacteria. Certain factors make it more likely for a cold or flu to turn into bronchitis. These include being very young, being elderly, having a heart or lung problem, or having a weak immune system. Cigarette smoking also makes bronchitis more likely.  When bronchitis develops, the airways become swollen. The airways may also become infected with bacteria. This is known as a secondary infection.  Symptoms of acute bronchitis  Symptoms can include:    Coughing with mucus    Wheezing    Feeling short of breath    Chest pain    Fever  Diagnosing acute bronchitis  Your healthcare provider will ask about your symptoms and health history. He or she will give you a physical exam. This will include listening to your lungs while you breathe. You may have a chest X-ray to look for infection in the lungs (pneumonia) if you have had a fever. You may also have blood tests to check for infection.  Treating acute bronchitis  Bronchitis usually goes away in 1 to 2 weeks without treatment. You can help feel better by:    Taking medicine as directed. Talk to your healthcare provider before taking any over-the-counter medicines (OTC).  Some OTC medicines help relieve inflammation in your bronchial tubes. They can also thin mucus. This makes it easier to cough up. Your healthcare provider may prescribe an inhaler to help open up the bronchial tubes. Most of the time, acute bronchitis is caused by a viral infection. Antibiotics are usually not prescribed for viral infections.    Drinking plenty of fluids, such as water, juice, or warm soup. Fluids loosen mucus so that you can cough it up. This helps you breathe more easily. Fluids also prevent dehydration.    Using a humidifier. This can help reduce coughing.    Getting plenty of rest    Not smoking. Also, don't let anyone else smoke in your home. In public places, move away from secondhand smoke.  Recovery and follow-up  Follow up with your doctor. You will likely feel better in 1 to 2 weeks. But you may have a dry cough for a longer time. Let your doctor know if you still have symptoms other than a dry cough after 2 weeks. Tell him or her if you get bronchial infections often.  Self-care tips  To get relief from your symptoms and prevent bronchitis:    Stop smoking. Stopping smoking is the most important step you can take to treat bronchitis. If you need help stopping smoking, talk with your healthcare provider.    Stay away from secondhand smoke and other irritants. Try to stay away from smoke, chemicals, fumes, and dust. Don t let anyone smoke in your home. Stay indoors on smoggy days.    Prevent lung infections. Ask your healthcare provider about the flu and pneumonia vaccines. Take steps to prevent colds and other lung infections.    Wash your hands well. Wash your hands often with soap and water. Use hand  when you can t wash your hands. Stay away from crowds during cold and flu season.  When to call your healthcare provider  Call the healthcare provider if you have any of these:    Fever of 100.4 F ( 38.0 C) or higher, or as directed by your healthcare provider    Symptoms that get  worse, or new symptoms    Breathing not getting better with treatments    Symptoms that don t start to get better in 1 week  [employer] last reviewed this educational content on 8/1/2019 2000-2021 [employer]. All rights reserved. This information is not intended as a substitute for professional medical care. Always follow your healthcare professional's instructions.

## 2022-01-11 NOTE — PROGRESS NOTES
Assessment & Plan     Bronchitis  Improving.  Low suspicion for COVID,  Flu, or bacterial infection.   Symptoms seem most consistent with viral bronchitis.  Advised cough suppressant, inhaler, humidifier, and steam treatments.   - benzonatate (TESSALON) 200 MG capsule; Take 1 capsule (200 mg) by mouth 3 times daily as needed for cough  - albuterol (PROAIR HFA/PROVENTIL HFA/VENTOLIN HFA) 108 (90 Base) MCG/ACT inhaler; Inhale 2 puffs into the lungs every 6 hours        Return for Follow up if symptoms worsen or fail to improve.    CHRISTIAN Mandujano CNP  [facility]    Belinda Vaughn is a 66 year old who presents for the following health issues     HPI     Acute Illness  Acute illness concerns: cough  Onset/Duration: 1month  Symptoms:  Fever: no  Chills/Sweats: no  Headache (location?): no  Sinus Pressure: YES  Conjunctivitis:  YES  Ear Pain: no  Rhinorrhea: YES  Congestion: YES  Sore Throat: no  Cough: YES-barking, before productive, now clear.  Wheeze: no  Decreased Appetite: no  Nausea: no  Vomiting: no  Diarrhea: no  Dysuria/Freq.: no  Dysuria or Hematuria: no  Fatigue/Achiness: no  Sick/Strep Exposure: no  Therapies tried and outcome: Mucinex    Caring for son who had bad COVID.  Had recent head cold.  Multiple negative COVID test.  Last was 2 weeks ago.  Starting to feel better.  Son has been negative. No loss of smell or taste.      Review of Systems   Constitutional, HEENT, cardiovascular, pulmonary, gi and gu systems are negative, except as otherwise noted.      Objective    /73   Pulse 98   Temp 97.7  F (36.5  C) (Temporal)   Resp 16   Wt 67.1 kg (148 lb)   SpO2 94%   BMI 26.43 kg/m    Body mass index is 26.43 kg/m .  Physical Exam   GENERAL: healthy, alert and no distress  EYES: Eyes grossly normal to inspection, PERRL and conjunctivae and sclerae normal  HENT: ear canals and TM's normal, nose and mouth without ulcers or lesions  NECK: no adenopathy, no  asymmetry, masses, or scars and thyroid normal to palpation  RESP: lungs clear to auscultation - no rales, rhonchi or wheezes  CV: regular rate and rhythm, normal S1 S2, no S3 or S4, no murmur, click or rub, no peripheral edema and peripheral pulses strong  MS: no gross musculoskeletal defects noted, no edema

## 2022-10-19 DIAGNOSIS — I10 HYPERTENSION GOAL BP (BLOOD PRESSURE) < 150/90: ICD-10-CM

## 2022-10-20 RX ORDER — AMLODIPINE BESYLATE 2.5 MG/1
TABLET ORAL
Qty: 90 TABLET | Refills: 0 | Status: SHIPPED | OUTPATIENT
Start: 2022-10-20 | End: 2023-01-17

## 2022-10-20 NOTE — TELEPHONE ENCOUNTER
Team Coordinators/MA-Please send letter to patient to schedule Medicare Annual Wellness visit.    Prescription approved per [facility] Refill Protocol.    Thank you!  CHELY OliverN, RN-[facility]

## 2022-12-17 DIAGNOSIS — I10 HYPERTENSION GOAL BP (BLOOD PRESSURE) < 150/90: ICD-10-CM

## 2022-12-17 RX ORDER — LISINOPRIL 20 MG/1
TABLET ORAL
Qty: 90 TABLET | Refills: 0 | Status: SHIPPED | OUTPATIENT
Start: 2022-12-17 | End: 2023-03-21

## 2022-12-17 NOTE — TELEPHONE ENCOUNTER
Patient is scheduled with Dr. Kennedy on 2/10/23.    Prescription approved per [facility] Refill Protocol.    CHELY OliverN, RN-[facility]

## 2023-01-16 DIAGNOSIS — I10 HYPERTENSION GOAL BP (BLOOD PRESSURE) < 150/90: ICD-10-CM

## 2023-01-17 RX ORDER — AMLODIPINE BESYLATE 2.5 MG/1
TABLET ORAL
Qty: 90 TABLET | Refills: 0 | Status: SHIPPED | OUTPATIENT
Start: 2023-01-17 | End: 2023-04-20

## 2023-02-10 ENCOUNTER — OFFICE VISIT (OUTPATIENT)
Dept: FAMILY MEDICINE | Facility: CLINIC | Age: 68
End: 2023-02-10
Payer: COMMERCIAL

## 2023-02-10 VITALS
HEIGHT: 64 IN | HEART RATE: 85 BPM | SYSTOLIC BLOOD PRESSURE: 103 MMHG | BODY MASS INDEX: 24.59 KG/M2 | TEMPERATURE: 97.9 F | OXYGEN SATURATION: 95 % | DIASTOLIC BLOOD PRESSURE: 72 MMHG | WEIGHT: 144 LBS | RESPIRATION RATE: 15 BRPM

## 2023-02-10 DIAGNOSIS — Z00.00 ENCOUNTER FOR MEDICARE ANNUAL WELLNESS EXAM: Primary | ICD-10-CM

## 2023-02-10 DIAGNOSIS — Z13.6 CARDIOVASCULAR SCREENING; LDL GOAL LESS THAN 160: ICD-10-CM

## 2023-02-10 DIAGNOSIS — Z13.220 SCREENING FOR HYPERLIPIDEMIA: ICD-10-CM

## 2023-02-10 DIAGNOSIS — I10 HYPERTENSION GOAL BP (BLOOD PRESSURE) < 150/90: ICD-10-CM

## 2023-02-10 DIAGNOSIS — Z12.5 SCREENING FOR PROSTATE CANCER: ICD-10-CM

## 2023-02-10 LAB
ANION GAP SERPL CALCULATED.3IONS-SCNC: 10 MMOL/L (ref 7–15)
BUN SERPL-MCNC: 24.8 MG/DL (ref 8–23)
CALCIUM SERPL-MCNC: 9.4 MG/DL (ref 8.8–10.2)
CHLORIDE SERPL-SCNC: 102 MMOL/L (ref 98–107)
CHOLEST SERPL-MCNC: 207 MG/DL
CREAT SERPL-MCNC: 0.82 MG/DL (ref 0.67–1.17)
CREAT UR-MCNC: 111 MG/DL
DEPRECATED HCO3 PLAS-SCNC: 26 MMOL/L (ref 22–29)
GFR SERPL CREATININE-BSD FRML MDRD: >90 ML/MIN/1.73M2
GLUCOSE SERPL-MCNC: 102 MG/DL (ref 70–99)
HDLC SERPL-MCNC: 44 MG/DL
LDLC SERPL CALC-MCNC: 132 MG/DL
MICROALBUMIN UR-MCNC: <12 MG/L
MICROALBUMIN/CREAT UR: NORMAL MG/G{CREAT}
NONHDLC SERPL-MCNC: 163 MG/DL
POTASSIUM SERPL-SCNC: 4.7 MMOL/L (ref 3.4–5.3)
PSA SERPL-MCNC: 2.06 NG/ML (ref 0–4.5)
SODIUM SERPL-SCNC: 138 MMOL/L (ref 136–145)
TRIGL SERPL-MCNC: 153 MG/DL

## 2023-02-10 PROCEDURE — 90677 PCV20 VACCINE IM: CPT | Performed by: FAMILY MEDICINE

## 2023-02-10 PROCEDURE — 80061 LIPID PANEL: CPT | Performed by: FAMILY MEDICINE

## 2023-02-10 PROCEDURE — G0009 ADMIN PNEUMOCOCCAL VACCINE: HCPCS | Performed by: FAMILY MEDICINE

## 2023-02-10 PROCEDURE — G0103 PSA SCREENING: HCPCS | Performed by: FAMILY MEDICINE

## 2023-02-10 PROCEDURE — 82043 UR ALBUMIN QUANTITATIVE: CPT | Performed by: FAMILY MEDICINE

## 2023-02-10 PROCEDURE — 80048 BASIC METABOLIC PNL TOTAL CA: CPT | Performed by: FAMILY MEDICINE

## 2023-02-10 PROCEDURE — G0439 PPPS, SUBSEQ VISIT: HCPCS | Performed by: FAMILY MEDICINE

## 2023-02-10 PROCEDURE — 82570 ASSAY OF URINE CREATININE: CPT | Performed by: FAMILY MEDICINE

## 2023-02-10 PROCEDURE — 36415 COLL VENOUS BLD VENIPUNCTURE: CPT | Performed by: FAMILY MEDICINE

## 2023-02-10 RX ORDER — ASPIRIN 81 MG/1
81 TABLET, CHEWABLE ORAL
COMMUNITY

## 2023-02-10 ASSESSMENT — ENCOUNTER SYMPTOMS
DYSURIA: 0
CONSTIPATION: 0
JOINT SWELLING: 0
HEADACHES: 0
SHORTNESS OF BREATH: 1
FEVER: 0
ABDOMINAL PAIN: 0
CHILLS: 0
NAUSEA: 0
PARESTHESIAS: 0
WEAKNESS: 0
HEMATOCHEZIA: 0
DIARRHEA: 0
FREQUENCY: 1
NERVOUS/ANXIOUS: 0
HEARTBURN: 0
HEMATURIA: 0
DIZZINESS: 0
SORE THROAT: 0
ARTHRALGIAS: 1
COUGH: 1
PALPITATIONS: 0
EYE PAIN: 0
MYALGIAS: 1

## 2023-02-10 ASSESSMENT — PAIN SCALES - GENERAL: PAINLEVEL: MODERATE PAIN (5)

## 2023-02-10 ASSESSMENT — ACTIVITIES OF DAILY LIVING (ADL): CURRENT_FUNCTION: NO ASSISTANCE NEEDED

## 2023-02-10 NOTE — PATIENT INSTRUCTIONS
Shingles vaccine  I strongly recommend getting the shingles vaccine (Shingrix) if you are over age 50, but please check with your insurance to see how much you might have to pay for this vaccine! If you are on most insurance plans including Medicare, it's covered if you get it at a pharmacy but not in a clinic.    This shot will prevent shingles, a painful skin rash that you are at risk for getting if you have ever had chicken pox. Sometimes the pain will last the rest of your life, even after the rash has healed, and there is not much that we can do to relieve the pain. The vaccine is 98% effective at preventing shingles.    Please consider getting this vaccine! You'll need #2 vaccines 2-4 months apart to be protected.     Preventing Falls at Home  A person can fall for many reasons. Older adults may fall because reaction time slows down as we age. Your muscles and joints may get stiff, weak, or less flexible because of illness, medicines, or a physical condition.   Other health problems that make falls more likely include:   Arthritis  Dizziness or lightheadedness when you stand up (orthostatic hypotension)  History of a stroke  Dizziness  Anemia  Certain medicines taken for mental illness or to control blood pressure.  Problems with balance or gait  Bladder or urinary problems  History of falling  Changes in vision (vision impairment)  Changes in thinking skills and memory (cognitive impairment)  Injuries from a fall can include serious injuries such as broken bones, dislocated joints, internal bleeding and cuts. Injuries like these can limit your independence.   Prevention tips  To help prevent falls and fall-related injuries, follow the tips below.    Floors  To make floors safer:   Put nonskid pads under area rugs.  Remove small rugs.  Replace worn floor coverings.  Tack carpets firmly to each step on carpeted stairs. Put nonskid strips on the edges of uncarpeted stairs.  Keep floors and stairs free of clutter  and cords.  Arrange furniture so there are clear pathways.  Clean up any spills right away.  Bathrooms    To make bathrooms safer:   Install grab bars in the tub or shower.  Apply nonskid strips or put a nonskid rubber mat in the tub or shower.  Sit on a bath chair to bathe.  Use bathmats with nonskid backing.  Lighting  To improve visibility in your home:    Keep a flashlight in each room. Or put a lamp next to the bed within easy reach.  Put nightlights in the bedrooms, hallways, kitchen, and bathrooms.  Make sure all stairways have good lighting.  Take your time when going up and down stairs.  Put handrails on both sides of stairs and in walkways for more support. To prevent injury to your wrist or arm, don t use handrails to pull yourself up.  Install grab bars to pull yourself up.  Move or rearrange items that you use often. This will make them easier to find or reach.  Look at your home to find any safety hazards. Especially look at doorways, walkways, and the driveway. Remove or repair any safety problems that you find.  Other changes to make  Look around to find any safety hazards. Look closely at doorways, walkways, and the driveway. Remove or repair any safety problems that you find.  Wear shoes that fit well.  Take your time when going up and down stairs.  Put handrails on both sides of stairs and in walkways for more support. To prevent injury to your wrist or arm, don t use handrails to pull yourself up.  Install grab bars wherever needed to pull yourself up.  Arrange items that you use often. This will make them easier to find or reach.    [employer] last reviewed this educational content on 3/1/2020    9675-2737 [employer]. All rights reserved. This information is not intended as a substitute for professional medical care. Always follow your healthcare professional's instructions.           Patient Education   Personalized Prevention Plan  You are due for the preventive services outlined  below.  Your care team is available to assist you in scheduling these services.  If you have already completed any of these items, please share that information with your care team to update in your medical record.  Health Maintenance Due   Topic Date Due    Zoster (Shingles) Vaccine (2 of 3) 10/06/2017    AORTIC ANEURYSM SCREENING (SYSTEM ASSIGNED)  Never done    Basic Metabolic Panel  10/01/2022    Cholesterol Lab  10/01/2022    Kidney Microalbumin Urine Test  10/01/2022    ANNUAL REVIEW OF HM ORDERS  10/01/2022    Pneumococcal Vaccine (2 - PCV) 10/01/2022     Preventive Health Recommendations  See your health care provider every year to  Review health changes.   Discuss preventive care.    Review your medicines if your doctor has prescribed any.  Talk with your health care provider about whether you should have a test to screen for prostate cancer (PSA).  Every 3 years, have a diabetes test (fasting glucose). If you are at risk for diabetes, you should have this test more often.  Every 5 years, have a cholesterol test. Have this test more often if you are at risk for high cholesterol or heart disease.   Every 10 years, have a colonoscopy. Or, have a yearly FIT test (stool test). These exams will check for colon cancer.  Talk to with your health care provider about screening for Abdominal Aortic Aneurysm if you have a family history of AAA or have a history of smoking.    Shots:   Get a flu shot each year.   Get a tetanus shot every 10 years.   Talk to your doctor about your pneumonia vaccines. There are now two you should receive - Pneumovax (PPSV 23) and Prevnar (PCV 13).  Talk to your pharmacist about a shingles vaccine.   Talk to your doctor about the hepatitis B vaccine.    Nutrition:   Eat at least 5 servings of fruits and vegetables each day.   Eat whole-grain bread, whole-wheat pasta and brown rice instead of white grains and rice.   Get adequate Calcium and Vitamin D.     Lifestyle  Exercise for at least  150 minutes a week (30 minutes a day, 5 days a week). This will help you control your weight and prevent disease.   Limit alcohol to one drink per day.   No smoking.   Wear sunscreen to prevent skin cancer.   See your dentist every six months for an exam and cleaning.   See your eye doctor every 1 to 2 years to screen for conditions such as glaucoma, macular degeneration and cataracts.    Personalized Prevention Plan  You are due for the preventive services outlined below.  Your care team is available to assist you in scheduling these services.  If you have already completed any of these items, please share that information with your care team to update in your medical record.  Health Maintenance   Topic Date Due    ZOSTER IMMUNIZATION (2 of 3) 10/06/2017    AORTIC ANEURYSM SCREENING (SYSTEM ASSIGNED)  Never done    BMP  10/01/2022    LIPID  10/01/2022    MICROALBUMIN  10/01/2022    ANNUAL REVIEW OF HM ORDERS  10/01/2022    Pneumococcal Vaccine: 65+ Years (2 - PCV) 10/01/2022    MEDICARE ANNUAL WELLNESS VISIT  02/10/2024    FALL RISK ASSESSMENT  02/10/2024    DTAP/TDAP/TD IMMUNIZATION (2 - Td or Tdap) 02/02/2026    ADVANCE CARE PLANNING  02/10/2028    COLORECTAL CANCER SCREENING  12/14/2028    HEPATITIS C SCREENING  Completed    PHQ-2 (once per calendar year)  Completed    INFLUENZA VACCINE  Completed    COVID-19 Vaccine  Completed    IPV IMMUNIZATION  Aged Out    MENINGITIS IMMUNIZATION  Aged Out       Exercise for a Healthier Heart  You may wonder how you can improve the health of your heart. If you re thinking about exercise, you re on the right track. You don t need to become an athlete. But you do need a certain amount of brisk exercise to help strengthen your heart. If you have been diagnosed with a heart condition, your healthcare provider may advise exercise to help stabilize your condition. To help make exercise a habit, choose safe, fun activities.      Exercise with a friend. When activity is fun, you're  more likely to stick with it.   Before you start  Check with your healthcare provider before starting an exercise program. This is especially important if you have not been active for a while. It's also important if you have a long-term (chronic) health problem such as heart disease, diabetes, or obesity. Or if you are at high risk for having these problems.   Why exercise?  Exercising regularly offers many healthy rewards. It can help you do all of the following:   Improve your blood cholesterol level to help prevent further heart trouble  Lower your blood pressure to help prevent a stroke or heart attack  Control diabetes, or reduce your risk of getting this disease  Improve your heart and lung function  Reach and stay at a healthy weight  Make your muscles stronger so you can stay active  Prevent falls and fractures by slowing the loss of bone mass (osteoporosis)  Manage stress better  Reduce your blood pressure  Improve your sense of self and your body image  Exercise tips    Ease into your routine. Set small goals. Then build on them. If you are not sure what your activity level should be, talk with your healthcare provider first before starting an exercise routine.  Exercise on most days. Aim for a total of 150 minutes (2 hours and 30 minutes) or more of moderate-intensity aerobic activity each week. Or 75 minutes (1 hour and 15 minutes) or more of vigorous-intensity aerobic activity each week. Or try for a combination of both. Moderate activity means that you breathe heavier and your heart rate increases but you can still talk. Think about doing 40 minutes of moderate exercise, 3 to 4 times a week. For best results, activity should last for about 40 minutes to lower blood pressure and cholesterol. It's OK to work up to the 40-minute period over time. Examples of moderate-intensity activity are walking 1 mile in 15 minutes. Or doing 30 to 45 minutes of yard work.  Step up your daily activity level.  Along with  your exercise program, try being more active the whole day. Walk instead of drive. Or park further away so that you take more steps each day. Do more household tasks or yard work. You may not be able to meet the advised mount of physical activity. But doing some moderate- or vigorous-intensity aerobic activity can help reduce your risk for heart disease. Your healthcare provider can help you figure out what is best for you.  Choose 1 or more activities you enjoy.  Walking is one of the easiest things you can do. You can also try swimming, riding a bike, dancing, or taking an exercise class.    When to call your healthcare provider  Call your healthcare provider if you have any of these:   Chest pain or feel dizzy or lightheaded  Burning, tightness, pressure, or heaviness in your chest, neck, shoulders, back, or arms  Abnormal shortness of breath  More joint or muscle pain  A very fast or irregular heartbeat (palpitations)  Linda last reviewed this educational content on 7/1/2019 2000-2021 [employer]. All rights reserved. This information is not intended as a substitute for professional medical care. Always follow your healthcare professional's instructions.

## 2023-02-10 NOTE — NURSING NOTE
Prior to immunization administration, verified patients identity using patient s name and date of birth. Please see Immunization Activity for additional information.     Screening Questionnaire for Adult Immunization    Are you sick today?   No   Do you have allergies to medications, food, a vaccine component or latex?   No   Have you ever had a serious reaction after receiving a vaccination?   No   Do you have a long-term health problem with heart, lung, kidney, or metabolic disease (e.g., diabetes), asthma, a blood disorder, no spleen, complement component deficiency, a cochlear implant, or a spinal fluid leak?  Are you on long-term aspirin therapy?   No   Do you have cancer, leukemia, HIV/AIDS, or any other immune system problem?   No   Do you have a parent, brother, or sister with an immune system problem?   No   In the past 3 months, have you taken medications that affect  your immune system, such as prednisone, other steroids, or anticancer drugs; drugs for the treatment of rheumatoid arthritis, Crohn s disease, or psoriasis; or have you had radiation treatments?   No   Have you had a seizure, or a brain or other nervous system problem?   No   During the past year, have you received a transfusion of blood or blood    products, or been given immune (gamma) globulin or antiviral drug?   No   For women: Are you pregnant or is there a chance you could become       pregnant during the next month?   No   Have you received any vaccinations in the past 4 weeks?   No     Immunization questionnaire answers were all negative.        Per orders of Dr. Raymond, injection of *PVS20 given by Bárbara Blair MA. Patient instructed to remain in clinic for 15 minutes afterwards, and to report any adverse reaction to me immediately.       Screening performed by Bárbara Blair MA on 2/10/2023 at 12:22 PM.

## 2023-02-10 NOTE — PROGRESS NOTES
"SUBJECTIVE:   Rodreick is a 67 year old who presents for Preventive Visit.  Patient has been advised of split billing requirements and indicates understanding: Yes  Are you in the first 12 months of your Medicare coverage?  No    Healthy Habits:     In general, how would you rate your overall health?  Good    Frequency of exercise:  1 day/week    Do you usually eat at least 4 servings of fruit and vegetables a day, include whole grains    & fiber and avoid regularly eating high fat or \"junk\" foods?  Yes    Taking medications regularly:  Yes    Medication side effects:  None    Ability to successfully perform activities of daily living:  No assistance needed    Home Safety:  No safety concerns identified    Hearing Impairment:  No hearing concerns    In the past 6 months, have you been bothered by leaking of urine?  No    In general, how would you rate your overall mental or emotional health?  Good      PHQ-2 Total Score: 0      Have you ever done Advance Care Planning? (For example, a Health Directive, POLST, or a discussion with a medical provider or your loved ones about your wishes): No, advance care planning information given to patient to review.  Patient plans to discuss their wishes with loved ones or provider.         Fall risk  Fallen 2 or more times in the past year?: Yes  Any fall with injury in the past year?: No    Cognitive Screening   1) Repeat 3 items (Leader, Season, Table)   2) Clock draw: NORMAL  3) 3 item recall: Recalls 3 objects  Results: 3 items recalled: COGNITIVE IMPAIRMENT LESS LIKELY    Mini-CogTM Copyright S Ashley. Licensed by the author for use in [facility]; reprinted with permission (cindy@.[email or web address]). All rights reserved.      Do you have sleep apnea, excessive snoring or daytime drowsiness?: Yes, daytime drowsiness    Reviewed and updated as needed this visit by clinical staff   Tobacco  Allergies  Meds  Problems             Reviewed and updated as needed this visit by " Provider     Meds  Problems            Social History     Tobacco Use     Smoking status: Never     Smokeless tobacco: Never   Substance Use Topics     Alcohol use: Yes     Alcohol/week: 1.0 standard drink     Types: 1 Standard drinks or equivalent per week     Comment: ocassional drink     If you drink alcohol do you typically have >3 drinks per day or >7 drinks per week? No    Alcohol Use 2/10/2023   Prescreen: >3 drinks/day or >7 drinks/week? Not Applicable   Prescreen: >3 drinks/day or >7 drinks/week? -   No flowsheet data found.      Current providers sharing in care for this patient include:   Patient Care Team:  Gali Kennedy MD as PCP - General (Family Practice)  Gali Kennedy MD as Assigned PCP    The following health maintenance items are reviewed in Epic and correct as of today:  Health Maintenance   Topic Date Due     ZOSTER IMMUNIZATION (2 of 3) 10/06/2017     AORTIC ANEURYSM SCREENING (SYSTEM ASSIGNED)  Never done     BMP  10/01/2022     LIPID  10/01/2022     MICROALBUMIN  10/01/2022     ANNUAL REVIEW OF HM ORDERS  10/01/2022     Pneumococcal Vaccine: 65+ Years (2 - PCV) 10/01/2022     MEDICARE ANNUAL WELLNESS VISIT  02/10/2024     FALL RISK ASSESSMENT  02/10/2024     DTAP/TDAP/TD IMMUNIZATION (2 - Td or Tdap) 02/02/2026     ADVANCE CARE PLANNING  02/10/2028     COLORECTAL CANCER SCREENING  12/14/2028     HEPATITIS C SCREENING  Completed     PHQ-2 (once per calendar year)  Completed     INFLUENZA VACCINE  Completed     COVID-19 Vaccine  Completed     IPV IMMUNIZATION  Aged Out     MENINGITIS IMMUNIZATION  Aged Out     BP Readings from Last 3 Encounters:   02/10/23 103/72   01/11/22 107/73   10/01/21 100/70    Wt Readings from Last 3 Encounters:   02/10/23 65.3 kg (144 lb)   01/11/22 67.1 kg (148 lb)   10/01/21 66.7 kg (147 lb 1.3 oz)                  Patient Active Problem List   Diagnosis     CARDIOVASCULAR SCREENING; LDL GOAL LESS THAN 160     Hypertension goal BP (blood  pressure) < 150/90     Right shoulder injury, subsequent encounter     Onychomycosis     Past Surgical History:   Procedure Laterality Date     COLONOSCOPY N/A 12/14/2018    Procedure: COLONOSCOPY;  Surgeon: Rere Calvo MD;  Location:  GI     VASECTOMY  1986    uncomplicated       Social History     Tobacco Use     Smoking status: Never     Smokeless tobacco: Never   Substance Use Topics     Alcohol use: Yes     Alcohol/week: 1.0 standard drink     Types: 1 Standard drinks or equivalent per week     Comment: ocassional drink     Family History   Problem Relation Age of Onset     Colon Cancer Mother 88     Peptic Ulcer Disease Mother      Hypertension Father 98        d age 98 of stroke     Alzheimer Disease Father      Hyperlipidemia Father      Hypothyroidism Father      Kidney Disease Father          Current Outpatient Medications   Medication Sig Dispense Refill     amLODIPine (NORVASC) 2.5 MG tablet TAKE 1 TABLET BY MOUTH EVERY DAY 90 tablet 0     Ascorbic Acid (VITAMIN C PO) Take 1,000 mg by mouth daily       ASPIRIN PO Take 81 mg by mouth daily        benzonatate (TESSALON) 200 MG capsule Take 1 capsule (200 mg) by mouth 3 times daily as needed for cough 30 capsule 0     folic acid-vit B6-vit B12 (FOLGARD) 0.8-10-0.115 MG TABS per tablet Take by mouth daily       guaiFENesin (MUCINEX) 600 MG 12 hr tablet Take 1,200 mg by mouth 2 times daily       lisinopril (ZESTRIL) 20 MG tablet TAKE 1 TABLET BY MOUTH EVERY DAY 90 tablet 0     VITAMIN D, CHOLECALCIFEROL, PO Take 400 Units by mouth daily       aspirin (ASA) 81 MG chewable tablet Take 81 mg by mouth       Allergies   Allergen Reactions     Fentanyl Nausea     Recent Labs   Lab Test 10/01/21  0933 02/02/21  1348 12/10/20  1106 03/03/20  1258 03/06/17  1435 02/02/16  1220   *  --  121* 126*   < > 132*   HDL 42  --  40 48   < > 53   TRIG 182*  --  172* 128   < > 66   ALT 32 27  --   --   --  38   CR 0.83 0.78 0.74  --    < > 0.82   GFRESTIMATED  ">90 >90 >90  --    < > >90  Non  GFR Calc     GFRESTBLACK  --  >90 >90  --    < > >90   GFR Calc     POTASSIUM 4.6 4.4 4.2  --    < > 4.2    < > = values in this interval not displayed.      Review of Systems   Constitutional: Negative for chills and fever.   HENT: Positive for congestion. Negative for ear pain, hearing loss and sore throat.    Eyes: Negative for pain and visual disturbance.   Respiratory: Positive for cough and shortness of breath.    Cardiovascular: Negative for chest pain, palpitations and peripheral edema.   Gastrointestinal: Negative for abdominal pain, constipation, diarrhea, heartburn, hematochezia and nausea.   Genitourinary: Positive for frequency. Negative for dysuria, genital sores, hematuria, impotence, penile discharge and urgency.   Musculoskeletal: Positive for arthralgias and myalgias. Negative for joint swelling.   Skin: Negative for rash.   Neurological: Negative for dizziness, weakness, headaches and paresthesias.   Psychiatric/Behavioral: Negative for mood changes. The patient is not nervous/anxious.      OBJECTIVE:   /72 (BP Location: Left arm, Patient Position: Sitting, Cuff Size: Adult Regular)   Pulse 85   Temp 97.9  F (36.6  C) (Temporal)   Resp 15   Ht 1.613 m (5' 3.5\")   Wt 65.3 kg (144 lb)   SpO2 95%   BMI 25.11 kg/m   Estimated body mass index is 25.11 kg/m  as calculated from the following:    Height as of this encounter: 1.613 m (5' 3.5\").    Weight as of this encounter: 65.3 kg (144 lb).   Wt Readings from Last 4 Encounters:   02/10/23 65.3 kg (144 lb)   01/11/22 67.1 kg (148 lb)   10/01/21 66.7 kg (147 lb 1.3 oz)   02/02/21 66.6 kg (146 lb 12.8 oz)     Physical Exam  GENERAL: healthy, alert and no distress  EYES: Eyes grossly normal to inspection, PERRL and conjunctivae and sclerae normal  HENT: ear canals and TM's normal, nose and mouth without ulcers or lesions  NECK: no adenopathy, no asymmetry, masses, or scars and " thyroid normal to palpation  RESP: lungs clear to auscultation - no rales, rhonchi or wheezes  CV: regular rate and rhythm, normal S1 S2, no S3 or S4, no murmur, click or rub, no peripheral edema and peripheral pulses strong  MS: no gross musculoskeletal defects noted, no edema  SKIN: no suspicious lesions or rashes  NEURO: Normal strength and tone, mentation intact and speech normal  PSYCH: mentation appears normal, affect normal/bright    Recent Labs   Lab Test 10/01/21  0933 12/10/20  1106   CHOL 202* 195   HDL 42 40   * 121*   TRIG 182* 172*         ASSESSMENT / PLAN:   (Z00.00) Encounter for Medicare annual wellness exam  (primary encounter diagnosis)  Comment: routine    (I10) Hypertension goal BP (blood pressure) < 150/90  Comment:   BP Readings from Last 3 Encounters:   02/10/23 103/72   01/11/22 107/73   10/01/21 100/70    At goal  The current medical regimen is effective;  continue present plan and medications.      Plan: BASIC METABOLIC PANEL, Albumin Random Urine         Quantitative with Creat Ratio      (Z13.6) CARDIOVASCULAR SCREENING; LDL GOAL LESS THAN 160  Comment:   LDL Cholesterol Calculated   Date Value Ref Range Status   10/01/2021 124 (H) <=100 mg/dL Final   12/10/2020 121 (H) <100 mg/dL Final     Comment:     Above desirable:  100-129 mg/dl  Borderline High:  130-159 mg/dL  High:             160-189 mg/dL  Very high:       >189 mg/dl        Plan: Lipid panel reflex to direct LDL Non-fasting            (Z12.5) Screening for prostate cancer  Comment:   Plan: PSA, screen              Patient has been advised of split billing requirements and indicates understanding: Yes      COUNSELING:  Reviewed preventive health counseling, as reflected in patient instructions        He reports that he has never smoked. He has never used smokeless tobacco.      Appropriate preventive services were discussed with this patient, including applicable screening as appropriate for cardiovascular disease,  diabetes, osteopenia/osteoporosis, and glaucoma.  As appropriate for age/gender, discussed screening for colorectal cancer, prostate cancer, breast cancer, and cervical cancer. Checklist reviewing preventive services available has been given to the patient.    Reviewed patients plan of care and provided an AVS. The Basic Care Plan (routine screening as documented in Health Maintenance) for Micah meets the Care Plan requirement. This Care Plan has been established and reviewed with the Patient.          Gali Kennedy MD  [facility]    Identified Health Risks:    He is at risk for falling and has been provided with information to reduce the risk of falling at home.

## 2023-02-14 DIAGNOSIS — R73.01 IMPAIRED FASTING GLUCOSE: Primary | ICD-10-CM

## 2023-02-14 NOTE — RESULT ENCOUNTER NOTE
Thank you very much for getting labs done! It was a pleasure as always to see you in clinic!    Your PSA (prostate cancer blood test)  is normal.   Your microalbumen is normal, which is great news. This means you do not have protein in the urine, and that your kidneys are currently functioning well. Keeping blood pressure and blood fats low is the best way to preserve your kidney function over your lifetime.     Your fasting glucose test was very slightly elevated.  A normal value is under 100.  Next year we'll recheck the blood sugar and a more specific test called a HgA1C to check for pre-diabetes and diabetes. If you want you could schedule a lab test to check this whenever you'd like, or we can check it with the other labs next year.    The rest of the metabolic panel looks good!    Thank you for getting your cholesterol checked! Everything looks pretty good/close to goal.    Desired or goal levels are:  CHOLESTEROL: Desirable is less than 200.  HDL (Good Cholesterol): Desirable is greater than 40  LDL (Bad Cholesterol): Desirable is less than 130  TRIGLYCERIDES: Desirable is less than 150.    To keep triglycerides in check, reduce simple carbohydrates in your diet including sugar, juice, excess fruit, white bread, pasta, white rice and cereal in your diet. Increase exercise  and Omega 3 (fish oil supplements) with  Meals.    As you may know, abnormal cholesterol is one factor that increases your risk for heart disease and stroke. You can improve your cholesterol by controlling the amount and type of fat you eat and by increasing your daily activity level.    Here are some ways to improve your nutrition (adapted from the American Academy of [employer] handout):  Eat less fat (especially butter, Crisco and other saturated fats)  Buy lean cuts of meat; reduce your portions of red meat or substitute poultry or fish, or avoid meat altogether.  Use skim milk and low-fat dairy products  Eat no more than 4 egg  yolks per week  Avoid fried or fast foods that are high in fat  Eat more fruits and vegetables, trying to make your plate of food at least half non-starchy vegetables.     If you have any questions, please contact the clinic or schedule an appointment with me, thank you!      Sincerely,  Dr. Gali Kennedy MD  2/14/2023

## 2023-04-19 DIAGNOSIS — I10 HYPERTENSION GOAL BP (BLOOD PRESSURE) < 150/90: ICD-10-CM

## 2023-04-20 RX ORDER — AMLODIPINE BESYLATE 2.5 MG/1
TABLET ORAL
Qty: 90 TABLET | Refills: 2 | Status: SHIPPED | OUTPATIENT
Start: 2023-04-20 | End: 2024-01-22

## 2023-04-20 NOTE — TELEPHONE ENCOUNTER
---Prescription approved per [facility].       Darshana Roblero RN BSN     [email or web address]        --Last visit:  2/10/2023

## 2023-04-23 ENCOUNTER — HEALTH MAINTENANCE LETTER (OUTPATIENT)
Age: 68
End: 2023-04-23

## 2023-09-24 ENCOUNTER — HEALTH MAINTENANCE LETTER (OUTPATIENT)
Age: 68
End: 2023-09-24

## 2023-11-27 ENCOUNTER — NURSE TRIAGE (OUTPATIENT)
Dept: FAMILY MEDICINE | Facility: CLINIC | Age: 68
End: 2023-11-27
Payer: COMMERCIAL

## 2023-11-27 NOTE — TELEPHONE ENCOUNTER
Symptoms    Describe your symptoms: Congestion, cough    Any pain: No    How long have you been having symptoms: Few   months    Have you been seen for this:  No    Appointment offered?: No, was transferred from [facility] due to symptoms. Patient initially called to schedule annual visit, per patient    Triage offered?: Yes: Unavailable    Home remedies tried: N/A    Preferred Pharmacy:   [facility] [id number] [place] [address]  [address] [facility] [address]  Phone: [phone] Fax: [phone]      Could we send this information to you in [facility] or would you prefer to receive a phone call?:   Patient would prefer a phone call   Okay to leave a detailed message?: Yes at Home number on file [phone] (home)

## 2023-11-29 NOTE — TELEPHONE ENCOUNTER
Dr. Kennedy-Patient is not able to make approval required spot offered to him for 11/30/23.  Would you advise using same day hold with any available provider?  Patient requested appt next week due to his schedule.  Alternatively, patient stated he would be open to virtual visit with you and writer sees you have openings on 12/5/23.    Writer called patient:  Symptoms have persisted and duration is 1 month  Nagging cough  Productive-phlegm  No blood present but thick in consistency  COVID-19 tests have been negative  Afebrile  Cough does not keep him up at night; more cough while awake  No chest pain nor shortness of breath  No wheezing  A little runny nose  No diarrhea nor vomiting  No fainting/syncope  Feels a change in his chest with his coughing  No history of heart nor lung disease    Writer recommended evaluation within the next three days and offered appt with clinic provider on 11/30/23, which patient declined due to scheduling conflicts.  Discussed Urgent Care, or possibly virtual visit with Dr. Kennedy on 12/5/23.  Patient requested PCP input.  Informed patient will send message to PCP, who is in clinic tomorrow, 11/30/23.      Thank you!  CHELY OliverN, RN-[facility]    Reason for Disposition   Cough has been present for > 3 weeks    Additional Information   Negative: Bluish (or gray) lips or face   Negative: SEVERE difficulty breathing (e.g., struggling for each breath, speaks in single words)   Negative: Rapid onset of cough and has hives   Negative: Coughing started suddenly after medicine, an allergic food or bee sting   Negative: Difficulty breathing after exposure to flames, smoke, or fumes   Negative: Sounds like a life-threatening emergency to the triager   Negative: Previous asthma attacks and this feels like asthma attack   Negative: Dry cough (non-productive; no sputum or minimal clear sputum) and within 14 days of COVID-19 Exposure   Negative: MODERATE  difficulty breathing (e.g., speaks in phrases, SOB even at rest, pulse 100-120) and still present when not coughing   Negative: Chest pain present when not coughing   Negative: Passed out (i.e., fainted, collapsed and was not responding)   Negative: Patient sounds very sick or weak to the triager   Negative: MILD difficulty breathing (e.g., minimal/no SOB at rest, SOB with walking, pulse <100) and still present when not coughing   Negative: Coughed up > 1 tablespoon (15 ml) blood (Exception: Blood-tinged sputum.)   Negative: Fever > 103 F (39.4 C)   Negative: Fever > 101 F (38.3 C) and over 60 years of age   Negative: Fever > 100.0 F (37.8 C) and has diabetes mellitus or a weak immune system (e.g., HIV positive, cancer chemotherapy, organ transplant, splenectomy, chronic steroids)   Negative: Fever > 100.0 F (37.8 C) and bedridden (e.g., CVA, chronic illness, recovering from surgery)   Negative: Increasing ankle swelling   Negative: Wheezing is present   Negative: SEVERE coughing spells (e.g., whooping sound after coughing, vomiting after coughing)   Negative: Coughing up milton-colored (reddish-brown) or blood-tinged sputum   Negative: Fever present > 3 days (72 hours)   Negative: Fever returns after gone for over 24 hours and symptoms worse or not improved   Negative: Using nasal washes and pain medicine > 24 hours and sinus pain persists   Negative: Known COPD or other severe lung disease (i.e., bronchiectasis, cystic fibrosis, lung surgery) and worsening symptoms (i.e., increased sputum purulence or amount, increased breathing difficulty)   Negative: Continuous (nonstop) coughing interferes with work or school and no improvement using cough treatment per Care Advice   Negative: Patient wants to be seen    Protocols used: Cough-A-OH

## 2023-11-30 NOTE — TELEPHONE ENCOUNTER
Left detailed message for patient:  Dr. Kennedy recommends in-person evaluation; likely will need chest x-ray  Please call triage back so we can work on getting you scheduled within the next few days      CHELY OliverN, RN-[facility]

## 2023-11-30 NOTE — TELEPHONE ENCOUNTER
HI, I do think he needs an exam and probably a chest xray, so please either offer him a same day slot with any provider next week or an inperson appointment with me at 5:00 pm on Thursday, Dec 14th (my virtual can be changed to inperson). Thank you!    Sincerely,  Dr. Gali Kennedy MD  11/30/2023

## 2023-12-01 NOTE — TELEPHONE ENCOUNTER
Patient calling back and scheduled in same day slot for Monday, Dec 4. Educated to next level of care regarding worsening symptoms.    Laura Benitez, RN, BSN, PHN  [facility]  [phone]

## 2023-12-04 ENCOUNTER — ANCILLARY PROCEDURE (OUTPATIENT)
Dept: GENERAL RADIOLOGY | Facility: CLINIC | Age: 68
End: 2023-12-04
Attending: NURSE PRACTITIONER
Payer: COMMERCIAL

## 2023-12-04 ENCOUNTER — OFFICE VISIT (OUTPATIENT)
Dept: FAMILY MEDICINE | Facility: CLINIC | Age: 68
End: 2023-12-04
Payer: COMMERCIAL

## 2023-12-04 VITALS
TEMPERATURE: 98 F | HEART RATE: 89 BPM | HEIGHT: 62 IN | SYSTOLIC BLOOD PRESSURE: 102 MMHG | OXYGEN SATURATION: 96 % | BODY MASS INDEX: 26.83 KG/M2 | RESPIRATION RATE: 16 BRPM | DIASTOLIC BLOOD PRESSURE: 72 MMHG | WEIGHT: 145.8 LBS

## 2023-12-04 DIAGNOSIS — R05.9 COUGH, UNSPECIFIED TYPE: Primary | ICD-10-CM

## 2023-12-04 DIAGNOSIS — R05.9 COUGH, UNSPECIFIED TYPE: ICD-10-CM

## 2023-12-04 PROCEDURE — 71046 X-RAY EXAM CHEST 2 VIEWS: CPT | Mod: TC | Performed by: RADIOLOGY

## 2023-12-04 PROCEDURE — 99214 OFFICE O/P EST MOD 30 MIN: CPT | Performed by: NURSE PRACTITIONER

## 2023-12-04 ASSESSMENT — ENCOUNTER SYMPTOMS: COUGH: 1

## 2023-12-04 ASSESSMENT — PAIN SCALES - GENERAL: PAINLEVEL: NO PAIN (0)

## 2023-12-04 NOTE — PROGRESS NOTES
"  Assessment & Plan     (R05.9) Cough, unspecified type  (primary encounter diagnosis)  Comment: post-viral cough vs allergies  Plan: XR Chest 2 Views        Chest xray is negative per my read.   Discussed taking an antihistamine like loratadine daily, as this has seemed to help when he's taken it.    If symptoms are not improving or worsen, return to clinic.       I spent a total of 32 minutes on the day of the visit.   Time spent by me doing chart review, history and exam, documentation and further activities per the note       BMI:   Estimated body mass index is 26.29 kg/m  as calculated from the following:    Height as of this encounter: 1.586 m (5' 2.44\").    Weight as of this encounter: 66.1 kg (145 lb 12.8 oz).           Jacque Lai NP  [facility]    Belinda Vaughn is a 68 year old, presenting for the following health issues:  Cough (Ongoing for a month )        12/4/2023     1:36 PM   Additional Questions   Roomed by Faustina   Accompanied by Self         12/4/2023     1:36 PM   Patient Reported Additional Medications   Patient reports taking the following new medications None       Cough    History of Present Illness       Reason for visit:  Coigh and congestion  Symptom onset:  3-4 weeks ago  Symptoms include:  Consistrnt wet cough  Symptom intensity:  Moderate  Symptom progression:  Staying the same  Had these symptoms before:  No    He eats 2-3 servings of fruits and vegetables daily.He consumes 0 sweetened beverage(s) daily.He exercises with enough effort to increase his heart rate 10 to 19 minutes per day.  He exercises with enough effort to increase his heart rate 5 days per week.   He is taking medications regularly.     History as copied from triage note last week:  Symptoms have persisted and duration is 1 month  Nagging cough  Productive-phlegm  No blood present but thick in consistency  COVID-19 tests have been negative  Afebrile  Cough does not keep him up at " "night; more cough while awake  No chest pain nor shortness of breath  No wheezing  A little runny nose  No diarrhea nor vomiting  No fainting/syncope  Feels a change in his chest with his coughing  No history of heart nor lung disease    Some nasal congestion.  No sinus pressure, fevers or wheezing.  Mild shortness of breath, worse when he is at his daughter's house.  He did try an antihistamine, which helped.  He denies any edema.  He does get a cough every winter when he closes up his house and turns on his furnace, but this cough felt a bit different.  He is a never-smoker.                 Review of Systems   Respiratory:  Positive for cough.             Objective    /72 (BP Location: Right arm, Patient Position: Sitting, Cuff Size: Adult Regular)   Pulse 89   Temp 98  F (36.7  C) (Temporal)   Resp 16   Ht 1.586 m (5' 2.44\")   Wt 66.1 kg (145 lb 12.8 oz)   SpO2 96%   BMI 26.29 kg/m    Body mass index is 26.29 kg/m .  Physical Exam   GENERAL: healthy, alert and no distress  HENT: ear canals and TM's normal, nose and mouth without ulcers or lesions  NECK: no adenopathy, no asymmetry, masses, or scars and thyroid normal to palpation  RESP: lungs clear to auscultation - no rales, rhonchi or wheezes  CV: regular rate and rhythm, normal S1 S2, no S3 or S4, no murmur, click or rub, no peripheral edema and peripheral pulses strong  MS: no gross musculoskeletal defects noted, no edema  PSYCH: mentation appears normal, affect normal/bright  LYMPH: no cervical, supraclavicular adenopathy                      "

## 2024-01-20 DIAGNOSIS — I10 HYPERTENSION GOAL BP (BLOOD PRESSURE) < 150/90: ICD-10-CM

## 2024-01-22 RX ORDER — AMLODIPINE BESYLATE 2.5 MG/1
TABLET ORAL
Qty: 90 TABLET | Refills: 0 | Status: SHIPPED | OUTPATIENT
Start: 2024-01-22 | End: 2024-02-15

## 2024-02-11 ENCOUNTER — HEALTH MAINTENANCE LETTER (OUTPATIENT)
Age: 69
End: 2024-02-11

## 2024-02-15 ENCOUNTER — OFFICE VISIT (OUTPATIENT)
Dept: FAMILY MEDICINE | Facility: CLINIC | Age: 69
End: 2024-02-15
Payer: COMMERCIAL

## 2024-02-15 VITALS
WEIGHT: 138.9 LBS | RESPIRATION RATE: 16 BRPM | OXYGEN SATURATION: 100 % | HEART RATE: 124 BPM | DIASTOLIC BLOOD PRESSURE: 78 MMHG | SYSTOLIC BLOOD PRESSURE: 115 MMHG | HEIGHT: 62 IN | TEMPERATURE: 97.3 F | BODY MASS INDEX: 25.56 KG/M2

## 2024-02-15 DIAGNOSIS — Z13.6 CARDIOVASCULAR SCREENING; LDL GOAL LESS THAN 160: ICD-10-CM

## 2024-02-15 DIAGNOSIS — Z00.00 ENCOUNTER FOR MEDICARE ANNUAL WELLNESS EXAM: Primary | ICD-10-CM

## 2024-02-15 DIAGNOSIS — Z78.9 VEGAN DIET: ICD-10-CM

## 2024-02-15 DIAGNOSIS — Z12.5 SCREENING FOR PROSTATE CANCER: ICD-10-CM

## 2024-02-15 DIAGNOSIS — I10 HYPERTENSION GOAL BP (BLOOD PRESSURE) < 150/90: ICD-10-CM

## 2024-02-15 DIAGNOSIS — Z13.1 SCREENING FOR DIABETES MELLITUS: ICD-10-CM

## 2024-02-15 DIAGNOSIS — R73.01 IFG (IMPAIRED FASTING GLUCOSE): ICD-10-CM

## 2024-02-15 DIAGNOSIS — M65.30 TRIGGER FINGER, ACQUIRED: ICD-10-CM

## 2024-02-15 LAB
CREAT UR-MCNC: 219 MG/DL
FOLATE SERPL-MCNC: 10 NG/ML (ref 4.6–34.8)
HBA1C MFR BLD: 5.9 % (ref 0–5.6)
MICROALBUMIN UR-MCNC: <12 MG/L
MICROALBUMIN/CREAT UR: NORMAL MG/G{CREAT}

## 2024-02-15 PROCEDURE — 82607 VITAMIN B-12: CPT | Performed by: FAMILY MEDICINE

## 2024-02-15 PROCEDURE — G0103 PSA SCREENING: HCPCS | Performed by: FAMILY MEDICINE

## 2024-02-15 PROCEDURE — 80061 LIPID PANEL: CPT | Performed by: FAMILY MEDICINE

## 2024-02-15 PROCEDURE — 80048 BASIC METABOLIC PNL TOTAL CA: CPT | Performed by: FAMILY MEDICINE

## 2024-02-15 PROCEDURE — 99213 OFFICE O/P EST LOW 20 MIN: CPT | Mod: 25 | Performed by: FAMILY MEDICINE

## 2024-02-15 PROCEDURE — 82043 UR ALBUMIN QUANTITATIVE: CPT | Performed by: FAMILY MEDICINE

## 2024-02-15 PROCEDURE — 82570 ASSAY OF URINE CREATININE: CPT | Performed by: FAMILY MEDICINE

## 2024-02-15 PROCEDURE — G0439 PPPS, SUBSEQ VISIT: HCPCS | Performed by: FAMILY MEDICINE

## 2024-02-15 PROCEDURE — 83036 HEMOGLOBIN GLYCOSYLATED A1C: CPT | Performed by: FAMILY MEDICINE

## 2024-02-15 PROCEDURE — 82746 ASSAY OF FOLIC ACID SERUM: CPT | Performed by: FAMILY MEDICINE

## 2024-02-15 PROCEDURE — 36415 COLL VENOUS BLD VENIPUNCTURE: CPT | Performed by: FAMILY MEDICINE

## 2024-02-15 RX ORDER — RESPIRATORY SYNCYTIAL VIRUS VACCINE 120MCG/0.5
0.5 KIT INTRAMUSCULAR ONCE
Qty: 1 EACH | Refills: 0 | Status: CANCELLED | OUTPATIENT
Start: 2024-02-15 | End: 2024-02-15

## 2024-02-15 SDOH — HEALTH STABILITY: PHYSICAL HEALTH: ON AVERAGE, HOW MANY DAYS PER WEEK DO YOU ENGAGE IN MODERATE TO STRENUOUS EXERCISE (LIKE A BRISK WALK)?: 5 DAYS

## 2024-02-15 ASSESSMENT — PAIN SCALES - GENERAL: PAINLEVEL: NO PAIN (0)

## 2024-02-15 ASSESSMENT — SOCIAL DETERMINANTS OF HEALTH (SDOH): HOW OFTEN DO YOU GET TOGETHER WITH FRIENDS OR RELATIVES?: MORE THAN THREE TIMES A WEEK

## 2024-02-15 NOTE — PATIENT INSTRUCTIONS
Please check blood pressure at home, goal less than 130/80.    Preventive Care Advice   This is general advice given by our system to help you stay healthy. However, your care team may have specific advice just for you. Please talk to your care team about your preventive care needs.  Nutrition  Eat 5 or more servings of fruits and vegetables each day.  Try wheat bread, brown rice and whole grain pasta (instead of white bread, rice, and pasta).  Get enough calcium and vitamin D. Check the label on foods and aim for 100% of the RDA (recommended daily allowance).  Lifestyle  Exercise at least 150 minutes each week  (30 minutes a day, 5 days a week).  Do muscle strengthening activities 2 days a week. These help control your weight and prevent disease.  No smoking.  Wear sunscreen to prevent skin cancer.  Have a dental exam and cleaning every 6 months.  Yearly exams  See your health care team every year to talk about:  Any changes in your health.  Any medicines your care team has prescribed.  Preventive care, family planning, and ways to prevent chronic diseases.  Shots (vaccines)   HPV shots (up to age 26), if you've never had them before.  Hepatitis B shots (up to age 59), if you've never had them before.  COVID-19 shot: Get this shot when it's due.  Flu shot: Get a flu shot every year.  Tetanus shot: Get a tetanus shot every 10 years.  Pneumococcal, hepatitis A, and RSV shots: Ask your care team if you need these based on your risk.  Shingles shot (for age 50 and up)  General health tests  Diabetes screening:  Starting at age 35, Get screened for diabetes at least every 3 years.  If you are younger than age 35, ask your care team if you should be screened for diabetes.  Cholesterol test: At age 39, start having a cholesterol test every 5 years, or more often if advised.  Bone density scan (DEXA): At age 50, ask your care team if you should have this scan for osteoporosis (brittle bones).  Hepatitis C: Get tested at  least once in your life.  STIs (sexually transmitted infections)  Before age 24: Ask your care team if you should be screened for STIs.  After age 24: Get screened for STIs if you're at risk. You are at risk for STIs (including HIV) if:  You are sexually active with more than one person.  You don't use condoms every time.  You or a partner was diagnosed with a sexually transmitted infection.  If you are at risk for HIV, ask about PrEP medicine to prevent HIV.  Get tested for HIV at least once in your life, whether you are at risk for HIV or not.  Cancer screening tests  Cervical cancer screening: If you have a cervix, begin getting regular cervical cancer screening tests starting at age 21.  Breast cancer scan (mammogram): If you've ever had breasts, begin having regular mammograms starting at age 40. This is a scan to check for breast cancer.  Colon cancer screening: It is important to start screening for colon cancer at age 45.  Have a colonoscopy test every 10 years (or more often if you're at risk) Or, ask your provider about stool tests like a FIT test every year or Cologuard test every 3 years.  To learn more about your testing options, visit:   https://www.Uscreen.tv/775845.pdf.  For help making a decision, visit:   https://bit.ly/hf57633.  Prostate cancer screening test: If you have a prostate, ask your care team if a prostate cancer screening test (PSA) at age 55 is right for you.  Lung cancer screening: If you are a current or former smoker ages 50 to 80, ask your care team if ongoing lung cancer screenings are right for you.  For informational purposes only. Not to replace the advice of your health care provider. Copyright   2023 [employer]. All rights reserved. Clinically reviewed by the [facility] Transitions Program. [employer] 000572 - REV 01/24.    Hearing Loss: Care Instructions  Overview     Hearing loss is a sudden or slow decrease in how well you hear. It can range from  slight to profound. Permanent hearing loss can occur with aging. It also can happen when you are exposed long-term to loud noise. Examples include listening to loud music, riding motorcycles, or being around other loud machines.  Hearing loss can affect your work and home life. It can make you feel lonely or depressed. You may feel that you have lost your independence. But hearing aids and other devices can help you hear better and feel connected to others.  Follow-up care is a key part of your treatment and safety. Be sure to make and go to all appointments, and call your doctor if you are having problems. It's also a good idea to know your test results and keep a list of the medicines you take.  How can you care for yourself at home?  Avoid loud noises whenever possible. This helps keep your hearing from getting worse.  Always wear hearing protection around loud noises.  Wear a hearing aid as directed.  A professional can help you pick a hearing aid that will work best for you.  You can also get hearing aids over the counter for mild to moderate hearing loss.  Have hearing tests as your doctor suggests. They can show whether your hearing has changed. Your hearing aid may need to be adjusted.  Use other devices as needed. These may include:  Telephone amplifiers and hearing aids that can connect to a television, stereo, radio, or microphone.  Devices that use lights or vibrations. These alert you to the doorbell, a ringing telephone, or a baby monitor.  Television closed-captioning. This shows the words at the bottom of the screen. Most new TVs can do this.  TTY (text telephone). This lets you type messages back and forth on the telephone instead of talking or listening. These devices are also called TDD. When messages are typed on the keyboard, they are sent over the phone line to a receiving TTY. The message is shown on a monitor.  Use text messaging, social media, and email if it is hard for you to communicate  "by telephone.  Try to learn a listening technique called speechreading. It is not lipreading. You pay attention to people's gestures, expressions, posture, and tone of voice. These clues can help you understand what a person is saying. Face the person you are talking to, and have them face you. Make sure the lighting is good. You need to see the other person's face clearly.  Think about counseling if you need help to adjust to your hearing loss.  When should you call for help?  Watch closely for changes in your health, and be sure to contact your doctor if:    You think your hearing is getting worse.     You have new symptoms, such as dizziness or nausea.   Where can you learn more?  Go to https://www.Planspot.net/patiented  Enter R798 in the search box to learn more about \"Hearing Loss: Care Instructions.\"  Current as of: February 28, 2023               Content Version: 13.8    8651-0807 [employer].   Care instructions adapted under license by your healthcare professional. If you have questions about a medical condition or this instruction, always ask your healthcare professional. [employer] disclaims any warranty or liability for your use of this information.      Learning About Stress  What is stress?     Stress is your body's response to a hard situation. Your body can have a physical, emotional, or mental response. Stress is a fact of life for most people, and it affects everyone differently. What causes stress for you may not be stressful for someone else.  A lot of things can cause stress. You may feel stress when you go on a job interview, take a test, or run a race. This kind of short-term stress is normal and even useful. It can help you if you need to work hard or react quickly. For example, stress can help you finish an important job on time.  Long-term stress is caused by ongoing stressful situations or events. Examples of long-term stress include long-term health problems, " ongoing problems at work, or conflicts in your family. Long-term stress can harm your health.  How does stress affect your health?  When you are stressed, your body responds as though you are in danger. It makes hormones that speed up your heart, make you breathe faster, and give you a burst of energy. This is called the fight-or-flight stress response. If the stress is over quickly, your body goes back to normal and no harm is done.  But if stress happens too often or lasts too long, it can have bad effects. Long-term stress can make you more likely to get sick, and it can make symptoms of some diseases worse. If you tense up when you are stressed, you may develop neck, shoulder, or low back pain. Stress is linked to high blood pressure and heart disease.  Stress also harms your emotional health. It can make you helton, tense, or depressed. Your relationships may suffer, and you may not do well at work or school.  What can you do to manage stress?  You can try these things to help manage stress:   Do something active. Exercise or activity can help reduce stress. Walking is a great way to get started. Even everyday activities such as housecleaning or yard work can help.  Try yoga or linda chi. These techniques combine exercise and meditation. You may need some training at first to learn them.  Do something you enjoy. For example, listen to music or go to a movie. Practice your hobby or do volunteer work.  Meditate. This can help you relax, because you are not worrying about what happened before or what may happen in the future.  Do guided imagery. Imagine yourself in any setting that helps you feel calm. You can use online videos, books, or a teacher to guide you.  Do breathing exercises. For example:  From a standing position, bend forward from the waist with your knees slightly bent. Let your arms dangle close to the floor.  Breathe in slowly and deeply as you return to a standing position. Roll up slowly and lift  "your head last.  Hold your breath for just a few seconds in the standing position.  Breathe out slowly and bend forward from the waist.  Let your feelings out. Talk, laugh, cry, and express anger when you need to. Talking with supportive friends or family, a counselor, or a ashly leader about your feelings is a healthy way to relieve stress. Avoid discussing your feelings with people who make you feel worse.  Write. It may help to write about things that are bothering you. This helps you find out how much stress you feel and what is causing it. When you know this, you can find better ways to cope.  What can you do to prevent stress?  You might try some of these things to help prevent stress:  Manage your time. This helps you find time to do the things you want and need to do.  Get enough sleep. Your body recovers from the stresses of the day while you are sleeping.  Get support. Your family, friends, and community can make a difference in how you experience stress.  Limit your news feed. Avoid or limit time on social media or news that may make you feel stressed.  Do something active. Exercise or activity can help reduce stress. Walking is a great way to get started.  Where can you learn more?  Go to https://www.Crowdly.net/patiented  Enter N032 in the search box to learn more about \"Learning About Stress.\"  Current as of: February 26, 2023               Content Version: 13.8    8658-7908 [employer].   Care instructions adapted under license by your healthcare professional. If you have questions about a medical condition or this instruction, always ask your healthcare professional. [employer] disclaims any warranty or liability for your use of this information.      "

## 2024-02-15 NOTE — PROGRESS NOTES
"Preventive Care Visit  [facility]  Gali Kennedy MD, Family Medicine  Feb 15, 2024    Assessment & Plan     (Z00.00) Encounter for Medicare annual wellness exam    (I10) Hypertension goal BP (blood pressure) < 150/90  (primary encounter diagnosis)  Comment:   Blood pressure is excellent, will stop amlodipine. Monitor blood pressure at home and let me know.  BP Readings from Last 3 Encounters:   02/15/24 115/78   12/04/23 102/72   02/10/23 103/72      Plan: At goal  The current medical regimen is effective;  continue present plan and medications.      (Z13.6) CARDIOVASCULAR SCREENING; LDL GOAL LESS THAN 160  Comment:   LDL Cholesterol Calculated   Date Value Ref Range Status   02/10/2023 132 (H) <=100 mg/dL Final   12/10/2020 121 (H) <100 mg/dL Final     Comment:     Above desirable:  100-129 mg/dl  Borderline High:  130-159 mg/dL  High:             160-189 mg/dL  Very high:       >189 mg/dl        Plan: now eating vegan diet, will recheck    (M65.30) Trigger finger, acquired  Comment: new concern right middle finger, caused by playing guitar, now painful to play  Plan: refer to physical therapy, if ineffective refer to and specialist    (Z13.1) Screening for diabetes mellitus; impaired fasting glucose    (Z78.9) Vegan diet  Will check B12 level    BMI  Estimated body mass index is 25.08 kg/m  as calculated from the following:    Height as of this encounter: 1.585 m (5' 2.4\").    Weight as of this encounter: 63 kg (138 lb 14.4 oz).       Counseling  Appropriate preventive services were discussed with this patient, including applicable screening as appropriate for fall prevention, nutrition, physical activity, Tobacco-use cessation, weight loss and cognition.  Checklist reviewing preventive services available has been given to the patient.  Reviewed patient's diet, addressing concerns and/or questions.   The patient was instructed to see the dentist every 6 months.   He is at risk " for psychosocial distress and has been provided with information to reduce risk.   The patient was provided with written information regarding signs of hearing loss.     Patient has been advised of split billing requirements and indicates understanding: Yes    Subjective   Roderick is a 68 year old, presenting for the following:  Annual Visit  Vegan diet  He's been eating a vegan diet for 7 weeks, he's taking a B12 vitamin daily. He's been vegetarian for 48 years. He's lost weight even though the intention was not to lose weight.    Hypertension Follow-up    Do you check your blood pressure regularly outside of the clinic? No   Are you following a low salt diet? Yes  Are your blood pressures ever more than 140 on the top number (systolic) OR more   than 90 on the bottom number (diastolic), for example 140/90? No        2/15/2024     7:08 AM   Additional Questions   Roomed by Roderick Beyer   Accompanied by self         Health Care Directive  Patient does not have a Health Care Directive or Living Will: Discussed advance care planning with patient; information given to patient to review.    HPI        2/15/2024   General Health   How would you rate your overall physical health? Good   Feel stress (tense, anxious, or unable to sleep) Only a little   (!) STRESS CONCERN      2/15/2024   Nutrition   Diet: Vegetarian/vegan         2/15/2024   Exercise   Days per week of moderate/strenous exercise 5 days         2/15/2024   Social Factors   Frequency of gathering with friends or relatives More than three times a week   Worry food won't last until get money to buy more No   Food not last or not have enough money for food? No   Do you have housing?  Yes   Are you worried about losing your housing? No   Lack of transportation? No   Unable to get utilities (heat,electricity)? No         2/15/2024   Fall Risk   Fallen 2 or more times in the past year? No   Trouble with walking or balance? No          2/15/2024   Activities of Daily  Living- Home Safety   Needs help with the following daily activites None of the above   Safety concerns in the home None of the above         2/15/2024   Dental   Dentist two times every year? (!) NO         2/15/2024   Hearing Screening   Hearing concerns? (!) I NEED TO ASK PEOPLE TO SPEAK UP OR REPEAT THEMSELVES.    (!) TROUBLE FOLLOWING DIALOGUE IN THE THEATHER.         2/15/2024   Driving Risk Screening   Patient/family members have concerns about driving No         2/15/2024   General Alertness/Fatigue Screening   Have you been more tired than usual lately? No         2/15/2024   Urinary Incontinence Screening   Bothered by leaking urine in past 6 months No          No data to display                  Today's PHQ-2 Score:       2/15/2024     6:55 AM   PHQ-2 ( 1999 [employer])   Q1: Little interest or pleasure in doing things 0   Q2: Feeling down, depressed or hopeless 0   PHQ-2 Score 0   Q1: Little interest or pleasure in doing things Not at all   Q2: Feeling down, depressed or hopeless Not at all   PHQ-2 Score 0           2/15/2024   Substance Use   Alcohol more than 3/day or more than 7/wk No   Do you have a current opioid prescription? No   How severe/bad is pain from 1 to 10? 2/10   Do you use any other substances recreationally? No     Social History     Tobacco Use    Smoking status: Never     Passive exposure: Never    Smokeless tobacco: Never   Vaping Use    Vaping Use: Never used   Substance Use Topics    Alcohol use: Yes     Alcohol/week: 1.0 standard drink of alcohol     Types: 1 Standard drinks or equivalent per week     Comment: occasional    Drug use: No           2/15/2024   AAA Screening   Family history of Abdominal Aortic Aneurysm (AAA)? No   Last PSA:   PSA   Date Value Ref Range Status   08/03/2018 2.00 0 - 4 ug/L Final     Comment:     Assay Method:  Chemiluminescence using Siemens Vista analyzer     Prostate Specific Antigen Screen   Date Value Ref Range Status   02/10/2023 2.06 0.00 - 4.50  ng/mL Final     The 10-year ASCVD risk score (Vince SOLOMON, et al., 2019) is: 17.7%    Values used to calculate the score:      Age: 68 years      Sex: Male      Is Non- : No      Diabetic: No      Tobacco smoker: No      Systolic Blood Pressure: 115 mmHg      Is BP treated: Yes      HDL Cholesterol: 44 mg/dL      Total Cholesterol: 207 mg/dL        Reviewed and updated as needed this visit by Provider      Problems               Past Medical History:   Diagnosis Date    CARDIOVASCULAR SCREENING; LDL GOAL LESS THAN 160 2/2/2016    Hypertension      Past Surgical History:   Procedure Laterality Date    COLONOSCOPY N/A 12/14/2018    Procedure: COLONOSCOPY;  Surgeon: Rere Calvo MD;  Location:  GI    VASECTOMY  1986    uncomplicated     Allergies   Allergen Reactions    Fentanyl Nausea     Current providers sharing in care for this patient include:  Patient Care Team:  Gali Kennedy MD as PCP - General (Family Practice)  Gali Kennedy MD as Assigned PCP    The following health maintenance items are reviewed in Epic and correct as of today:  Health Maintenance   Topic Date Due    A1C  Never done    RSV VACCINE (Pregnancy & 60+) (1 - 1-dose 60+ series) Never done    COVID-19 Vaccine (6 - 2023-24 season) 09/01/2023    BMP  02/10/2024    LIPID  02/10/2024    MICROALBUMIN  02/10/2024    ANNUAL REVIEW OF HM ORDERS  02/10/2024    ZOSTER IMMUNIZATION (3 of 3) 04/17/2023    MEDICARE ANNUAL WELLNESS VISIT  02/15/2025    FALL RISK ASSESSMENT  02/15/2025    DTAP/TDAP/TD IMMUNIZATION (2 - Td or Tdap) 02/02/2026    GLUCOSE  02/10/2026    ADVANCE CARE PLANNING  02/10/2028    COLORECTAL CANCER SCREENING  12/14/2028    HEPATITIS C SCREENING  Completed    PHQ-2 (once per calendar year)  Completed    INFLUENZA VACCINE  Completed    Pneumococcal Vaccine: 65+ Years  Completed    IPV IMMUNIZATION  Aged Out    HPV IMMUNIZATION  Aged Out    MENINGITIS IMMUNIZATION  Aged Out    RSV MONOCLONAL  "ANTIBODY  Aged Out     Review of Systems  Constitutional, neuro, ENT, endocrine, pulmonary, cardiac, gastrointestinal, genitourinary, musculoskeletal, integument and psychiatric systems are negative, except as otherwise noted.     Objective    Exam  /78 (BP Location: Right arm, Patient Position: Sitting, Cuff Size: Adult Regular)   Pulse (!) 124   Temp 97.3  F (36.3  C) (Temporal)   Resp 16   Ht 1.585 m (5' 2.4\")   Wt 63 kg (138 lb 14.4 oz)   SpO2 100%   BMI 25.08 kg/m     Estimated body mass index is 25.08 kg/m  as calculated from the following:    Height as of this encounter: 1.585 m (5' 2.4\").    Weight as of this encounter: 63 kg (138 lb 14.4 oz).    Physical Exam  GENERAL: alert and no distress  NECK: no adenopathy, no asymmetry, masses, or scars  RESP: lungs clear to auscultation - no rales, rhonchi or wheezes  CV: regular rate and rhythm, normal S1 S2, no S3 or S4, no murmur, click or rub, no peripheral edema  ABDOMEN: soft, nontender, no hepatosplenomegaly, no masses and bowel sounds normal  MS: no gross musculoskeletal defects noted, no edema  SKIN: no suspicious lesions or rashes  NEURO: Normal strength and tone, mentation intact and speech normal  PSYCH: mentation appears normal, affect normal/bright        2/15/2024   Mini Cog   Clock Draw Score 2 Normal            Signed Electronically by: Gali Kennedy MD    "

## 2024-02-16 DIAGNOSIS — M65.30 TRIGGER FINGER, ACQUIRED: Primary | ICD-10-CM

## 2024-02-16 LAB
ANION GAP SERPL CALCULATED.3IONS-SCNC: 14 MMOL/L (ref 7–15)
BUN SERPL-MCNC: 14.9 MG/DL (ref 8–23)
CALCIUM SERPL-MCNC: 9.3 MG/DL (ref 8.8–10.2)
CHLORIDE SERPL-SCNC: 104 MMOL/L (ref 98–107)
CHOLEST SERPL-MCNC: 170 MG/DL
CREAT SERPL-MCNC: 0.78 MG/DL (ref 0.67–1.17)
DEPRECATED HCO3 PLAS-SCNC: 23 MMOL/L (ref 22–29)
EGFRCR SERPLBLD CKD-EPI 2021: >90 ML/MIN/1.73M2
FASTING STATUS PATIENT QL REPORTED: YES
GLUCOSE SERPL-MCNC: 89 MG/DL (ref 70–99)
HDLC SERPL-MCNC: 42 MG/DL
LDLC SERPL CALC-MCNC: 109 MG/DL
NONHDLC SERPL-MCNC: 128 MG/DL
POTASSIUM SERPL-SCNC: 4.6 MMOL/L (ref 3.4–5.3)
PSA SERPL DL<=0.01 NG/ML-MCNC: 2.95 NG/ML (ref 0–4.5)
SODIUM SERPL-SCNC: 141 MMOL/L (ref 135–145)
TRIGL SERPL-MCNC: 93 MG/DL
VIT B12 SERPL-MCNC: 1210 PG/ML (ref 232–1245)

## 2024-02-29 ENCOUNTER — THERAPY VISIT (OUTPATIENT)
Dept: OCCUPATIONAL THERAPY | Facility: CLINIC | Age: 69
End: 2024-02-29
Payer: COMMERCIAL

## 2024-02-29 DIAGNOSIS — M65.30 TRIGGER FINGER, ACQUIRED: ICD-10-CM

## 2024-02-29 DIAGNOSIS — M79.641 PAIN OF RIGHT HAND: Primary | ICD-10-CM

## 2024-02-29 PROCEDURE — 97530 THERAPEUTIC ACTIVITIES: CPT | Mod: GO | Performed by: OCCUPATIONAL THERAPIST

## 2024-02-29 PROCEDURE — 97165 OT EVAL LOW COMPLEX 30 MIN: CPT | Mod: GO | Performed by: OCCUPATIONAL THERAPIST

## 2024-02-29 PROCEDURE — 97760 ORTHOTIC MGMT&TRAING 1ST ENC: CPT | Mod: GO | Performed by: OCCUPATIONAL THERAPIST

## 2024-02-29 NOTE — PROGRESS NOTES
"OCCUPATIONAL THERAPY EVALUATION  Type of Visit: Evaluation    See electronic medical record for Abuse and Falls Screening details.    Subjective      Presenting condition or subjective complaint: Trigger finger on right hand.  Date of onset: 02/16/24 (Referral)    Relevant medical history: High blood pressure   Dates & types of surgery: 2021 - rotator cuff right arm    Patient is a pleasant gentleman comes to therapy today for evaluation and treatment of a trigger finger to his right hand.  He complains that symptoms began approximately 1 year ago with insidious onset, though have steadily worsened.  He does at times awake with his fingers \"stuck\" in a fist, and experiences intermittent \"triggering\" and locking during the day.  He is a professional musician playing bass, piano, and mandolin , feels that this may contribute to his symptoms as peers have experienced similar difficulties.     Prior diagnostic imaging/testing results:       Prior therapy history for the same diagnosis, illness or injury: No      Prior Level of Function  Transfers: Independent  Ambulation: Independent  ADL: Independent  IADL: Driving, Finances, Housekeeping, Laundry, Meal preparation, Medication management, Work, Yard work    Living Environment  Social support: Alone   Type of home: 2-story   Stairs to enter the home: Yes 4 Is there a railing: Yes   Ramp: No   Stairs inside the home: Yes 12 Is there a railing: Yes   Help at home: None  Equipment owned:       Employment: Yes Musician.  Hobbies/Interests: Model building, reading, movies.Musician (guitar and bass)    Patient goals for therapy:  Move finger without pain.        Objective   ADDITIONAL HISTORY:  Right hand dominant  Patient reports symptoms of pain, stiffness/loss of motion, weakness/loss of strength, and edema  Transportation: drives  Currently not working, though a frequent professional musician.     Functional Outcome Measure:   Upper Extremity Functional Index " Score:  SCORE:   Column Totals: /80: 69   (A lower score indicates greater disability.)    PAIN:  Pain Level at Rest: 0/10  Pain Level with Use: 5/10  Pain Location: R MF A1 pulley   Pain Quality: Aching and Sharp  Pain Frequency: intermittent  Pain is Worst: daytime or nighttime  Pain is Exacerbated By: Upon waking, guitar playing, heavy gripping and squeezing.  Pain is Relieved By: rest and repositioning.  Pain Progression: Unchanged    POSTURE: Normal     EDEMA:  Palpable edema and nodules beneath and proximal to the R MF, RF A1 pulleys, otherwise negative.       SENSATION: WNL throughout all nerve distributions; per patient report     ROM:  Grossly within normal limits all planes to the wrist and digits bilaterally, some discomfort with passive digital extension to the right hand.     SPECIAL TESTS:   Stage of Stenosing Tenosynovitis (SST) Left Right   Finger  Stage I-II to all assessed digits.  Stage IV-V to MF, Stage II to IF, RF, Stage I to SF   Stage 1:  Normal  Stage 2:  Uneven motion of tendon  Stage 3:  Triggering, clicking, catching  Stage 4:  Locking in extension or flexion; unlocked by active motion  Stage 5:  Locking in extension or flexion; unlocked by passive motion  Stage 6:  Finger locked in extension or flexion    STRENGTH:  NT today.     PALPATION:  TTP only to R MF A1 pulley.       Assessment & Plan   CLINICAL IMPRESSIONS  Medical Diagnosis: Trigger finger, acquired.    Treatment Diagnosis: Trigger finger, acquired. Pain of right hand    Impression/Assessment: Pt is a 68 year old male presenting to Occupational Therapy due to trigger finger, pain of the right hand.  The following significant findings have been identified: Impaired activity tolerance, Impaired coordination, Impaired ROM, Impaired strength, and Pain.  These identified deficits interfere with their ability to perform self care tasks, recreational activities, household chores, driving , medication management, financial management,   yard work, and meal planning and preparation as compared to previous level of function.   Patient's limitations or Problem List includes: Pain, Decreased ROM/motion, Increased edema, Weakness, Sensory disturbance, Hypomobility, Decreased , Decreased pinch, Decreased coordination, Decreased dexterity, and Tightness in musculature of the right hand, index finger, long finger, and ring finger which interferes with the patient's ability to perform Self Care Tasks (dressing), Work Tasks, Sleep Patterns, Recreational Activities, Household Chores, and Driving  as compared to previous level of function.    Clinical Decision Making (Complexity):  Assessment of Occupational Performance: 1-3 Performance Deficits  Occupational Performance Limitations: dressing, communication management, driving and community mobility, health management and maintenance, home establishment and management, meal preparation and cleanup, shopping, sleep, leisure activities, and social participation  Clinical Decision Making (Complexity): Low complexity    PLAN OF CARE  Treatment Interventions:  Modalities:  US  Therapeutic Exercise:  AROM, AAROM, PROM, Tendon Gliding, Blocking, Reverse Blocking, Place and Hold, Contract Relax, Extensor Tracking, Isotonics, Isometrics, and Stabilization  Neuromuscular re-education:  Nerve Gliding, Coordination/Dexterity, Kinesthetic Training, Proprioceptive Training, Posture, Kinesiotaping, Strain Counter Strain, Isometrics, and Stabilization  Manual Techniques:  Coordination/Dexterity, Joint mobilization, Friction massage, Myofascial release, and Manual edema mobilization  Orthotic Fabrication:  Static, Finger based, and Hand based  Self Care:  Self Care Tasks, Ergonomic Considerations, and Work Tasks    Long Term Goals   OT Goal 1  Goal Identifier: Goal I  Goal Description: Patient will exhibit stage II or better flexor tenosynovitis to the digits of the right hand.  Rationale: In order to maximize safety and  independence with performance of self-care activities;In order to maximize safety and independence with ADL/IADLs  Goal Progress: New  Target Date: 04/25/24      Frequency of Treatment: 1x/week, every other week.  Duration of Treatment: 8 weeks     Education Assessment: Learner/Method: Patient;No Barriers to Learning;Pictures/Video;Demonstration;Reading;Listening     Risks and benefits of evaluation/treatment have been explained.   Patient/Family/caregiver agrees with Plan of Care.     Evaluation Time:    OT Eval, Low Complexity Minutes (80838): 12    Signing Clinician: JESSI Mota [facility]                                                                                   OUTPATIENT OCCUPATIONAL THERAPY      PLAN OF TREATMENT FOR OUTPATIENT REHABILITATION   Patient's Last Name, First Name, Micah Jenkins YOB: 1955   Provider's Name   [facility]   Medical Record No.  [id number]     Onset Date: 02/16/24 (Referral) Start of Care Date: 02/29/24     Medical Diagnosis:  Trigger finger, acquired.      OT Treatment Diagnosis:  Trigger finger, acquired. Pain of right hand Plan of Treatment  Frequency/Duration:1x/week, every other week./8 weeks    Certification date from 02/29/24   To 04/25/24        See note for plan of treatment details and functional goals     Vel Coleman OT                         I CERTIFY THE NEED FOR THESE SERVICES FURNISHED UNDER        THIS PLAN OF TREATMENT AND WHILE UNDER MY CARE     (Physician attestation of this document indicates review and certification of the therapy plan).              Referring Provider:  Gali Kennedy    Initial Assessment  See Epic Evaluation- 02/29/24

## 2024-03-22 DIAGNOSIS — I10 HYPERTENSION GOAL BP (BLOOD PRESSURE) < 150/90: ICD-10-CM

## 2024-03-22 RX ORDER — LISINOPRIL 20 MG/1
TABLET ORAL
Qty: 90 TABLET | Refills: 2 | Status: SHIPPED | OUTPATIENT
Start: 2024-03-22

## 2024-06-11 ENCOUNTER — ANCILLARY PROCEDURE (OUTPATIENT)
Dept: GENERAL RADIOLOGY | Facility: CLINIC | Age: 69
End: 2024-06-11
Attending: FAMILY MEDICINE
Payer: COMMERCIAL

## 2024-06-11 ENCOUNTER — HOSPITAL ENCOUNTER (EMERGENCY)
Facility: CLINIC | Age: 69
Discharge: HOME | End: 2024-06-11
Attending: PHYSICIAN ASSISTANT | Admitting: PHYSICIAN ASSISTANT
Payer: COMMERCIAL

## 2024-06-11 ENCOUNTER — APPOINTMENT (OUTPATIENT)
Dept: GENERAL RADIOLOGY | Facility: CLINIC | Age: 69
End: 2024-06-11
Attending: PHYSICIAN ASSISTANT
Payer: COMMERCIAL

## 2024-06-11 ENCOUNTER — THERAPY VISIT (OUTPATIENT)
Dept: OCCUPATIONAL THERAPY | Facility: CLINIC | Age: 69
End: 2024-06-11
Payer: COMMERCIAL

## 2024-06-11 ENCOUNTER — OFFICE VISIT (OUTPATIENT)
Dept: URGENT CARE | Facility: URGENT CARE | Age: 69
End: 2024-06-11
Payer: COMMERCIAL

## 2024-06-11 VITALS
OXYGEN SATURATION: 95 % | DIASTOLIC BLOOD PRESSURE: 89 MMHG | WEIGHT: 139 LBS | BODY MASS INDEX: 25.1 KG/M2 | RESPIRATION RATE: 16 BRPM | HEART RATE: 86 BPM | TEMPERATURE: 97.5 F | SYSTOLIC BLOOD PRESSURE: 154 MMHG

## 2024-06-11 VITALS
TEMPERATURE: 98.2 F | RESPIRATION RATE: 20 BRPM | SYSTOLIC BLOOD PRESSURE: 146 MMHG | WEIGHT: 139 LBS | BODY MASS INDEX: 25.1 KG/M2 | DIASTOLIC BLOOD PRESSURE: 93 MMHG | OXYGEN SATURATION: 97 % | HEART RATE: 90 BPM

## 2024-06-11 DIAGNOSIS — M65.30 TRIGGER FINGER, ACQUIRED: Primary | ICD-10-CM

## 2024-06-11 DIAGNOSIS — S60.452A FOREIGN BODY OF RIGHT MIDDLE FINGER WITH INFECTION: ICD-10-CM

## 2024-06-11 DIAGNOSIS — M79.641 PAIN OF RIGHT HAND: ICD-10-CM

## 2024-06-11 DIAGNOSIS — L08.9 FOREIGN BODY OF RIGHT MIDDLE FINGER WITH INFECTION: ICD-10-CM

## 2024-06-11 DIAGNOSIS — M79.5 RETAINED FOREIGN BODY OF FINGER: ICD-10-CM

## 2024-06-11 DIAGNOSIS — L03.011 CELLULITIS OF FINGER OF RIGHT HAND: ICD-10-CM

## 2024-06-11 DIAGNOSIS — M79.89 SWELLING OF RIGHT MIDDLE FINGER: Primary | ICD-10-CM

## 2024-06-11 PROCEDURE — 90471 IMMUNIZATION ADMIN: CPT | Performed by: PHYSICIAN ASSISTANT

## 2024-06-11 PROCEDURE — 90715 TDAP VACCINE 7 YRS/> IM: CPT | Mod: JZ | Performed by: PHYSICIAN ASSISTANT

## 2024-06-11 PROCEDURE — 250N000011 HC RX IP 250 OP 636: Mod: JZ | Performed by: PHYSICIAN ASSISTANT

## 2024-06-11 PROCEDURE — 99207 PR NO CHARGE LOS: CPT | Mod: GO | Performed by: OCCUPATIONAL THERAPIST

## 2024-06-11 PROCEDURE — 99284 EMERGENCY DEPT VISIT MOD MDM: CPT | Mod: 25

## 2024-06-11 PROCEDURE — 64450 NJX AA&/STRD OTHER PN/BRANCH: CPT

## 2024-06-11 PROCEDURE — 99214 OFFICE O/P EST MOD 30 MIN: CPT | Performed by: FAMILY MEDICINE

## 2024-06-11 PROCEDURE — 10120 INC&RMVL FB SUBQ TISS SMPL: CPT

## 2024-06-11 PROCEDURE — 73140 X-RAY EXAM OF FINGER(S): CPT | Mod: TC | Performed by: RADIOLOGY

## 2024-06-11 PROCEDURE — 73140 X-RAY EXAM OF FINGER(S): CPT | Mod: RT

## 2024-06-11 RX ORDER — SULFAMETHOXAZOLE/TRIMETHOPRIM 800-160 MG
1 TABLET ORAL 2 TIMES DAILY
Qty: 20 TABLET | Refills: 0 | Status: SHIPPED | OUTPATIENT
Start: 2024-06-11 | End: 2024-06-21

## 2024-06-11 RX ORDER — DOXYCYCLINE 100 MG/1
100 CAPSULE ORAL 2 TIMES DAILY
Qty: 14 CAPSULE | Refills: 0 | Status: SHIPPED | OUTPATIENT
Start: 2024-06-11 | End: 2024-06-18

## 2024-06-11 RX ORDER — CEPHALEXIN 500 MG/1
500 CAPSULE ORAL 3 TIMES DAILY
Qty: 30 CAPSULE | Refills: 0 | Status: SHIPPED | OUTPATIENT
Start: 2024-06-11 | End: 2024-06-11

## 2024-06-11 RX ORDER — CEPHALEXIN 500 MG/1
500 CAPSULE ORAL 4 TIMES DAILY
Qty: 28 CAPSULE | Refills: 0 | Status: SHIPPED | OUTPATIENT
Start: 2024-06-11 | End: 2024-06-18

## 2024-06-11 RX ADMIN — CLOSTRIDIUM TETANI TOXOID ANTIGEN (FORMALDEHYDE INACTIVATED), CORYNEBACTERIUM DIPHTHERIAE TOXOID ANTIGEN (FORMALDEHYDE INACTIVATED), BORDETELLA PERTUSSIS TOXOID ANTIGEN (GLUTARALDEHYDE INACTIVATED), BORDETELLA PERTUSSIS FILAMENTOUS HEMAGGLUTININ ANTIGEN (FORMALDEHYDE INACTIVATED), BORDETELLA PERTUSSIS PERTACTIN ANTIGEN, AND BORDETELLA PERTUSSIS FIMBRIAE 2/3 ANTIGEN 0.5 ML: 5; 2; 2.5; 5; 3; 5 INJECTION, SUSPENSION INTRAMUSCULAR at 19:21

## 2024-06-11 ASSESSMENT — ACTIVITIES OF DAILY LIVING (ADL)
ADLS_ACUITY_SCORE: 35

## 2024-06-11 ASSESSMENT — COLUMBIA-SUICIDE SEVERITY RATING SCALE - C-SSRS
6. HAVE YOU EVER DONE ANYTHING, STARTED TO DO ANYTHING, OR PREPARED TO DO ANYTHING TO END YOUR LIFE?: NO
1. IN THE PAST MONTH, HAVE YOU WISHED YOU WERE DEAD OR WISHED YOU COULD GO TO SLEEP AND NOT WAKE UP?: NO
2. HAVE YOU ACTUALLY HAD ANY THOUGHTS OF KILLING YOURSELF IN THE PAST MONTH?: NO

## 2024-06-11 NOTE — PROGRESS NOTES
Chief Complaint   Patient presents with    Urgent Care    Finger     Patient presents with his right middle finger swollen and in pain. Patient denies any injury or trauma.         Roderick was seen today for urgent care and finger.    Diagnoses and all orders for this visit:    Swelling of right middle finger  -     XR Finger Right G/E 2 Views  -     sulfamethoxazole-trimethoprim (BACTRIM DS) 800-160 MG tablet; Take 1 tablet by mouth 2 times daily for 10 days  -     cephALEXin (KEFLEX) 500 MG capsule; Take 1 capsule (500 mg) by mouth 3 times daily for 10 days    Foreign body of right middle finger with infection  -     Orthopedic  Referral; Future  -     Orthopedic  Referral; Future    D/d- finger infection, fracture , finger sprain , big bite , finger cellulitis ,tenosynovitis , gout , pseudogout , osteomyelitis       Due to extent of pain and the worsening of symptoms, and also the x-ray finding did show erosion of the tip of the distal phalanx of the middle finger and there is a question of osteomyelitis and MRI was recommended by radiologist patient was referred to ER for further evaluation and treatment reviewed x-ray results with patient in details.    SUBJECTIVE:  Micah Kelly is a 69 year old male present with a   right finger pain ( middle ) pain located over the dorsal phalanx for last 7 days ago. Mechanism of injury: not sure but thinks could be related to finger poked by sharp object .  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  Symptoms have been gradual since that time. Prior history of related problems: has h/o trigger finger .  Over the last 2 days has been noticing the pain has worsened significantly.  With increased swelling of the distal phalanx of the right middle finger.    OBJECTIVE:  Vital signs as noted above.  Appearance: in no apparent distress.  Hand exam: soft tissue tenderness and  swelling at the distal phalanx of the middle finger of the right hand , reduced range of motion of middle finger .  X-ray: soft tissue swelling.  Results for orders placed or performed in visit on 06/11/24   XR Finger Right G/E 2 Views     Status: None    Narrative    FINGER(S) TWO-THREE VIEWS RIGHT  6/11/2024 3:54 PM     HISTORY: Swelling of right middle finger  COMPARISON: None.      Impression    IMPRESSION: There is a 4 mm linear metallic foreign body overlying the  third distal phalangeal tuft. There is an adjacent punctate metallic  foreign body. Surrounding soft tissue swelling. There is erosive  change of the third distal phalangeal tuft which may suggest  osteomyelitis in the appropriate clinical setting. MRI may provide  further evaluation. No acute fracture or malalignment.    CHRISTIAN SOLIMAN MD         SYSTEM ID:  [id number]

## 2024-06-11 NOTE — ED PROVIDER NOTES
Emergency Department Note      History of Present Illness     Chief Complaint  Wound Check    HPI  Micah Kelly is a 69 year old male who presents with a foreign body in the right third finger. States he began noticing redness, swelling, and pain around a week ago. 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 He went to an urgent care who took an X-ray that revealed a foreign body underneath the nail of the right third digit and patient was referred to ED. Denies any fever or vomiting. He is not on antibiotics. Denies history of diabetes. Last tetanus was on 02/02/2016.    Independent Historian  None    Review of External Notes  Reviewed [facility]; last tetanus was on 02/02/2016. Reviewed UC X-ray which is obvious for foreign body in the finger.  Past Medical History   Medical History and Problem List  Hypertension    Medications  Aspirin  Folic acid  Lisinopril  Percocet  Naproxen  Lisinopril  Amlodipine    Surgical History   Colonoscopy  Vasectomy     Physical Exam   Patient Vitals for the past 24 hrs:   BP Temp Temp src Pulse Resp SpO2 Weight   06/11/24 2008 (!) 146/93 98.2  F (36.8  C) Oral 90 20 97 % --   06/11/24 1803 (!) 162/92 -- -- -- -- -- --   06/11/24 1801 -- 98  F (36.7  C) -- 89 16 95 % 63 kg (139 lb)     Physical Exam  Constitutional: Alert, attentive, GCS 15  HENT:    Nose: Nose normal.    Mouth/Throat: Oropharynx is clear, mucous membranes are moist   Eyes: EOM are normal.   CV: regular rate and rhythm; no murmurs, rubs or gallups. Radial pulses equal and appropriate.   Chest: Effort normal and breath sounds normal.   MSK: Normal range of motion of right third digit including active flexion of the PIP and DIP joints.  Normal active extension.  Distal sensation intact in the left middle finger with brisk capillary refill.  There is significant swelling to the distal finger without evidence of abscess or purulent drainage.  Linear foreign body is identified  underneath the nail.   Neurological: Alert, attentive  Skin: Skin is warm and dry.   Diagnostics     Imaging  XR Finger Right G/E 2 Views   Final Result   IMPRESSION: 3 views of the middle finger. There is a residual punctate hyperdensity within the distal phalanx/tuft of the middle finger. The previously seen linear 4 mm foreign body has been removed. Note is made that there is some cortical irregularity    and erosive change along the distal tuft/phalanx of the middle finger which does raise concern for distal phalangeal osteomyelitis as stated previously.      NOTE: ABNORMAL REPORT      THE DICTATION ABOVE DESCRIBES AN ABNORMALITY FOR WHICH FOLLOW-UP IS NEEDED.            Independent Interpretation  X-ray right middle finger shows linear foreign body and small punctate foreign body within the skin.  ED Course    Medications Administered  Medications   Tdap (tetanus-diphtheria-acell pertussis) (ADACEL) injection 0.5 mL (0.5 mLs Intramuscular $Given 6/11/24 1921)       Procedures  Procedures    Digital Block       Procedure: Digital Block    Indication: Wound care    Consent: Verbal    Preparation: Chlorhexidine    Anesthesia: A digital block was performed with Bupivacaine - 0.5% on the affected digit.     Location: L third (middle) finger    Procedure Detail: A digital block was performed on the indicated digit with the above anesthetic.     Patient status: The patient tolerated the procedure well: Yes. There were no complications.        Foreign Body Removal     Procedure: Foreign Body Removal    Consent: Verbal    Risks Discussed: pain, bleeding, damage to adjacent structures, incomplete removal, infection, and repeat attempts    Indication: Foreign body     Location: Subcutaneousfinger    Preparation: Alcohol    Anesthesia/Sedation: Bupivacaine - 0.5%    Procedure Detail:   Technique instruments:  Splinter forceps  Description: The foreign body was located and removed.     Patient Status: The patient tolerated  the procedure well: Yes. There were no complications.       Discussion of Management  None    Social Determinants of Health adding to complexity of care  None    ED Course  ED Course as of 06/11/24 232   Tue Jun 11, 2024 1856 I entered the patient's room and obtained history.   1910 I performed a foreign body removal procedure, see procedure note above.      Medical Decision Making / Diagnosis   CMS Diagnoses: None    MIPS     None    MDM  Micah Kelly is a 69 year old male who presents with swelling, warmth, and tenderness of the distal right finger for the past week.  He is afebrile, mildly hypertensive at 146/93, nonhypoxic.  Physical examination reveals finger as above without evidence of abscess or infectious tenosynovitis. Finger remains neurovascularly intact.   Based on patient's x-ray from urgent care, I suspect patient likely has cellulitis with early felon from retained foreign body.  A digital block was placed and foreign body was removed with success.  There remains a punctate small foreign body within the distal soft tissues of the right third finger however suspect this is been embedded in the finger for a while.  Patient had symptomatic relief with digital block.  I reviewed patient's x-rays and note that the radiologist noted possible osteomyelitis.  At this time, I have low suspicion for acute osteomyelitis given patient's exam and hemodynamic status.  Results reviewed and discussed with patient at bedside.  We discussed x-ray findings and noted that he is at risk for complications such as osteomyelitis however using shared decision making, patient would prefer to try to manage symptoms with oral antibiotics at home.  I recommend that he follow-up with hand specialist at [facility] and contact information provided.  He otherwise is well-appearing and may continue supportive cares at home including daily finger soaks and rest. Patient declined pain medication.  I cautioned him that if he should  have worsening symptoms such as fevers, vomiting, worsening pain, or abscess formation, he will need to return to the ED.  Patient comfortable with plan and was discharged home.    Disposition  The patient was discharged.     ICD-10 Codes:    ICD-10-CM    1. Retained foreign body of finger  M79.5       2. Cellulitis of finger of right hand  L03.011            Discharge Medications  Discharge Medication List as of 6/11/2024  8:15 PM        START taking these medications    Details   doxycycline hyclate (VIBRAMYCIN) 100 MG capsule Take 1 capsule (100 mg) by mouth 2 times daily for 7 days, Disp-14 capsule, R-0, E-Prescribe           Scribe Disclosure:  Jaleel JONES Hired, am serving as a scribe at 8:11 PM on 6/11/2024 to document services personally performed by Michelle Covington PA-C based on my observations and the provider's statements to me.        Michelle Covington PA-C  06/11/24 8829

## 2024-06-11 NOTE — ED TRIAGE NOTES
Pt has  painful swollen right middle finger for the past week, at clinic today xray shows a piece of metal under the skin, UC sent pt to ED     Triage Assessment (Adult)       Row Name 06/11/24 7085          Triage Assessment    Airway WDL WDL        Respiratory WDL    Respiratory WDL WDL        Skin Circulation/Temperature WDL    Skin Circulation/Temperature WDL --  swelling and redness to right middle finger        Cardiac WDL    Cardiac WDL WDL        Peripheral/Neurovascular WDL    Peripheral Neurovascular WDL WDL        Cognitive/Neuro/Behavioral WDL    Cognitive/Neuro/Behavioral WDL WDL

## 2024-06-11 NOTE — PROGRESS NOTES
06/11/24 0500   Appointment Info   Treating Provider CLIFTON Mota, OTR/L, CHT   Total/Authorized Visits 5 (POC)   Visits Used 2   Medical Diagnosis Trigger finger, acquired.   OT Tx Diagnosis Trigger finger, acquired. Pain of right hand   Quick Add  Certification   Progress Note/Certification   Start Of Care Date 02/29/24   Onset of Illness/Injury or Date of Surgery 02/16/24  (Referral)   Therapy Frequency 1x/week, every other week.   Predicted Duration 8 weeks   Certification date from 02/29/24   Certification date to 04/25/24   Progress Note Due Date 04/25/24   Progress Note Completed Date 02/29/24   Goals   OT Goals 1   OT Goal 1   Goal Identifier Goal I   Goal Description Patient will exhibit stage II or better flexor tenosynovitis to the digits of the right hand.   Rationale In order to maximize safety and independence with performance of self-care activities;In order to maximize safety and independence with ADL/IADLs   Goal Progress New   Target Date 04/25/24   Subjective Report   Subjective Report See eval   Objective Measures   Objective Measures Objective Measure 1;Objective Measure 2;Objective Measure 3   Objective Measure 3   Objective Measure Patient presents with significant edema about the distal phalanx of the right index finger, DIP joint, P2, and PIP joint.  This is slightly mottled, very tender to palpation, and the digit itself is held in slight flexion.  There is some discomfort with percussion along the flexor tendon sheath, active and passive extension are exquisitely painful.   Details Observations and special tests   Treatment Interventions (OT)   Interventions Therapeutic Activity;Therapeutic Procedure/Exercise;Orthotics;Self Care/Home Management   Self Care/Home Management   Self Care 1 Given the observations above, concern is raised for superficial or even an infectious process to the flexor tendon sheath.  Patient is subsequently referred directly to the urgent care facility in  this clinic for evaluation.   Self-Care/Home Mgmt/ADL, Compensatory, Meal Prep Minutes (97515) 7 Minutes   Skilled Intervention For soft tissue protection and infection prophylaxis.   Patient Response/Progress Tolerated well, patient demonstrated and verbalized understanding.   Total Session Time   Timed Code Treatment Minutes 7   Total Treatment Time (sum of timed and untimed services) 7

## 2024-06-12 ENCOUNTER — TELEPHONE (OUTPATIENT)
Dept: ORTHOPEDICS | Facility: CLINIC | Age: 69
End: 2024-06-12
Payer: COMMERCIAL

## 2024-06-12 ENCOUNTER — PATIENT OUTREACH (OUTPATIENT)
Dept: FAMILY MEDICINE | Facility: CLINIC | Age: 69
End: 2024-06-12
Payer: COMMERCIAL

## 2024-06-12 NOTE — TELEPHONE ENCOUNTER
Called Pt and LVM for return call to schedule with hand provider for finger injury.  Kailash CLARK

## 2024-06-12 NOTE — DISCHARGE INSTRUCTIONS
You will start two oral antibiotics for suspected finger infection. Please use as prescribed and follow-up with hand specialist at [facility] at contact information provided. Continue rest, elevation of hand, and twice daily soaks with soapy water. For any new or worsening symptoms such as fevers, spreading redness, purulent discharge, or worsening pain, present back to ED.

## 2024-06-12 NOTE — TELEPHONE ENCOUNTER
Patient has a referral to be seen by hand ortho ASAP for Foreign body of right middle finger with infection     Please assist patient with scheduling appt.

## 2024-06-12 NOTE — TELEPHONE ENCOUNTER
Transitions of Care Outreach  Chief Complaint   Patient presents with    Hospital F/U     ER Discharge: 6/11/24       Most Recent Admission Date: 6/11/2024   Most Recent Admission Diagnosis:      Most Recent Discharge Date: 6/11/2024   Most Recent Discharge Diagnosis: Retained foreign body of finger - M79.5  Cellulitis of finger of right hand - L03.011     Transitions of Care Assessment    Discharge Assessment  How are you doing now that you are home?: Will call to schedule Orthopedic appt.  Doing okay now that wire is removed; started antibiotics.  Was told edema and pain will take a while to resolve.  How are your symptoms? (Red Flag symptoms escalate to triage hotline per guidelines): Unchanged  Do you know how to contact your clinic care team if you have future questions or changes to your health status? : Yes  Does the patient have their discharge instructions? : Yes  Does the patient have questions regarding their discharge instructions? : No  Were you started on any new medications or were there changes to any of your previous medications? : Yes (Antibiotic)  Does the patient have all of their medications?: Yes  Do you have questions regarding any of your medications? : No  Do you have all of your needed medical supplies or equipment (DME)?  (i.e. oxygen tank, CPAP, cane, etc.): Yes    Follow up Plan     Discharge Follow-Up  Discharge follow up appointment scheduled in alignment with recommended follow up timeframe or Transitions of Risk Category? (Low = within 30 days; Moderate= within 14 days; High= within 7 days): No (Needs specialty appt for follow up-Orthopedics.)    No future appointments.    Outpatient Plan as outlined on AVS reviewed with patient.    For any urgent concerns, please contact our 24 hour nurse triage line: [phone] ([phone])       CHELY OliverN, RN-[facility]

## 2024-06-13 DIAGNOSIS — M86.141 OTHER ACUTE OSTEOMYELITIS OF RIGHT HAND (H): Primary | ICD-10-CM

## 2024-06-13 NOTE — PROGRESS NOTES
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 The FB was removed and he was given doxycycline in the ER. His symptoms Have improved but he does still have pain in the middle fingertip. His doxycycline ended yesterday. No fevers or chills, no pus.    Clinical documentation by CAIT Covington on 6/11/2024 was reviewed.    Occupation: mostly retired, works part time as a musician playing the bass guitar    Past Medical History:   Past Medical History:   Diagnosis Date    CARDIOVASCULAR SCREENING; LDL GOAL LESS THAN 160 2/2/2016    Hypertension        Past Surgical History:   Past Surgical History:   Procedure Laterality Date    COLONOSCOPY N/A 12/14/2018    Procedure: COLONOSCOPY;  Surgeon: Rere Calvo MD;  Location:  GI    VASECTOMY  1986    uncomplicated       Medications:   Current Outpatient Medications:     Ascorbic Acid (VITAMIN C PO), Take 1,000 mg by mouth daily, Disp: , Rfl:     aspirin (ASA) 81 MG chewable tablet, Take 81 mg by mouth, Disp: , Rfl:     ASPIRIN PO, Take 81 mg by mouth daily , Disp: , Rfl:     folic acid-vit B6-vit B12 (FOLGARD) 0.8-10-0.115 MG TABS per tablet, Take by mouth daily, Disp: , Rfl:     lisinopril (ZESTRIL) 20 MG tablet, TAKE 1 TABLET BY MOUTH EVERY DAY, Disp: 90 tablet, Rfl: 2    sulfamethoxazole-trimethoprim (BACTRIM DS) 800-160 MG tablet, Take 1 tablet by mouth 2 times daily for 10 days, Disp: 20 tablet, Rfl: 0    VITAMIN D, CHOLECALCIFEROL, PO, Take 400 Units by mouth daily, Disp: ,  Rfl:     Allergy:   Allergies   Allergen Reactions    Fentanyl Nausea       Social History:   History   Smoking Status    Never   Smokeless Tobacco    Never      Social History     Tobacco Use    Smoking status: Never     Passive exposure: Never    Smokeless tobacco: Never   Vaping Use    Vaping status: Never Used   Substance Use Topics    Alcohol use: Yes     Alcohol/week: 1.0 standard drink of alcohol     Types: 1 Standard drinks or equivalent per week     Comment: occasional    Drug use: No        Family History:   Family History   Problem Relation Age of Onset    Colon Cancer Mother 88    Peptic Ulcer Disease Mother     Hypertension Father 98        d age 98 of stroke    Alzheimer Disease Father     Hyperlipidemia Father     Hypothyroidism Father     Kidney Disease Father        Physical Examination:  There were no vitals filed for this visit.  There is no height or weight on file to calculate BMI.    Well appearing, well nourished  Alert and oriented x 3, cooperative with exam     Right middle finger  Redness and swelling at the middle fingertip, no purulence, nail plate appears partially adherent to nailbed, not well adherent distally  Able to flex and extend middle finger  Motor Exam: Intact TU/OK/x2-3  Sensory Exam: Sensation intact to light touch in FDWS (radial), volar IF (median), volar SF (ulnar)  Vascular Exam: fingers wwp    Imaging/Studies:  XR (3 views) of the right middle finger was obtained 6/19/2024.  I reviewed the images with the patient.  The imaging study shows increased erosion of the distal phalanx tuft.    XR (3 views) of the right middle finger was obtained  6/11/2024 .  I reviewed the images with the patient.  The imaging study shows a punctate <1mm foreign body at the tip of the digit, and a linear foreign body at the tip of the digit -- the linear FB was removed. There is an erosion at the tuft of the distal phalanx which maybe consistent with osteomyelitis    Assessment: Micah TRACEY  Leticia is a 69 year old male with right middle finger distal phalanx osteomyelitis (foreign body has since been removed).    Plan:   I had a discussion with the patient regarding my clinical findings, diagnosis, and treatment plan. We discussed treatment options for osteomyelitis including observation, antibiotics with or without surgical debridement, or partial finger amputation. I recommend an infectious disease consult to consider extended duration of antibiotics. He elects surgical debridement and antibiotics. Risks of surgery include bleeding, worsening infection, nail bed injury, nerve damage, scar, stiffness, and need for further surgery including fingertip amputation. All questions answered.     Plan for right middle finger distal phalanx debridement/cultures.   -  Infectious disease consult    Next Visit:   Follow-up: 7-10 days after surgery   Imaging: XR right middle finger .   Plan: review imaging, wound and symptom check.      SHARDA BENZ MD

## 2024-06-17 ENCOUNTER — TELEPHONE (OUTPATIENT)
Dept: INFECTIOUS DISEASES | Facility: CLINIC | Age: 69
End: 2024-06-17
Payer: COMMERCIAL

## 2024-06-17 NOTE — TELEPHONE ENCOUNTER
SB SCHREIBER 6/17 to see if pt wanted the 2:30 pm spot on 6/25/24 to see Dr. Sanchez (New ID visit; 60 minutes). Otherwise he can keep the July appt with [facility].

## 2024-06-18 DIAGNOSIS — M79.644 FINGER PAIN, RIGHT: Primary | ICD-10-CM

## 2024-06-18 NOTE — TELEPHONE ENCOUNTER
DIAGNOSIS:   Foreign body of right middle finger with infection [S60.452A, L08.9   APPOINTMENT DATE: 06/19/2024   NOTES STATUS DETAILS   OFFICE NOTE from other specialist Internal 6/11/2024  [facility] [place]    OCCUPATIONAL THERAPY     DISCHARGE REPORT from the ER Internal 6/11/2024  [facility]     OPERATIVE REPORT Care Everywhere 02/09/2021 - RIGHT SHOULDER ARTHROSCOPIC SUPERIOR CAPSULAR RECONSTRUCTION WITH ARTHROFLEX ALLOGRAFT   XRAYS (IMAGES & REPORTS) Internal

## 2024-06-18 NOTE — CONFIDENTIAL NOTE
DIAGNOSIS:  Other acute osteomyelitis of right hand    DATE RECEIVED:  06.25.2024     NOTES (Gather within 2 years) STATUS DETAILS   OFFICE NOTE from referring provider   Internal 06.13.2024 Vernell Moore MD    LABS (any labs) Internal    MEDICATION LIST Internal    IMAGING  (NEED IMAGES AND REPORTS)     Other (anything related to diagnoses Internal 06.11.2024 XR Finger Right G/E

## 2024-06-19 ENCOUNTER — PRE VISIT (OUTPATIENT)
Dept: ORTHOPEDICS | Facility: CLINIC | Age: 69
End: 2024-06-19

## 2024-06-19 ENCOUNTER — OFFICE VISIT (OUTPATIENT)
Dept: ORTHOPEDICS | Facility: CLINIC | Age: 69
End: 2024-06-19
Attending: FAMILY MEDICINE
Payer: COMMERCIAL

## 2024-06-19 ENCOUNTER — ANCILLARY PROCEDURE (OUTPATIENT)
Dept: GENERAL RADIOLOGY | Facility: CLINIC | Age: 69
End: 2024-06-19
Attending: STUDENT IN AN ORGANIZED HEALTH CARE EDUCATION/TRAINING PROGRAM
Payer: COMMERCIAL

## 2024-06-19 DIAGNOSIS — S60.452A FOREIGN BODY OF RIGHT MIDDLE FINGER WITH INFECTION: ICD-10-CM

## 2024-06-19 DIAGNOSIS — L08.9 FOREIGN BODY OF RIGHT MIDDLE FINGER WITH INFECTION: ICD-10-CM

## 2024-06-19 DIAGNOSIS — M79.644 FINGER PAIN, RIGHT: ICD-10-CM

## 2024-06-19 PROCEDURE — 73140 X-RAY EXAM OF FINGER(S): CPT | Mod: RT | Performed by: RADIOLOGY

## 2024-06-19 PROCEDURE — 99204 OFFICE O/P NEW MOD 45 MIN: CPT | Performed by: STUDENT IN AN ORGANIZED HEALTH CARE EDUCATION/TRAINING PROGRAM

## 2024-06-19 NOTE — LETTER
6/19/2024      Micah Kelly  [address]  [place] [address]      Dear Colleague,    Thank you for referring your patient, Micah Kelly, to the [facility] [place]. Please see a copy of my visit note below.    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 The FB was removed and he was given doxycycline in the ER. His symptoms Have improved but he does still have pain in the middle fingertip. His doxycycline ended yesterday. No fevers or chills, no pus.    Clinical documentation by CAIT Covington on 6/11/2024 was reviewed.    Occupation: mostly retired, works part time as a musician playing the bass guitar    Past Medical History:   Past Medical History:   Diagnosis Date    CARDIOVASCULAR SCREENING; LDL GOAL LESS THAN 160 2/2/2016    Hypertension        Past Surgical History:   Past Surgical History:   Procedure Laterality Date    COLONOSCOPY N/A 12/14/2018    Procedure: COLONOSCOPY;  Surgeon: Rere Calvo MD;  Location:  GI    VASECTOMY  1986    uncomplicated       Medications:   Current Outpatient Medications:     Ascorbic Acid (VITAMIN C PO), Take 1,000 mg by mouth daily, Disp: , Rfl:     aspirin (ASA) 81 MG chewable tablet, Take 81 mg by mouth, Disp: , Rfl:     ASPIRIN PO, Take 81 mg by mouth daily , Disp: , Rfl:     folic acid-vit B6-vit B12 (FOLGARD) 0.8-10-0.115 MG TABS per tablet, Take by mouth daily, Disp: , Rfl:     lisinopril (ZESTRIL) 20 MG tablet, TAKE 1 TABLET BY  MOUTH EVERY DAY, Disp: 90 tablet, Rfl: 2    sulfamethoxazole-trimethoprim (BACTRIM DS) 800-160 MG tablet, Take 1 tablet by mouth 2 times daily for 10 days, Disp: 20 tablet, Rfl: 0    VITAMIN D, CHOLECALCIFEROL, PO, Take 400 Units by mouth daily, Disp: , Rfl:     Allergy:   Allergies   Allergen Reactions    Fentanyl Nausea       Social History:   History   Smoking Status    Never   Smokeless Tobacco    Never      Social History     Tobacco Use    Smoking status: Never     Passive exposure: Never    Smokeless tobacco: Never   Vaping Use    Vaping status: Never Used   Substance Use Topics    Alcohol use: Yes     Alcohol/week: 1.0 standard drink of alcohol     Types: 1 Standard drinks or equivalent per week     Comment: occasional    Drug use: No        Family History:   Family History   Problem Relation Age of Onset    Colon Cancer Mother 88    Peptic Ulcer Disease Mother     Hypertension Father 98        d age 98 of stroke    Alzheimer Disease Father     Hyperlipidemia Father     Hypothyroidism Father     Kidney Disease Father        Physical Examination:  There were no vitals filed for this visit.  There is no height or weight on file to calculate BMI.    Well appearing, well nourished  Alert and oriented x 3, cooperative with exam     Right middle finger  Redness and swelling at the middle fingertip, no purulence, nail plate appears partially adherent to nailbed, not well adherent distally  Able to flex and extend middle finger  Motor Exam: Intact TU/OK/x2-3  Sensory Exam: Sensation intact to light touch in FDWS (radial), volar IF (median), volar SF (ulnar)  Vascular Exam: fingers wwp    Imaging/Studies:  XR (3 views) of the right middle finger was obtained 6/19/2024.  I reviewed the images with the patient.  The imaging study shows increased erosion of the distal phalanx tuft.    XR (3 views) of the right middle finger was obtained  6/11/2024 .  I reviewed the images with the patient.  The imaging study shows a  punctate <1mm foreign body at the tip of the digit, and a linear foreign body at the tip of the digit -- the linear FB was removed. There is an erosion at the tuft of the distal phalanx which maybe consistent with osteomyelitis    Assessment: Micah Kelly is a 69 year old male with right middle finger distal phalanx osteomyelitis (foreign body has since been removed).    Plan:   I had a discussion with the patient regarding my clinical findings, diagnosis, and treatment plan. We discussed treatment options for osteomyelitis including observation, antibiotics with or without surgical debridement, or partial finger amputation. I recommend an infectious disease consult to consider extended duration of antibiotics. He elects surgical debridement and antibiotics. Risks of surgery include bleeding, worsening infection, nail bed injury, nerve damage, scar, stiffness, and need for further surgery including fingertip amputation. All questions answered.     Plan for right middle finger distal phalanx debridement/cultures.   -  Infectious disease consult    Next Visit:   Follow-up: 7-10 days after surgery   Imaging: XR right middle finger .   Plan: review imaging, wound and symptom check.      SHARDA BENZ MD

## 2024-06-19 NOTE — NURSING NOTE
Reason For Visit:   Chief Complaint   Patient presents with    Consult     Right middle finger foreign body with infection        Primary MD: Gali Kennedy  Ref. MD: ED    Age: 69 year old    ?  No      There were no vitals taken for this visit.      Pain Assessment  Patient Currently in Pain: Yes  0-10 Pain Scale: 7  Primary Pain Location: Finger (Comment which one) (Right middle finger)  Pain Descriptors: Intermittent, Sharp, Throbbing    Hand Dominance Evaluation  Hand Dominance: Right          QuickDASH Assessment       No data to display                   Current Outpatient Medications   Medication Sig Dispense Refill    Ascorbic Acid (VITAMIN C PO) Take 1,000 mg by mouth daily      aspirin (ASA) 81 MG chewable tablet Take 81 mg by mouth      ASPIRIN PO Take 81 mg by mouth daily       folic acid-vit B6-vit B12 (FOLGARD) 0.8-10-0.115 MG TABS per tablet Take by mouth daily      lisinopril (ZESTRIL) 20 MG tablet TAKE 1 TABLET BY MOUTH EVERY DAY 90 tablet 2    sulfamethoxazole-trimethoprim (BACTRIM DS) 800-160 MG tablet Take 1 tablet by mouth 2 times daily for 10 days 20 tablet 0    VITAMIN D, CHOLECALCIFEROL, PO Take 400 Units by mouth daily         Allergies   Allergen Reactions    Fentanyl Nausea       SHELLY WATERMAN, ATC

## 2024-06-20 ENCOUNTER — TELEPHONE (OUTPATIENT)
Dept: ORTHOPEDICS | Facility: CLINIC | Age: 69
End: 2024-06-20
Payer: COMMERCIAL

## 2024-06-20 PROBLEM — L08.9: Status: ACTIVE | Noted: 2024-06-19

## 2024-06-20 PROBLEM — S60.452A: Status: ACTIVE | Noted: 2024-06-19

## 2024-06-20 NOTE — TELEPHONE ENCOUNTER
Phoned patient to confirm surgery date with Dr. Moore + [facility].     Call went to voicemail.  Provided call back number in voicemail:   [phone] & [phone] for care team.   Will try again.

## 2024-06-20 NOTE — TELEPHONE ENCOUNTER
Patient is scheduled for surgery with Dr. Moore     Spoke with: Roderick    Date of Surgery: 6/26/24    Location: [facility]    Informed patient they will need an adult  Yes    Pre op with Provider N/A- Local    Additional imaging/appointments: PO Made  7/16/24 at 2PM  Surgery packet: Received     Additional comments: N/A        Chelsea Blair on 6/20/2024 at 9:45 AM

## 2024-06-21 ENCOUNTER — TELEPHONE (OUTPATIENT)
Dept: INFECTIOUS DISEASES | Facility: CLINIC | Age: 69
End: 2024-06-21
Payer: COMMERCIAL

## 2024-06-21 NOTE — TELEPHONE ENCOUNTER
"Called patient and explained that appointment with Dr. Sanchez on 6/25 is scheduled as in-person however she has a virtual clinic that day and offered to reschedule with Dr. Last on 6/27 in-person at 2:30pm. Patient declined this appointment change due to surgery on the 27th on his finger; patient was encouraged by Ortho to be seen in ID prior to surgery on the 27th.     Per office visit note with Dr. Moore in orthopedics on 6/19, \"I recommend an infectious disease consult to consider extended duration of antibiotics. He elects surgical debridement and antibiotics. Plan for right middle finger distal phalanx debridement/cultures. No fevers or chills, no pus.\"    No other in-person new ID patient appointments available prior to finger debridement and culturing with ortho on the 27th.     Will route to provider for recommendation on how to proceed with appointment.   "

## 2024-06-21 NOTE — TELEPHONE ENCOUNTER
Rere Sanchez MD  [facility] Adult [facility] R[facility]  Hello,    It looks like this patient is scheduled as an in-person visit on a virtual day for me. I'm otherwise entirely virtual. I think there is concern this is osteomyelitis. Could he be virtual? Otherwise could he go the next week for me or go to fellows' clinic or something?    Thanks    Rere

## 2024-06-21 NOTE — TELEPHONE ENCOUNTER
Called Roderick and explained that we are comfortable with him having a virtual visit with Dr. Sanchez on 6/25 due to ortho's plan to culture his finger at 6/27 procedure. Requested that he send a MyChart photo in if he notices any redness, swelling, increased pain, or drainage from his wound. Patient inquired about video visits and explained he has not been able to access his MyChart. Reset patient's [employer] password with him and ensured he has [employer] support line. No questions.

## 2024-06-25 ENCOUNTER — TELEPHONE (OUTPATIENT)
Dept: INFECTIOUS DISEASES | Facility: CLINIC | Age: 69
End: 2024-06-25

## 2024-06-25 ENCOUNTER — PRE VISIT (OUTPATIENT)
Dept: INFECTIOUS DISEASES | Facility: CLINIC | Age: 69
End: 2024-06-25
Payer: COMMERCIAL

## 2024-06-25 NOTE — TELEPHONE ENCOUNTER
Patient needs to be rescheduled for their virtual visit due to Reason for Reschedule: No-Show    Appointment mode: Video  Provider: Rere Sanchez

## 2024-06-26 ENCOUNTER — ANCILLARY PROCEDURE (OUTPATIENT)
Dept: RADIOLOGY | Facility: AMBULATORY SURGERY CENTER | Age: 69
End: 2024-06-26
Attending: STUDENT IN AN ORGANIZED HEALTH CARE EDUCATION/TRAINING PROGRAM
Payer: COMMERCIAL

## 2024-06-26 ENCOUNTER — ANCILLARY ORDERS (OUTPATIENT)
Dept: ORTHOPEDICS | Facility: CLINIC | Age: 69
End: 2024-06-26

## 2024-06-26 ENCOUNTER — HOSPITAL ENCOUNTER (OUTPATIENT)
Facility: AMBULATORY SURGERY CENTER | Age: 69
Discharge: HOME | End: 2024-06-26
Attending: STUDENT IN AN ORGANIZED HEALTH CARE EDUCATION/TRAINING PROGRAM | Admitting: STUDENT IN AN ORGANIZED HEALTH CARE EDUCATION/TRAINING PROGRAM
Payer: COMMERCIAL

## 2024-06-26 VITALS
DIASTOLIC BLOOD PRESSURE: 98 MMHG | HEIGHT: 62 IN | SYSTOLIC BLOOD PRESSURE: 136 MMHG | TEMPERATURE: 97.6 F | BODY MASS INDEX: 25.58 KG/M2 | WEIGHT: 139 LBS | OXYGEN SATURATION: 93 % | HEART RATE: 94 BPM | RESPIRATION RATE: 15 BRPM

## 2024-06-26 DIAGNOSIS — L08.9 FOREIGN BODY OF RIGHT MIDDLE FINGER WITH INFECTION: Primary | ICD-10-CM

## 2024-06-26 DIAGNOSIS — S60.452A FOREIGN BODY OF RIGHT MIDDLE FINGER WITH INFECTION: Primary | ICD-10-CM

## 2024-06-26 DIAGNOSIS — L08.9 FOREIGN BODY OF RIGHT MIDDLE FINGER WITH INFECTION: ICD-10-CM

## 2024-06-26 DIAGNOSIS — S60.452A FOREIGN BODY OF RIGHT MIDDLE FINGER WITH INFECTION: ICD-10-CM

## 2024-06-26 PROCEDURE — 87070 CULTURE OTHR SPECIMN AEROBIC: CPT | Performed by: STUDENT IN AN ORGANIZED HEALTH CARE EDUCATION/TRAINING PROGRAM

## 2024-06-26 PROCEDURE — 87116 MYCOBACTERIA CULTURE: CPT | Mod: 90 | Performed by: PATHOLOGY

## 2024-06-26 PROCEDURE — 87102 FUNGUS ISOLATION CULTURE: CPT | Performed by: STUDENT IN AN ORGANIZED HEALTH CARE EDUCATION/TRAINING PROGRAM

## 2024-06-26 PROCEDURE — 99000 SPECIMEN HANDLING OFFICE-LAB: CPT | Performed by: PATHOLOGY

## 2024-06-26 PROCEDURE — 26236 PARTIAL REMOVAL FINGER BONE: CPT | Mod: F7 | Performed by: STUDENT IN AN ORGANIZED HEALTH CARE EDUCATION/TRAINING PROGRAM

## 2024-06-26 PROCEDURE — 87075 CULTR BACTERIA EXCEPT BLOOD: CPT | Performed by: STUDENT IN AN ORGANIZED HEALTH CARE EDUCATION/TRAINING PROGRAM

## 2024-06-26 PROCEDURE — 88304 TISSUE EXAM BY PATHOLOGIST: CPT | Mod: TC | Performed by: STUDENT IN AN ORGANIZED HEALTH CARE EDUCATION/TRAINING PROGRAM

## 2024-06-26 PROCEDURE — 88304 TISSUE EXAM BY PATHOLOGIST: CPT | Mod: 26 | Performed by: PATHOLOGY

## 2024-06-26 PROCEDURE — 87206 SMEAR FLUORESCENT/ACID STAI: CPT | Mod: 90 | Performed by: PATHOLOGY

## 2024-06-26 RX ORDER — OXYCODONE HYDROCHLORIDE 5 MG/1
5 TABLET ORAL EVERY 6 HOURS PRN
Qty: 5 TABLET | Refills: 0 | Status: SHIPPED | OUTPATIENT
Start: 2024-06-26 | End: 2024-06-29

## 2024-06-26 RX ORDER — LIDOCAINE HYDROCHLORIDE 10 MG/ML
5 INJECTION, SOLUTION EPIDURAL; INFILTRATION; INTRACAUDAL; PERINEURAL ONCE
Status: COMPLETED | OUTPATIENT
Start: 2024-06-26 | End: 2024-06-26

## 2024-06-26 RX ORDER — BUPIVACAINE HYDROCHLORIDE 5 MG/ML
5 INJECTION, SOLUTION EPIDURAL; INTRACAUDAL ONCE
Status: COMPLETED | OUTPATIENT
Start: 2024-06-26 | End: 2024-06-26

## 2024-06-26 RX ORDER — SULFAMETHOXAZOLE/TRIMETHOPRIM 800-160 MG
1 TABLET ORAL 2 TIMES DAILY
Qty: 14 TABLET | Refills: 0 | Status: SHIPPED | OUTPATIENT
Start: 2024-06-26

## 2024-06-26 RX ORDER — MAGNESIUM HYDROXIDE 1200 MG/15ML
LIQUID ORAL PRN
Status: DISCONTINUED | OUTPATIENT
Start: 2024-06-26 | End: 2024-06-26 | Stop reason: HOSPADM

## 2024-06-26 RX ORDER — BACITRACIN ZINC 500 [USP'U]/G
OINTMENT TOPICAL PRN
Status: DISCONTINUED | OUTPATIENT
Start: 2024-06-26 | End: 2024-06-26 | Stop reason: HOSPADM

## 2024-06-26 RX ADMIN — LIDOCAINE HYDROCHLORIDE 5 ML: 10 INJECTION, SOLUTION EPIDURAL; INFILTRATION; INTRACAUDAL; PERINEURAL at 07:46

## 2024-06-26 RX ADMIN — BUPIVACAINE HYDROCHLORIDE 25 MG: 5 INJECTION, SOLUTION EPIDURAL; INTRACAUDAL at 07:46

## 2024-06-26 NOTE — DISCHARGE INSTRUCTIONS
Date: 6/26/2024    DIAGNOSIS  1. Foreign body of right middle finger with infection        MEDICATIONS   Resume all home medications as directed unless otherwise instructed during this hospitalization. If there is any question, double check with your primary care provider.  Start new discharge medications as directed.    Take 1 tablet of 650 mg Tylenol (acetaminophen) Arthritis Strength (extended release) and 1 tablet of Aleve (naproxen) 220 mg in the morning with breakfast and in the evening with dinner.     For breakthrough pain use narcotic pain medication as prescribed.    Do not drive or operate machinery while taking narcotic pain medications.   If you are taking other Tylenol containing medicines at home, be sure NOT to exceed 4 gram's (4000 milligrams) of Tylenol per day.   If you are taking pain medications, be sure to take Colace (docusate sodium) as well to prevent constipation. If constipated, try adding another cathartic or enema.  If nausea and vomiting, call the hospital or seek medical attention.    ACTIVITY   Weight bearing: Non-weight bearing to arm.    DIET  Resume same diet prior to your hospital admission.    WOUND   Leave dressing on until you are seen in clinic for your follow up visit.   Watch for signs and symptoms of infection of your wounds including; pain, redness, swelling, drainage or fever.  If you notice any of these symptoms please call or seek medical attention.    Keep wound clean, dry, and intact.  Do not submerge wounds in water until they are healed. No baths, soaking, swimming, or prolonged water exposure for 4 weeks after surgery.    RETURN   Follow-up with Orthopedic Clinic as directed.     Future Appointments   Date Time Provider Department Center   7/16/2024  2:00 PM Francia Pierce PA-C [facility]       Call the [facility] at [phone] during business hours for any symptoms such as:    * Fevers with Temperature greater than 101.5 degrees.   *  "Pus drainage from wound site.   * Severe pain, not controlled by medication.   * Persistent nausea, vomiting and inablility to tolerate fluids.    If you are receiving care in [place], you may call the Orthopedic clinic at [phone].    FOR URGENT PROBLEMS ONLY, after hours or on weekends call the hospital  at [phone] and ask to speak with the orthopedic resident on call.        [facility]  Home Care Following Your Procedure  Call a doctor if you have signs of infection (fever, growing tenderness at the surgery site, a large amount of drainage or bleeding, severe pain, foul-smelling drainage, redness, swelling).  Today you received a Marcaine or bupivacaine block to numb the nerves near your surgery site.  This is a block using local anesthetic or \"numbing\" medication injected around the nerves to anesthetize or \"numb\" the area supplied by those nerves.  This block is injected into the muscle layer near your surgical site.  The medication may numb the location where you had surgery for 6-18 hours, but may last up to 24 hours.  If your surgical site is an arm or leg you should be careful with your affected limb, since it is possible to injure your limb without being aware of it due to the numbing.  Until full feeling returns, you should guard against bumping or hitting your limb, and avoid extreme hot or cold temperatures on the skin.  As the block wears off, the feeling will return as a tingling or prickly sensation near your surgical site.  You will experience more discomfort from your incision as the feeling returns.  You may want to take a pain pill (a narcotic or Tylenol if this was prescribed by your surgeon) when you start to experience mild pain before the pain becomes more severe.  If your pain medications do not control your pain you should notifiy your surgeon.        Tylenol/Acetaminophen Consumption    If you feel your pain relief is insufficient, " you may take Tylenol/Acetaminophen in addition to your narcotic pain medication.   Be careful not to exceed 4,000 mg of Tylenol/Acetaminophen in a 24 hour period from all sources.  If you are taking extra strength Tylenol/acetaminophen (500 mg), the maximum dose is 8 tablets in 24 hours.  If you are taking regular strength acetaminophen (325 mg), the maximum dose is 12 tablets in 24 hours.      Your doctor is:       Dr. Vernell Moore, Orthopaedics: [phone]             Or dial [phone] and ask for the resident on call for:  Orthopaedics  For emergency care, call the:  [facility]: [phone] (TTY for hearing impaired: [phone])

## 2024-06-26 NOTE — OP NOTE
OPERATIVE REPORT    PATIENT NAME: Micah Kelly  MRN: [id number]    DATE: 6/26/2024    PREOPERATIVE DIAGNOSIS:   1. Right middle finger distal phalanx osteomyelitis    POSTOPERATIVE DIAGNOSIS:   1. Right middle finger distal phalanx osteomyelitis    OPERATION:   1. Right middle finger distal phalanx debridement and irrigation of bone    SURGEON: SHARDA BENZ MD    STAFF: Circulator: Vanessa Mitchell RN  Scrub Person: Araceli Mendez  Staff Assist: Luna Longoria CMA    TYPE OF ANESTHESIA:  Local    IMPLANTS: * No implants in log *    ESTIMATED BLOOD LOSS: 2ml    URINE OUTPUT: n/a - no holguin    TOURNIQUET TIME: 16 minutes     COMPLICATION: None.     INDICATIONS: Micah Kelly is a 69 year old male who sustained a foreign body to the right middle finger, resulting in right middle finger distal phalanx osteomyelitis.  After a thorough evaluation, the patient was indicated for operative intervention. The risks, benefits, and alternatives were discussed with the patient.  The patient verbalized understanding of the treatment plan and signed a written consent.     DESCRIPTION OF PROCEDURE: The base of the middle finger was prepped with alcohol and a digital block using 1% lidocaine with 0.5% bupivacaine was performed. The patient was taken to the operating room and placed the supine position on the operating table. The right hand was prepped and draped in the usual sterile fashion.  A timeout was performed with all OR staff and all were in agreement.     A tournicot was placed at the base of the digit. The distal part of the nail plate was trimmed. A fishmouth incision was made centered at the patient's prior wound. Dissection was carried down to the distal phalanx. There was no purulence but the bone of the tuft of the distal phalanx was soft and was rongeured out and sent for cultures and pathology. Fluoroscopy showed that the affected part of the bone had been excised to normal looking bone. The wound was  thoroughly irrigated. It was loosely reapproximated with 4-0 plain gut suture.  Sterile dressings were applied. The tournicot was removed. Capillary refill intact < 2 seconds. The patient was taken to the recovery room in stable condition.    POSTOPERATIVE PLAN: Bactrim until culture results. Return to clinic in 1 week for wound check.     SHARDA BENZ MD

## 2024-06-27 ENCOUNTER — TELEPHONE (OUTPATIENT)
Dept: INFECTIOUS DISEASES | Facility: CLINIC | Age: 69
End: 2024-06-27
Payer: COMMERCIAL

## 2024-06-27 LAB
PATH REPORT.COMMENTS IMP SPEC: NORMAL
PATH REPORT.COMMENTS IMP SPEC: NORMAL
PATH REPORT.FINAL DX SPEC: NORMAL
PATH REPORT.GROSS SPEC: NORMAL
PATH REPORT.MICROSCOPIC SPEC OTHER STN: NORMAL
PATH REPORT.RELEVANT HX SPEC: NORMAL
PHOTO IMAGE: NORMAL

## 2024-06-27 NOTE — TELEPHONE ENCOUNTER
EP LVM 6/27 to resched New ID visit- 60 minutes with any general ID provider. No show on 6/25 with Dr. Sanchez. Appt needs to be in-person per ID team.     Mel Colindres, Bhavya Wang CMA; Esthela Canales  Caller: Unspecified (2 days ago,  2:38 PM)  Yes! In person needed though          Previous Messages    Appointment  (Newest Message First)  View All Conversations on this Encounter  Mel Colindres RN  You; Bhavya Osborne CMA56 minutes ago (8:38 AM)     BC  Yes! In person needed though     Bhavya Osborne CMA  [facility] Adult [facility] Rn1 hour ago (8:28 AM)     FAMILIA Flores,    Can this patient see anyone since he's a new patient?    Thank you,  Bhavya Jordan CMAYesterday (9:16 AM)     EP  Can he see anyone for virtual since he's a new patient?

## 2024-06-28 LAB — BACTERIA TISS BX CULT: ABNORMAL

## 2024-07-03 LAB — BACTERIA TISS BX CULT: NORMAL

## 2024-07-05 ENCOUNTER — TELEPHONE (OUTPATIENT)
Dept: INFECTIOUS DISEASES | Facility: CLINIC | Age: 69
End: 2024-07-05
Payer: COMMERCIAL

## 2024-07-05 ENCOUNTER — TELEPHONE (OUTPATIENT)
Dept: ORTHOPEDICS | Facility: CLINIC | Age: 69
End: 2024-07-05
Payer: COMMERCIAL

## 2024-07-05 NOTE — TELEPHONE ENCOUNTER
Called and LVM for patient letting him know we are calling to get him rescheduled in ID after he didn't show to his 6/25 appointment. Provided call center number.

## 2024-07-05 NOTE — TELEPHONE ENCOUNTER
Medication Question or concern regarding medication   Prescription Clarification  Name of Medication: pt has completed his Sulfathoraxadol medication as an antibiotic for his finger.  He said there was discussion of having him be on a different antibiotic next.    Prescribing Provider: Dr. Moore   Pharmacy: [facility]   What on the order needs clarification? Pt is feeling fine.  He is asking if he should be starting a 2nd antibiotic.        Could we send this information to you in [employer] or would you prefer to receive a phone call?:   Patient would prefer a phone call   Okay to leave a detailed message?: Yes at Cell number on file:    Telephone Information:   Mobile [phone]

## 2024-07-09 DIAGNOSIS — M86.141 OTHER ACUTE OSTEOMYELITIS OF RIGHT HAND (H): Primary | ICD-10-CM

## 2024-07-09 RX ORDER — SULFAMETHOXAZOLE/TRIMETHOPRIM 800-160 MG
1 TABLET ORAL 2 TIMES DAILY
Qty: 33 TABLET | Refills: 0 | Status: SHIPPED | OUTPATIENT
Start: 2024-07-09

## 2024-07-09 NOTE — TELEPHONE ENCOUNTER
Nurse Syeda GAMING RN in Orthopedics reached out to the Infectious Disease providers on 7-5-24 to ask if the 2nd antibiotic is necessary, since Dr Moore advised for patient to follow up with them re: extending the 2nd round of antibiotics.    7-5-24, Infectious Disease nurse stated:  Hi all,  Unfortunately we are unable to provide recommendations because we still haven't   seen the patient yet in ID. It looks like Roderick no-showed his 6/25 virtual visit with   Dr. Sanchez. On 6/27 our  reached out and left a voicemail for him to   call back and reschedule. I also just called and left another voicemail. We will do   our best to reschedule patient but unsure if we will be able to get him in soon   and it depends on if he calls back. Let us know if you hear from him!    Thanks,  Sarika Singh RN  Infectious Disease on 7/5/2024 at 12:11 PM     Noted that now his next visit with infectious disease is not until 7-25-24.     He does have an office visit scheduled with Francia CHAVIRA on 7-16-24.     Message re-routed to Dr Moore with update and to see if she still wants to wait for infectious disease to address the extension of antibiotic or not. Brigid Simon RN

## 2024-07-16 ENCOUNTER — LAB (OUTPATIENT)
Dept: LAB | Facility: CLINIC | Age: 69
End: 2024-07-16
Payer: COMMERCIAL

## 2024-07-16 ENCOUNTER — OFFICE VISIT (OUTPATIENT)
Dept: ORTHOPEDICS | Facility: CLINIC | Age: 69
End: 2024-07-16
Payer: COMMERCIAL

## 2024-07-16 DIAGNOSIS — Z47.89 AFTERCARE FOLLOWING SURGERY OF THE MUSCULOSKELETAL SYSTEM: ICD-10-CM

## 2024-07-16 DIAGNOSIS — L08.9 FOREIGN BODY OF RIGHT MIDDLE FINGER WITH INFECTION: Primary | ICD-10-CM

## 2024-07-16 DIAGNOSIS — M86.141 OTHER ACUTE OSTEOMYELITIS OF RIGHT HAND (H): ICD-10-CM

## 2024-07-16 DIAGNOSIS — S60.452A FOREIGN BODY OF RIGHT MIDDLE FINGER WITH INFECTION: Primary | ICD-10-CM

## 2024-07-16 LAB
ALBUMIN SERPL BCG-MCNC: 4.2 G/DL (ref 3.5–5.2)
ALP SERPL-CCNC: 86 U/L (ref 40–150)
ALT SERPL W P-5'-P-CCNC: 13 U/L (ref 0–70)
ANION GAP SERPL CALCULATED.3IONS-SCNC: 10 MMOL/L (ref 7–15)
AST SERPL W P-5'-P-CCNC: 25 U/L (ref 0–45)
BILIRUB SERPL-MCNC: 0.8 MG/DL
BUN SERPL-MCNC: 11.9 MG/DL (ref 8–23)
CALCIUM SERPL-MCNC: 9 MG/DL (ref 8.8–10.4)
CHLORIDE SERPL-SCNC: 105 MMOL/L (ref 98–107)
CREAT SERPL-MCNC: 0.72 MG/DL (ref 0.67–1.17)
EGFRCR SERPLBLD CKD-EPI 2021: >90 ML/MIN/1.73M2
ERYTHROCYTE [DISTWIDTH] IN BLOOD BY AUTOMATED COUNT: 13 % (ref 10–15)
GLUCOSE SERPL-MCNC: 87 MG/DL (ref 70–99)
HCO3 SERPL-SCNC: 25 MMOL/L (ref 22–29)
HCT VFR BLD AUTO: 44.4 % (ref 40–53)
HGB BLD-MCNC: 14.8 G/DL (ref 13.3–17.7)
MCH RBC QN AUTO: 31 PG (ref 26.5–33)
MCHC RBC AUTO-ENTMCNC: 33.3 G/DL (ref 31.5–36.5)
MCV RBC AUTO: 93 FL (ref 78–100)
PLATELET # BLD AUTO: 185 10E3/UL (ref 150–450)
POTASSIUM SERPL-SCNC: 4.3 MMOL/L (ref 3.4–5.3)
PROT SERPL-MCNC: 6.7 G/DL (ref 6.4–8.3)
RBC # BLD AUTO: 4.77 10E6/UL (ref 4.4–5.9)
SODIUM SERPL-SCNC: 140 MMOL/L (ref 135–145)
WBC # BLD AUTO: 6.6 10E3/UL (ref 4–11)

## 2024-07-16 PROCEDURE — 80053 COMPREHEN METABOLIC PANEL: CPT | Performed by: PATHOLOGY

## 2024-07-16 PROCEDURE — 99024 POSTOP FOLLOW-UP VISIT: CPT | Performed by: PHYSICIAN ASSISTANT

## 2024-07-16 PROCEDURE — 85027 COMPLETE CBC AUTOMATED: CPT | Performed by: PATHOLOGY

## 2024-07-16 PROCEDURE — 36415 COLL VENOUS BLD VENIPUNCTURE: CPT | Performed by: PATHOLOGY

## 2024-07-16 NOTE — NURSING NOTE
Reason For Visit:   Chief Complaint   Patient presents with    Surgical Followup     Post op  Right middle finger distal phalanx debridement and irrigation of bone DOS: 6/26/24       Primary MD: Gali Kennedy  Ref. MD: Stacy    Age: 69 year old    ?  No      There were no vitals taken for this visit.      Pain Assessment  Patient Currently in Pain: No  0-10 Pain Scale: 1  Primary Pain Location: Finger (Comment which one) (Right middle finger)  Pain Descriptors: Numbness    Hand Dominance Evaluation  Hand Dominance: Right          QuickDASH Assessment       No data to display                   Current Outpatient Medications   Medication Sig Dispense Refill    Ascorbic Acid (VITAMIN C PO) Take 1,000 mg by mouth daily      aspirin (ASA) 81 MG chewable tablet Take 81 mg by mouth      ASPIRIN PO Take 81 mg by mouth daily       folic acid-vit B6-vit B12 (FOLGARD) 0.8-10-0.115 MG TABS per tablet Take by mouth daily      lisinopril (ZESTRIL) 20 MG tablet TAKE 1 TABLET BY MOUTH EVERY DAY 90 tablet 2    sulfamethoxazole-trimethoprim (BACTRIM DS) 800-160 MG tablet Take 1 tablet by mouth 2 times daily 33 tablet 0    sulfamethoxazole-trimethoprim (BACTRIM DS) 800-160 MG tablet Take 1 tablet by mouth 2 times daily 14 tablet 0    VITAMIN D, CHOLECALCIFEROL, PO Take 400 Units by mouth daily         Allergies   Allergen Reactions    Fentanyl Nausea       Dolores Aden, ATC

## 2024-07-16 NOTE — PROGRESS NOTES
Date of Service: Jul 16, 2024    Chief Complaint: Post operative follow up.     Date of Surgery: 6/26/2024    Procedure Performed: Right middle finger distal phalanx debridement and irrigation of bone    Interval events: Micah Kelly is a 69 year old male who presents today for a postoperative follow up.  He unfortunately missed his infectious disease appointment as he was unaware and had difficulty accessing his [employer] messages.  He tells me that he has not been having any fevers, drainage, redness about the finger.    The past medical history was reviewed updated in the EMR. This includes medications, surgeries, social history, and review of systems.    Physical examination:  There were no vitals taken for this visit.  Well-developed, well-nourished and in no acute distress.  Alert and oriented to surroundings.  On examination of the  right middle finger, incision is well-healed. There is no erythema, drainage, or dehiscence. Swelling is Not present. Sensation is intact on the ulnar and radial border of the middle finger.  Patient can actively flex and extend all digits and thumb. The patient is able to make a full composite fist with tip to palm distance of 0 finger-widths. Fingers are warm and well-perfused. Radial pulse is palpable.     Radiographs: No imaging    Assessment: 69 year old male s/p right middle finger distal phalanx debridement and irrigation of bone, progressing appropriately.     Plan: Patient is seen today for postoperative visit.  Overall he is doing well and his finger does show clinical healing without active signs of infection.  He is counseled to avoid soaking his hand but may continue to let water run over his hand and to pat dry his incision area.  He was counseled should he notice redness, drainage, increase in pain to contact our office.  He is counseled to take his antibiotics until his appoint with infectious disease for definitive management.    He will follow-up in 3 weeks  with Dr. Moore for his 6-week postop visit. He was advised of his ID appointment on 7/25/2024.      Francia Pierce PA-C  July 16, 2024 1:55 PM   Orthopaedic Surgery

## 2024-07-16 NOTE — LETTER
7/16/2024      Micah Kelly  [address]  [facility] [address]      Dear Colleague,    Thank you for referring your patient, Micah Kelly, to the [facility] [place]. Please see a copy of my visit note below.    Date of Service: Jul 16, 2024    Chief Complaint: Post operative follow up.     Date of Surgery: 6/26/2024    Procedure Performed: Right middle finger distal phalanx debridement and irrigation of bone    Interval events: Micah Kelly is a 69 year old male who presents today for a postoperative follow up.  He unfortunately missed his infectious disease appointment as he was unaware and had difficulty accessing his [employer] messages.  He tells me that he has not been having any fevers, drainage, redness about the finger.    The past medical history was reviewed updated in the EMR. This includes medications, surgeries, social history, and review of systems.    Physical examination:  There were no vitals taken for this visit.  Well-developed, well-nourished and in no acute distress.  Alert and oriented to surroundings.  On examination of the  right middle finger, incision is well-healed. There is no erythema, drainage, or dehiscence. Swelling is Not present. Sensation is intact on the ulnar and radial border of the middle finger.  Patient can actively flex and extend all digits and thumb. The patient is able to make a full composite fist with tip to palm distance of 0 finger-widths. Fingers are warm and well-perfused. Radial pulse is palpable.     Radiographs: No imaging    Assessment: 69 year old male s/p right middle finger distal phalanx debridement and irrigation of bone, progressing appropriately.     Plan: Patient is seen today for postoperative visit.  Overall he is doing well and his finger does show clinical healing without active signs of infection.  He is counseled to avoid soaking his hand but may continue to let water run over his hand and to pat dry  his incision area.  He was counseled should he notice redness, drainage, increase in pain to contact our office.  He is counseled to take his antibiotics until his appoint with infectious disease for definitive management.    He will follow-up in 3 weeks with Dr. Moore for his 6-week postop visit. He was advised of his ID appointment on 7/25/2024.      Francia Pierce PA-C  July 16, 2024 1:55 PM   Orthopaedic Surgery

## 2024-07-17 ENCOUNTER — TELEPHONE (OUTPATIENT)
Dept: ORTHOPEDICS | Facility: CLINIC | Age: 69
End: 2024-07-17
Payer: COMMERCIAL

## 2024-07-17 NOTE — TELEPHONE ENCOUNTER
[facility] Call Center    Phone Message    May a detailed message be left on voicemail: yes     Reason for Call: Patient asking for Call from  Nurse. She want to Reschedule Her Appt. Doctor don't have any Future appts. Please call Patient . THANKS    Action Taken: Message routed to:  [facility] HELEN    Travel Screening: Not Applicable     Date of Service:

## 2024-07-23 NOTE — PROGRESS NOTES
[facility]  General Infectious Disease/HIV Clinic Note: New Patient     Patient:  Micah Kelly, Date of birth 1955   Medical record number [id number]  Date of Visit:  07/25/2024   Consult requested by Dr. Vernell Moore for evaluation of R middle finger osteomyelitis.     Assessment       Osteomyelitis of the distal phalanx of the R middle finger  OR cultures with E faecalis  Foreign body (wire) in the R middle digit  S/p bedside removal in the ED 6/11/24  S/p surgical debridement 6/26/24  HTN  Prediabetes (HbA1c 5.9%)    Mr. Kelly presents for management of osteomyelitis of the distal phalanx of his R 3rd finger. He had trauma 2/2 foreign body inoculation (stainless steel wire), that likely directly inoculated bacteria into the deep soft tissue and subsequent bone. He received doxycycline initially which would have covered most skin charly, but had persistence/worsening of infection following removal of the majority of the wire (a punctate hyperdensity remained on Xray). This was either the nidus of persistent infection vs E faecalis was present the entire time but not covered by doxycycline (doxycycline intermediate on susceptibility testing). He was not on any antibiotics at the time of surgical debridement so we can trust the culture results. He has been treated with bactrim, but this unfortunately does not cover E faecalis. He has had good healing of the wound following surgery without evidence of acute soft tissue infection which suggests the majority of the infection was debrided. 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 Recommend switching to PO amoxicillin and following a CRP as a surrogate marker for improvement.       "Recommendations     Discontinue bactrim  Start PO amoxicillin 500 mg TID   CRP today and prior to next appointment   Plan for 6 weeks of therapy, pending clinical and lab improvement.     RTC: prior to 6 weeks    I spent a total of 38 minutes on the day of the visit.   Time spent by me doing chart review, history and exam, documentation and further activities per the note    Myrtle Lopez MD  Pager [phone]  Infectious Diseases      History of the Infectious Disease lllness:   CC: R middle finger infection 2/2 retained foreign body    HPI: Roderick Kelly is a pleasant 69 year old gentleman with a past medical history of HTN and prediabetes who presents to ID clinic for evaluation of R middle finger osteomyelitis following a retained foreign body.     Mr. Kelly first presented to care on 6/11 due to one week of pain and swelling at the distal tip of his R middle finger. He states that around 1 week prior to presentation he was building a model and cut a stainless steel wire that \"shot out.\" He didn't feel the wire enter his finger or have significant pain at the time. Over the next few days he developed redness and pain of the finger. Xray showed a 4mm metallic foreign body underneath the nail, with erosive changes of the distal tuft. The foreign body was removed in the ED, but a punctate hyperdensity remained on xray imaging following the procedure. He was given 1 week of PO doxycycline to take after removal. He was referred to ID and Orthopedic Surgery following this, but unfortunately was not seen in ID clinic. He underwent debridement with Dr. Moore on 6/26/24. The tissue was dissected to the level of the distal phalanx, and the bone was noted to be soft. It was sent for culture and pathology. He was started on PO bactrim until cultures resulted and he followed up in ID clinic. The aerobic culture grew E faecalis. Surgical pathology showed bone with acute inflammation. He was continued on bactrim " until his ID appointment today.     He states that currently the wound is healing well. He has no drainage from the wound, minimal erythema, and only minimal tenderness with pressure. He feels that the tip of his finger has some abnormal sensation/numbness, but the function has remained intact. He denies fevers, chills, streaking redness. He has had some weight loss, but this was intentional with dietary changes. He has been taking PO bactrim without significant side effects. He notes some loose stools, but is not bothered by this. He has no antibiotic allergies he is aware of.     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      Relevant Microbiology:   6/26/24 OR culture:   Aerobic: E faecalis  Susceptibility     Enterococcus faecalis     JENNA     Ampicillin <=2 ug/mL Susceptible     Ciprofloxacin 1 ug/mL Susceptible *     Daptomycin 2 ug/mL Susceptible *     Doxycycline 8 ug/mL Intermediate *     Gentamicin Synergy Susceptible... Susceptible 1     Levofloxacin 2 ug/mL Susceptible *     Linezolid 2 ug/mL Susceptible *     Nitrofurantoin <=16 ug/mL Susceptible *     Streptomycin Synergy Susceptible... Susceptible *     Tigecycline <=0.12 ug/mL Susceptible *     Vancomycin 1 ug/mL Susceptible           Anaerobic: NGTD   Fungal: NGTD   AFB: negative smear, culture NGTD      Recent Antimicrobials:   Bactrim 6/26 - present     Doxycycline 6/11 - 6/18    Review of Systems: The remainder of a complete ROS was negative, except as noted in HPI.     Past Medical History:   Diagnosis Date    CARDIOVASCULAR SCREENING; LDL GOAL LESS THAN 160 2/2/2016    Hypertension        Past Surgical History:   Procedure Laterality Date    COLONOSCOPY N/A 12/14/2018    Procedure: COLONOSCOPY;  Surgeon: Rere Calvo MD;  Location: [facility]    IRRIGATION AND DEBRIDEMENT FINGER, COMBINED Right 6/26/2024    Procedure: IRRIGATION AND DEBRIDEMENT, MIDDLE RIGHT FINGER DISTAL PHALANX;  Surgeon: Teresa  MD Vernell;  Location: [facility] OR    VASECTOMY  1986    uncomplicated       Family History   Problem Relation Age of Onset    Colon Cancer Mother 88    Peptic Ulcer Disease Mother     Hypertension Father 98        d age 98 of stroke    Alzheimer Disease Father     Hyperlipidemia Father     Hypothyroidism Father     Kidney Disease Father        Social History     Social History Narrative    Not on file     Social History     Tobacco Use    Smoking status: Never     Passive exposure: Never    Smokeless tobacco: Never   Vaping Use    Vaping status: Never Used   Substance Use Topics    Alcohol use: Yes     Alcohol/week: 1.0 standard drink of alcohol     Types: 1 Standard drinks or equivalent per week     Comment: occasional    Drug use: No       Immunization History   Administered Date(s) Administered    COVID-19 Bivalent 12+ (Pfizer) 11/21/2022    COVID-19 MONOVALENT 12+ (Pfizer) 03/18/2021, 04/08/2021, 12/03/2021    COVID-19 Monovalent 12+ (Pfizer 2022) 05/10/2022    Influenza (High Dose) 3 valent vaccine 01/08/2020, 10/22/2020, 10/26/2022    Influenza (IIV3) PF 02/02/2016    Influenza Vaccine 65+ (FLUAD) 11/08/2023    Influenza Vaccine 65+ (Fluzone HD) 10/22/2020, 09/06/2021, 10/25/2022, 11/08/2023    Influenza Vaccine >6 months,quad, PF 02/05/2018    Pneumococcal 20 valent Conjugate (Prevnar 20) 02/10/2023    Pneumococcal 23 valent 10/01/2021    TDAP (Adacel,Boostrix) 06/11/2024    TDAP Vaccine (Adacel) 02/02/2016    Zoster recombinant adjuvanted (SHINGRIX) 02/20/2023    Zoster vaccine, live 08/11/2017       Patient Active Problem List   Diagnosis    CARDIOVASCULAR SCREENING; LDL GOAL LESS THAN 160    Hypertension goal BP (blood pressure) < 150/90    Right shoulder injury, subsequent encounter    Onychomycosis    IFG (impaired fasting glucose)    Vegan diet    Trigger finger, acquired    Pain of right hand    Foreign body of right middle finger with infection       Current Outpatient Medications   Medication Sig  Dispense Refill    Ascorbic Acid (VITAMIN C PO) Take 1,000 mg by mouth daily      aspirin (ASA) 81 MG chewable tablet Take 81 mg by mouth      ASPIRIN PO Take 81 mg by mouth daily       folic acid-vit B6-vit B12 (FOLGARD) 0.8-10-0.115 MG TABS per tablet Take by mouth daily      lisinopril (ZESTRIL) 20 MG tablet TAKE 1 TABLET BY MOUTH EVERY DAY 90 tablet 2    sulfamethoxazole-trimethoprim (BACTRIM DS) 800-160 MG tablet Take 1 tablet by mouth 2 times daily 33 tablet 0    sulfamethoxazole-trimethoprim (BACTRIM DS) 800-160 MG tablet Take 1 tablet by mouth 2 times daily 14 tablet 0    VITAMIN D, CHOLECALCIFEROL, PO Take 400 Units by mouth daily       No current facility-administered medications for this visit.       Allergies   Allergen Reactions    Fentanyl Nausea          Physical Exam:   /75 (BP Location: Right arm, Patient Position: Sitting, Cuff Size: Adult Regular)   Pulse 79   Temp 97.5  F (36.4  C) (Oral)   Wt 61.2 kg (135 lb)   SpO2 96%   BMI 24.38 kg/m    Wt Readings from Last 4 Encounters:   06/26/24 63 kg (139 lb)   06/11/24 63 kg (139 lb)   06/11/24 63 kg (139 lb)   02/15/24 63 kg (138 lb 14.4 oz)     Exam:    GENERAL: well-developed, well-nourished, alert, oriented, in no acute distress.  HEAD: Head is normocephalic, atraumatic   EYES: Eyes have anicteric sclerae.    ENT: Oropharynx is moist.   LUNGS: Normal WOB on RA.   SKIN: No acute rashes.   R hand: minimal erythema at distal phalanx of 3rd digit, mild TTP, no fluctuance, streaking redness, or open ulceration.   NEUROLOGIC: Grossly nonfocal.         Laboratory Data:     Metabolic Studies    Recent Labs   Lab Test 07/16/24  1431 02/15/24  0805 02/10/23  1228    141 138   POTASSIUM 4.3 4.6 4.7   CHLORIDE 105 104 102   CO2 25 23 26   ANIONGAP 10 14 10   BUN 11.9 14.9 24.8*   CR 0.72 0.78 0.82   GFRESTIMATED >90 >90 >90   GLC 87 89 102*   FILIBERTO 9.0 9.3 9.4     Hepatic Studies    Recent Labs   Lab Test 07/16/24  1431 10/01/21  0957  21  1348   BILITOTAL 0.8 0.8 0.8   ALKPHOS 86 95 83   PROTTOTAL 6.7 7.5 7.1   ALBUMIN 4.2 4.0 3.8   AST 25 22 21   ALT 13 32 27     Hematology Studies     Recent Labs   Lab Test 24  1431 21  1348 12/10/20  1106 18  1620   WBC 6.6 7.3 7.6 6.0   ANEU  --  5.0 4.1 3.1   ALYM  --  1.6 2.1 1.4   ESTER  --  0.6 0.9 1.2   AEOS  --  0.2 0.4 0.3   HGB 14.8 15.2 15.4 14.7   HCT 44.4 45.2 45.9 45.0    202 213 396     Pathology:   24 Surgical Path, R middle finger tissue  Final Diagnosis   Bone, Right middle finger, debridement:  - Bone with focal acute inflammation, fibrosis and reactive changes     Imagin24 Xray R finger  IMPRESSION: There is a 4 mm linear metallic foreign body overlying the  third distal phalangeal tuft. There is an adjacent punctate metallic  foreign body. Surrounding soft tissue swelling. There is erosive  change of the third distal phalangeal tuft which may suggest  osteomyelitis in the appropriate clinical setting. MRI may provide  further evaluation. No acute fracture or malalignment.    24 Xray R finger  IMPRESSION: 3 views of the middle finger. There is a residual punctate hyperdensity within the distal phalanx/tuft of the middle finger. The previously seen linear 4 mm foreign body has been removed. Note is made that there is some cortical irregularity   and erosive change along the distal tuft/phalanx of the middle finger which does raise concern for distal phalangeal osteomyelitis as stated previously.    24 Xray R finger  Impression: Irregularity along the distal tuft of the right third  finger. Tiny punctate nonspecific soft tissue density again noted.

## 2024-07-24 LAB — BACTERIA TISS BX CULT: NO GROWTH

## 2024-07-25 ENCOUNTER — OFFICE VISIT (OUTPATIENT)
Dept: CT IMAGING | Facility: CLINIC | Age: 69
End: 2024-07-25
Attending: INTERNAL MEDICINE
Payer: COMMERCIAL

## 2024-07-25 ENCOUNTER — LAB (OUTPATIENT)
Dept: LAB | Facility: CLINIC | Age: 69
End: 2024-07-25
Payer: COMMERCIAL

## 2024-07-25 VITALS
OXYGEN SATURATION: 96 % | BODY MASS INDEX: 24.38 KG/M2 | HEART RATE: 79 BPM | WEIGHT: 135 LBS | DIASTOLIC BLOOD PRESSURE: 75 MMHG | SYSTOLIC BLOOD PRESSURE: 108 MMHG | TEMPERATURE: 97.5 F

## 2024-07-25 DIAGNOSIS — M86.141 OTHER ACUTE OSTEOMYELITIS OF RIGHT HAND (H): Primary | ICD-10-CM

## 2024-07-25 DIAGNOSIS — M86.141 OTHER ACUTE OSTEOMYELITIS OF RIGHT HAND (H): ICD-10-CM

## 2024-07-25 LAB — CRP SERPL-MCNC: <3 MG/L

## 2024-07-25 PROCEDURE — 86140 C-REACTIVE PROTEIN: CPT

## 2024-07-25 PROCEDURE — 36415 COLL VENOUS BLD VENIPUNCTURE: CPT

## 2024-07-25 PROCEDURE — 99203 OFFICE O/P NEW LOW 30 MIN: CPT | Mod: 24 | Performed by: INTERNAL MEDICINE

## 2024-07-25 PROCEDURE — G0463 HOSPITAL OUTPT CLINIC VISIT: HCPCS | Performed by: INTERNAL MEDICINE

## 2024-07-25 RX ORDER — AMOXICILLIN 500 MG/1
500 CAPSULE ORAL 3 TIMES DAILY
Qty: 126 CAPSULE | Refills: 0 | Status: SHIPPED | OUTPATIENT
Start: 2024-07-25 | End: 2024-09-05

## 2024-07-25 ASSESSMENT — PAIN SCALES - GENERAL: PAINLEVEL: NO PAIN (0)

## 2024-07-25 NOTE — NURSING NOTE
Chief Complaint   Patient presents with    Consult     /75 (BP Location: Right arm, Patient Position: Sitting, Cuff Size: Adult Regular)   Pulse 79   Temp 97.5  F (36.4  C) (Oral)   Wt 61.2 kg (135 lb)   SpO2 96%   BMI 24.38 kg/m

## 2024-08-06 NOTE — PROGRESS NOTES
Chief Complaint:   Chief Complaint   Patient presents with    Surgical Followup     Post op Right middle finger distal phalanx debridement and irrigation of bone. DOS: 6/26/24     Referring Physician: Ermelinda Vitale    Date of Injury: early June 2024  Mechanism of Injury: foreign body while modeling  Diagnosis: right middle finger distal phalanx osteomyelitis  Treatment: right middle finger retained foreign body removal in ER 6/11/2024  Date of Surgery: 6/26/2024  Procedure Performed: Right middle finger distal phalanx debridement and irrigation of bone    History of Present Illness: Micah Kelly is a 69 year old RHD male presenting for evaluation of right middle finger infection after foreign body. The FB was removed and he was given doxycycline in the ER. His symptoms Have improved but he does still have pain in the middle fingertip. His doxycycline ended yesterday. No fevers or chills, no pus.    Clinical documentation by CAIT Covington on 6/11/2024 was reviewed.    7/16/2024: seen by CAIT Pierce. Missed ID appointment. Was doing well at that time.    8/14/2024: seen by ID, found to have E. Faecalis, being treated with PO amoxicillin x6 weeks; no pain, no drainage or other wound issues    Occupation: mostly retired, works part time as a musician playing the bass guitar    Physical Examination:  There were no vitals filed for this visit.  There is no height or weight on file to calculate BMI.    Well appearing, well nourished  Alert and oriented x 3, cooperative with exam     Right middle finger  Wound well healed, no erythema  Able to flex and extend middle finger, full extension at PIP/MCP joints, few degree extension lag at the DIP joint  Mild sensitivity at the tip of the digit  Motor Exam: Intact TU/OK/x2-3  Sensory Exam: Sensation intact to light touch in FDWS (radial), volar IF (median), volar SF (ulnar)  Vascular Exam: fingers wwp    Imaging/Studies:  CRP 7/25/2024: <3    OR cultures/pathology  6/26/2024:   - aerobic: Enterococcus faecalis  Bone, Right middle finger, debridement:  - Bone with focal acute inflammation, fibrosis and reactive changes    XR (3 views) of the right middle finger was obtained 6/19/2024.  I reviewed the images with the patient.  The imaging study shows increased erosion of the distal phalanx tuft.    XR (3 views) of the right middle finger was obtained  6/11/2024 .  I reviewed the images with the patient.  The imaging study shows a punctate <1mm foreign body at the tip of the digit, and a linear foreign body at the tip of the digit -- the linear FB was removed. There is an erosion at the tuft of the distal phalanx which maybe consistent with osteomyelitis    Assessment: Micah Kelly is a 69 year old male with right middle finger distal phalanx osteomyelitis (foreign body has since been removed), now s/p distal phalanx debridement.    Plan:   I had a discussion with the patient regarding my clinical findings, diagnosis, and treatment plan. We reviewed the post-operative plan, frequency of follow-up, rehabilitation, and expected outcomes. His infection appears to be resolving as he is no longer having pain, his wound has healed, he is on the appropriate abx through ID, and CRP has normalized. We should check him one more time in a couple months with an xray. All questions answered.     WBAT right upper extremity.   OK to wash hands/shower/submerge finger.   Reviewed digit/wrist/forearm ROM exercises.     Next Visit:   Follow-up: 2 month(s).   Imaging: XR right middle finger   Plan: continue abx.    Pedro Salinas MD ortho PGY4     I have seen and evaluated this patient and agree with the documentation.  SHARDA BENZ MD

## 2024-08-14 ENCOUNTER — OFFICE VISIT (OUTPATIENT)
Dept: ORTHOPEDICS | Facility: CLINIC | Age: 69
End: 2024-08-14
Payer: COMMERCIAL

## 2024-08-14 DIAGNOSIS — S60.452A FOREIGN BODY OF RIGHT MIDDLE FINGER WITH INFECTION: Primary | ICD-10-CM

## 2024-08-14 DIAGNOSIS — L08.9 FOREIGN BODY OF RIGHT MIDDLE FINGER WITH INFECTION: Primary | ICD-10-CM

## 2024-08-14 PROCEDURE — 99024 POSTOP FOLLOW-UP VISIT: CPT | Performed by: STUDENT IN AN ORGANIZED HEALTH CARE EDUCATION/TRAINING PROGRAM

## 2024-08-14 NOTE — NURSING NOTE
Reason For Visit:   Chief Complaint   Patient presents with    Surgical Followup     Post op Right middle finger distal phalanx debridement and irrigation of bone. DOS: 6/26/24       Primary MD: Gali Kennedy  Ref. MD: Stacy    Age: 69 year old    ?  No      There were no vitals taken for this visit.      Pain Assessment  Patient Currently in Pain: No    Hand Dominance Evaluation  Hand Dominance: Right          QuickDASH Assessment       No data to display                   Current Outpatient Medications   Medication Sig Dispense Refill    amoxicillin (AMOXIL) 500 MG capsule Take 1 capsule (500 mg) by mouth 3 times daily for 42 days 126 capsule 0    Ascorbic Acid (VITAMIN C PO) Take 1,000 mg by mouth daily      aspirin (ASA) 81 MG chewable tablet Take 81 mg by mouth      ASPIRIN PO Take 81 mg by mouth daily       folic acid-vit B6-vit B12 (FOLGARD) 0.8-10-0.115 MG TABS per tablet Take by mouth daily      lisinopril (ZESTRIL) 20 MG tablet TAKE 1 TABLET BY MOUTH EVERY DAY 90 tablet 2    sulfamethoxazole-trimethoprim (BACTRIM DS) 800-160 MG tablet Take 1 tablet by mouth 2 times daily 33 tablet 0    sulfamethoxazole-trimethoprim (BACTRIM DS) 800-160 MG tablet Take 1 tablet by mouth 2 times daily (Patient not taking: Reported on 7/25/2024) 14 tablet 0    VITAMIN D, CHOLECALCIFEROL, PO Take 400 Units by mouth daily         Allergies   Allergen Reactions    Fentanyl Nausea       Dolores Aden, ATC

## 2024-08-14 NOTE — LETTER
8/14/2024      Micah Kelly  [address]  [facility] [address]      Dear Colleague,    Thank you for referring your patient, Micah Kelly, to the [facility] [place]. Please see a copy of my visit note below.    Chief Complaint:   Chief Complaint   Patient presents with     Surgical Followup     Post op Right middle finger distal phalanx debridement and irrigation of bone. DOS: 6/26/24     Referring Physician: Ermelinda Vitale    Date of Injury: early June 2024  Mechanism of Injury: foreign body while modeling  Diagnosis: right middle finger distal phalanx osteomyelitis  Treatment: right middle finger retained foreign body removal in ER 6/11/2024  Date of Surgery: 6/26/2024  Procedure Performed: Right middle finger distal phalanx debridement and irrigation of bone    History of Present Illness: Micah Kelly is a 69 year old RHD male presenting for evaluation of right middle finger infection after foreign body. The FB was removed and he was given doxycycline in the ER. His symptoms Have improved but he does still have pain in the middle fingertip. His doxycycline ended yesterday. No fevers or chills, no pus.    Clinical documentation by CAIT Covington on 6/11/2024 was reviewed.    7/16/2024: seen by CAIT Pierce. Missed ID appointment. Was doing well at that time.    8/14/2024: seen by ID, found to have E. Faecalis, being treated with PO amoxicillin x6 weeks; no pain, no drainage or other wound issues    Occupation: mostly retired, works part time as a musician playing the bass guitar    Physical Examination:  There were no vitals filed for this visit.  There is no height or weight on file to calculate BMI.    Well appearing, well nourished  Alert and oriented x 3, cooperative with exam     Right middle finger  Wound well healed, no erythema  Able to flex and extend middle finger, full extension at PIP/MCP joints, few degree extension lag at the DIP joint  Mild sensitivity  at the tip of the digit  Motor Exam: Intact TU/OK/x2-3  Sensory Exam: Sensation intact to light touch in FDWS (radial), volar IF (median), volar SF (ulnar)  Vascular Exam: fingers wwp    Imaging/Studies:  CRP 7/25/2024: <3    OR cultures/pathology 6/26/2024:   - aerobic: Enterococcus faecalis  Bone, Right middle finger, debridement:  - Bone with focal acute inflammation, fibrosis and reactive changes    XR (3 views) of the right middle finger was obtained 6/19/2024.  I reviewed the images with the patient.  The imaging study shows increased erosion of the distal phalanx tuft.    XR (3 views) of the right middle finger was obtained  6/11/2024 .  I reviewed the images with the patient.  The imaging study shows a punctate <1mm foreign body at the tip of the digit, and a linear foreign body at the tip of the digit -- the linear FB was removed. There is an erosion at the tuft of the distal phalanx which maybe consistent with osteomyelitis    Assessment: Micah Kelly is a 69 year old male with right middle finger distal phalanx osteomyelitis (foreign body has since been removed), now s/p distal phalanx debridement.    Plan:   I had a discussion with the patient regarding my clinical findings, diagnosis, and treatment plan. We reviewed the post-operative plan, frequency of follow-up, rehabilitation, and expected outcomes. His infection appears to be resolving as he is no longer having pain, his wound has healed, he is on the appropriate abx through ID, and CRP has normalized. We should check him one more time in a couple months with an xray. All questions answered.      WBAT right upper extremity.    OK to wash hands/shower/submerge finger.    Reviewed digit/wrist/forearm ROM exercises.     Next Visit:    Follow-up: 2 month(s).    Imaging: XR right middle finger    Plan: continue abx.    Pedro Salinas MD ortho PGY4     I have seen and evaluated this patient and agree with the documentation.  SHARDA BENZ,  MD      Again, thank you for allowing me to participate in the care of your patient.        Sincerely,        SHARDA BENZ MD

## 2024-08-21 LAB
ACID FAST STAIN (ARUP): NORMAL

## 2024-08-21 NOTE — PROGRESS NOTES
[facility]  General Infectious Disease Clinic Note: Follow Up     Patient:  Micah Kelly, Date of birth 1955   Medical record number [id number]  Date of Visit:  08/23/2024      Assessment       Osteomyelitis of the distal phalanx of the R middle finger  OR cultures with E faecalis  Foreign body (wire) in the R middle digit  S/p bedside removal in the ED 6/11/24  S/p surgical debridement 6/26/24  HTN  Prediabetes (HbA1c 5.9%)    Mr. Kelly presents to ID clinic for follow up of his E faecalis osteomyelitis of the distal phalanx of the R middle finger. He has been on PO amoxicillin for 4 weeks and has been tolerating it very well. The wound has healed and he does not have any concerning exam findings. As his CRP was undetectable a month ago, continuing to monitor it will not be clinically useful. Plan to continue antibiotic therapy for 6 weeks (he has supply at home) and continue to monitor the area closely. He also has follow up with his hand surgeon which will be useful for monitoring. He should let me know if he has any concerns with the antibiotic or worsening infection in the future.      Recommendations     Complete 6 week course of PO amoxicillin.   No labs today.     RTC: PRN with concerns    I spent a total of 16 minutes on the day of the visit.   Time spent by me doing chart review, history and exam, documentation and further activities per the note    Myrtle Lopez MD  Pager [phone]  Infectious Diseases     Interval Updates:   - 8/14 saw Orthopedic Surgery, no concerns and healing well. Plan for 2 month follow up.   - Today, Mr. Kelly states that he is doing very well. He has been taking the amoxicillin as prescribed, 3 times a day with food. No missed doses. No GI upset or rashes. Has the full supply at home, a few more weeks left.   - No pain in finger from the infection. No drainage or ulceration. Does note some pain from trigger finger that was present prior  "to the injury.   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 He has been able to play without issue (aside from the trigger finger).      History of the Infectious Disease lllness:   CC: R middle finger infection 2/2 retained foreign body     HPI: Roderick Kelly is a pleasant 69 year old gentleman with a past medical history of HTN and prediabetes who presents to ID clinic for evaluation of R middle finger osteomyelitis following a retained foreign body.      Mr. Kelly first presented to care on 6/11 due to one week of pain and swelling at the distal tip of his R middle finger. He states that around 1 week prior to presentation he was building a model and cut a stainless steel wire that \"shot out.\" He didn't feel the wire enter his finger or have significant pain at the time. Over the next few days he developed redness and pain of the finger. Xray showed a 4mm metallic foreign body underneath the nail, with erosive changes of the distal tuft. The foreign body was removed in the ED, but a punctate hyperdensity remained on xray imaging following the procedure. He was given 1 week of PO doxycycline to take after removal. He was referred to ID and Orthopedic Surgery following this, but unfortunately was not seen in ID clinic. He underwent debridement with Dr. Moore on 6/26/24. The tissue was dissected to the level of the distal phalanx, and the bone was noted to be soft. It was sent for culture and pathology. He was started on PO bactrim until cultures resulted and he followed up in ID clinic. The aerobic culture grew E faecalis. Surgical pathology showed bone with acute inflammation. He was continued on bactrim until his ID appointment 7/25/24. At that time, I switched him to PO amoxicillin 500 mg TID for a planned 6 weeks. CRP obtained " after the visit was undetectable.       Relevant Microbiology:   6/26/24 OR culture:               Aerobic: E faecalis  Susceptibility              Enterococcus faecalis       JENNA       Ampicillin <=2 ug/mL Susceptible       Ciprofloxacin 1 ug/mL Susceptible *       Daptomycin 2 ug/mL Susceptible *       Doxycycline 8 ug/mL Intermediate *       Gentamicin Synergy Susceptible... Susceptible 1       Levofloxacin 2 ug/mL Susceptible *       Linezolid 2 ug/mL Susceptible *       Nitrofurantoin <=16 ug/mL Susceptible *       Streptomycin Synergy Susceptible... Susceptible *       Tigecycline <=0.12 ug/mL Susceptible *       Vancomycin 1 ug/mL Susceptible                        Anaerobic: NGTD               Fungal: NGTD               AFB: negative smear, culture NGTD     Recent Antimicrobials:   Amoxicillin 7/25 - present     Bactrim 6/26 - 7/25   Doxycycline 6/11 - 6/18    Review of Systems: The remainder of a complete ROS was negative, except as noted in HPI.     Past Medical History:   Diagnosis Date    CARDIOVASCULAR SCREENING; LDL GOAL LESS THAN 160 2/2/2016    Hypertension        Past Surgical History:   Procedure Laterality Date    COLONOSCOPY N/A 12/14/2018    Procedure: COLONOSCOPY;  Surgeon: Rere Calvo MD;  Location:  GI    IRRIGATION AND DEBRIDEMENT FINGER, COMBINED Right 6/26/2024    Procedure: IRRIGATION AND DEBRIDEMENT, MIDDLE RIGHT FINGER DISTAL PHALANX;  Surgeon: Vernell Moore MD;  Location: [facility] OR    VASECTOMY  1986    uncomplicated       Immunization History   Administered Date(s) Administered    COVID-19 Bivalent 12+ (Pfizer) 11/21/2022    COVID-19 MONOVALENT 12+ (Pfizer) 03/18/2021, 04/08/2021, 12/03/2021    COVID-19 Monovalent 12+ (Pfizer 2022) 05/10/2022    Influenza (High Dose) 3 valent vaccine 01/08/2020, 10/22/2020, 10/26/2022    Influenza (IIV3) PF 02/02/2016    Influenza Vaccine 65+ (FLUAD) 11/08/2023    Influenza Vaccine 65+ (Fluzone HD) 10/22/2020, 09/06/2021, 10/25/2022,  11/08/2023    Influenza Vaccine >6 months,quad, PF 02/05/2018    Pneumococcal 20 valent Conjugate (Prevnar 20) 02/10/2023    Pneumococcal 23 valent 10/01/2021    TDAP (Adacel,Boostrix) 06/11/2024    TDAP Vaccine (Adacel) 02/02/2016    Zoster recombinant adjuvanted (SHINGRIX) 02/20/2023    Zoster vaccine, live 08/11/2017       Patient Active Problem List   Diagnosis    CARDIOVASCULAR SCREENING; LDL GOAL LESS THAN 160    Hypertension goal BP (blood pressure) < 150/90    Right shoulder injury, subsequent encounter    Onychomycosis    IFG (impaired fasting glucose)    Vegan diet    Trigger finger, acquired    Pain of right hand    Foreign body of right middle finger with infection       Current Outpatient Medications   Medication Sig Dispense Refill    amoxicillin (AMOXIL) 500 MG capsule Take 1 capsule (500 mg) by mouth 3 times daily for 42 days 126 capsule 0    Ascorbic Acid (VITAMIN C PO) Take 1,000 mg by mouth daily      aspirin (ASA) 81 MG chewable tablet Take 81 mg by mouth      ASPIRIN PO Take 81 mg by mouth daily       folic acid-vit B6-vit B12 (FOLGARD) 0.8-10-0.115 MG TABS per tablet Take by mouth daily      lisinopril (ZESTRIL) 20 MG tablet TAKE 1 TABLET BY MOUTH EVERY DAY 90 tablet 2    sulfamethoxazole-trimethoprim (BACTRIM DS) 800-160 MG tablet Take 1 tablet by mouth 2 times daily 33 tablet 0    sulfamethoxazole-trimethoprim (BACTRIM DS) 800-160 MG tablet Take 1 tablet by mouth 2 times daily (Patient not taking: Reported on 7/25/2024) 14 tablet 0    VITAMIN D, CHOLECALCIFEROL, PO Take 400 Units by mouth daily       No current facility-administered medications for this visit.       Allergies   Allergen Reactions    Fentanyl Nausea            Physical Exam:   /74 (BP Location: Left arm, Cuff Size: Adult Regular)   Pulse 93   Temp 97.5  F (36.4  C)   Wt 62.1 kg (137 lb)   BMI 24.74 kg/m    Wt Readings from Last 4 Encounters:   07/25/24 61.2 kg (135 lb)   06/26/24 63 kg (139 lb)   06/11/24 63 kg (139  lb)   06/11/24 63 kg (139 lb)     Exam:   Exam:    GENERAL: well-developed, well-nourished, alert, oriented, in no acute distress.  HEAD: Head is normocephalic, atraumatic   EYES: Eyes have anicteric sclerae.    ENT: Oropharynx is moist.   LUNGS: Normal WOB on RA.   SKIN: No acute rashes.   R hand: minimal erythema at distal phalanx of 3rd digit that blanches, no TTP, no fluctuance, streaking redness, or open ulceration.   NEUROLOGIC: Grossly nonfocal.         Laboratory Data:   Metabolic Studies    Recent Labs   Lab Test 07/16/24  1431 02/15/24  0805 02/10/23  1228    141 138   POTASSIUM 4.3 4.6 4.7   CHLORIDE 105 104 102   CO2 25 23 26   ANIONGAP 10 14 10   BUN 11.9 14.9 24.8*   CR 0.72 0.78 0.82   GFRESTIMATED >90 >90 >90   GLC 87 89 102*   FILIBERTO 9.0 9.3 9.4     Hepatic Studies    Recent Labs   Lab Test 07/16/24  1431 10/01/21  0933 02/02/21  1348   BILITOTAL 0.8 0.8 0.8   ALKPHOS 86 95 83   PROTTOTAL 6.7 7.5 7.1   ALBUMIN 4.2 4.0 3.8   AST 25 22 21   ALT 13 32 27     Hematology Studies     Recent Labs   Lab Test 07/16/24  1431 02/02/21  1348 12/10/20  1106 01/17/18  1620   WBC 6.6 7.3 7.6 6.0   ANEU  --  5.0 4.1 3.1   ALYM  --  1.6 2.1 1.4   ESTER  --  0.6 0.9 1.2   AEOS  --  0.2 0.4 0.3   HGB 14.8 15.2 15.4 14.7   HCT 44.4 45.2 45.9 45.0    202 213 396     Imaging:   Results for orders placed or performed in visit on 06/26/24   XR Surgery LAUREEN L/T 5 Min Fluoro w Stills    Narrative    This exam was marked as non-reportable because it will not be read by a   radiologist or a [place] non-radiologist provider.

## 2024-08-23 ENCOUNTER — OFFICE VISIT (OUTPATIENT)
Dept: INFECTIOUS DISEASES | Facility: CLINIC | Age: 69
End: 2024-08-23
Attending: INTERNAL MEDICINE
Payer: COMMERCIAL

## 2024-08-23 VITALS
DIASTOLIC BLOOD PRESSURE: 74 MMHG | HEART RATE: 93 BPM | SYSTOLIC BLOOD PRESSURE: 104 MMHG | WEIGHT: 137 LBS | TEMPERATURE: 97.5 F | BODY MASS INDEX: 24.74 KG/M2

## 2024-08-23 DIAGNOSIS — L08.9 FOREIGN BODY OF RIGHT MIDDLE FINGER WITH INFECTION: Primary | ICD-10-CM

## 2024-08-23 DIAGNOSIS — S60.452A FOREIGN BODY OF RIGHT MIDDLE FINGER WITH INFECTION: Primary | ICD-10-CM

## 2024-08-23 PROCEDURE — 99212 OFFICE O/P EST SF 10 MIN: CPT | Mod: 24 | Performed by: INTERNAL MEDICINE

## 2024-08-23 PROCEDURE — G0463 HOSPITAL OUTPT CLINIC VISIT: HCPCS | Performed by: INTERNAL MEDICINE

## 2024-08-23 ASSESSMENT — PAIN SCALES - GENERAL: PAINLEVEL: NO PAIN (0)

## 2024-08-23 NOTE — LETTER
8/23/2024       RE: Micah Kelly  [address]  [place] [address]     Dear Colleague,    Thank you for referring your patient, Micah Kelly, to the [facility] INFECTIOUS DISEASE CLINIC [place] at [facility]. Please see a copy of my visit note below.    [facility]  General Infectious Disease Clinic Note: Follow Up     Patient:  Micah Kelly, Date of birth 1955   Medical record number [id number]  Date of Visit:  08/23/2024      Assessment       Osteomyelitis of the distal phalanx of the R middle finger  OR cultures with E faecalis  Foreign body (wire) in the R middle digit  S/p bedside removal in the ED 6/11/24  S/p surgical debridement 6/26/24  HTN  Prediabetes (HbA1c 5.9%)    Mr. Kelly presents to ID clinic for follow up of his E faecalis osteomyelitis of the distal phalanx of the R middle finger. He has been on PO amoxicillin for 4 weeks and has been tolerating it very well. The wound has healed and he does not have any concerning exam findings. As his CRP was undetectable a month ago, continuing to monitor it will not be clinically useful. Plan to continue antibiotic therapy for 6 weeks (he has supply at home) and continue to monitor the area closely. He also has follow up with his hand surgeon which will be useful for monitoring. He should let me know if he has any concerns with the antibiotic or worsening infection in the future.      Recommendations     Complete 6 week course of PO amoxicillin.   No labs today.     RTC: PRN with concerns    I spent a total of 16 minutes on the day of the visit.   Time spent by me doing chart review, history and exam, documentation and further activities per the note    Myrtle Lopez MD  Pager [phone]  Infectious Diseases     Interval Updates:   - 8/14 saw Orthopedic Surgery, no concerns and healing well. Plan for 2 month follow up.   - Today, Mr. Kelly states  "that he is doing very well. He has been taking the amoxicillin as prescribed, 3 times a day with food. No missed doses. No GI upset or rashes. Has the full supply at home, a few more weeks left.   - No pain in finger from the infection. No drainage or ulceration. Does note some pain from trigger finger that was present prior to the injury.   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 He has been able to play without issue (aside from the trigger finger).      History of the Infectious Disease lllness:   CC: R middle finger infection 2/2 retained foreign body     HPI: Roderick Kelly is a pleasant 69 year old gentleman with a past medical history of HTN and prediabetes who presents to ID clinic for evaluation of R middle finger osteomyelitis following a retained foreign body.      Mr. Kelly first presented to care on 6/11 due to one week of pain and swelling at the distal tip of his R middle finger. He states that around 1 week prior to presentation he was building a model and cut a stainless steel wire that \"shot out.\" He didn't feel the wire enter his finger or have significant pain at the time. Over the next few days he developed redness and pain of the finger. Xray showed a 4mm metallic foreign body underneath the nail, with erosive changes of the distal tuft. The foreign body was removed in the ED, but a punctate hyperdensity remained on xray imaging following the procedure. He was given 1 week of PO doxycycline to take after removal. He was referred to ID and Orthopedic Surgery following this, but unfortunately was not seen in ID clinic. He underwent debridement with Dr. Moore on 6/26/24. The tissue was dissected to the level of the distal phalanx, and the bone was noted to be soft. It was sent for culture and pathology. He was " started on PO bactrim until cultures resulted and he followed up in ID clinic. The aerobic culture grew E faecalis. Surgical pathology showed bone with acute inflammation. He was continued on bactrim until his ID appointment 7/25/24. At that time, I switched him to PO amoxicillin 500 mg TID for a planned 6 weeks. CRP obtained after the visit was undetectable.       Relevant Microbiology:   6/26/24 OR culture:               Aerobic: E faecalis  Susceptibility              Enterococcus faecalis       JENNA       Ampicillin <=2 ug/mL Susceptible       Ciprofloxacin 1 ug/mL Susceptible *       Daptomycin 2 ug/mL Susceptible *       Doxycycline 8 ug/mL Intermediate *       Gentamicin Synergy Susceptible... Susceptible 1       Levofloxacin 2 ug/mL Susceptible *       Linezolid 2 ug/mL Susceptible *       Nitrofurantoin <=16 ug/mL Susceptible *       Streptomycin Synergy Susceptible... Susceptible *       Tigecycline <=0.12 ug/mL Susceptible *       Vancomycin 1 ug/mL Susceptible                        Anaerobic: NGTD               Fungal: NGTD               AFB: negative smear, culture NGTD     Recent Antimicrobials:   Amoxicillin 7/25 - present     Bactrim 6/26 - 7/25   Doxycycline 6/11 - 6/18    Review of Systems: The remainder of a complete ROS was negative, except as noted in HPI.     Past Medical History:   Diagnosis Date     CARDIOVASCULAR SCREENING; LDL GOAL LESS THAN 160 2/2/2016     Hypertension        Past Surgical History:   Procedure Laterality Date     COLONOSCOPY N/A 12/14/2018    Procedure: COLONOSCOPY;  Surgeon: Rere Calvo MD;  Location:  GI     IRRIGATION AND DEBRIDEMENT FINGER, COMBINED Right 6/26/2024    Procedure: IRRIGATION AND DEBRIDEMENT, MIDDLE RIGHT FINGER DISTAL PHALANX;  Surgeon: Vernell Moore MD;  Location: [facility] OR     VASECTOMY  1986    uncomplicated       Immunization History   Administered Date(s) Administered     COVID-19 Bivalent 12+ (Pfizer) 11/21/2022     COVID-19 MONOVALENT  12+ (Pfizer) 03/18/2021, 04/08/2021, 12/03/2021     COVID-19 Monovalent 12+ (Pfizer 2022) 05/10/2022     Influenza (High Dose) 3 valent vaccine 01/08/2020, 10/22/2020, 10/26/2022     Influenza (IIV3) PF 02/02/2016     Influenza Vaccine 65+ (FLUAD) 11/08/2023     Influenza Vaccine 65+ (Fluzone HD) 10/22/2020, 09/06/2021, 10/25/2022, 11/08/2023     Influenza Vaccine >6 months,quad, PF 02/05/2018     Pneumococcal 20 valent Conjugate (Prevnar 20) 02/10/2023     Pneumococcal 23 valent 10/01/2021     TDAP (Adacel,Boostrix) 06/11/2024     TDAP Vaccine (Adacel) 02/02/2016     Zoster recombinant adjuvanted (SHINGRIX) 02/20/2023     Zoster vaccine, live 08/11/2017       Patient Active Problem List   Diagnosis     CARDIOVASCULAR SCREENING; LDL GOAL LESS THAN 160     Hypertension goal BP (blood pressure) < 150/90     Right shoulder injury, subsequent encounter     Onychomycosis     IFG (impaired fasting glucose)     Vegan diet     Trigger finger, acquired     Pain of right hand     Foreign body of right middle finger with infection       Current Outpatient Medications   Medication Sig Dispense Refill     amoxicillin (AMOXIL) 500 MG capsule Take 1 capsule (500 mg) by mouth 3 times daily for 42 days 126 capsule 0     Ascorbic Acid (VITAMIN C PO) Take 1,000 mg by mouth daily       aspirin (ASA) 81 MG chewable tablet Take 81 mg by mouth       ASPIRIN PO Take 81 mg by mouth daily        folic acid-vit B6-vit B12 (FOLGARD) 0.8-10-0.115 MG TABS per tablet Take by mouth daily       lisinopril (ZESTRIL) 20 MG tablet TAKE 1 TABLET BY MOUTH EVERY DAY 90 tablet 2     sulfamethoxazole-trimethoprim (BACTRIM DS) 800-160 MG tablet Take 1 tablet by mouth 2 times daily 33 tablet 0     sulfamethoxazole-trimethoprim (BACTRIM DS) 800-160 MG tablet Take 1 tablet by mouth 2 times daily (Patient not taking: Reported on 7/25/2024) 14 tablet 0     VITAMIN D, CHOLECALCIFEROL, PO Take 400 Units by mouth daily       No current facility-administered  medications for this visit.       Allergies   Allergen Reactions     Fentanyl Nausea            Physical Exam:   /74 (BP Location: Left arm, Cuff Size: Adult Regular)   Pulse 93   Temp 97.5  F (36.4  C)   Wt 62.1 kg (137 lb)   BMI 24.74 kg/m    Wt Readings from Last 4 Encounters:   07/25/24 61.2 kg (135 lb)   06/26/24 63 kg (139 lb)   06/11/24 63 kg (139 lb)   06/11/24 63 kg (139 lb)     Exam:   Exam:    GENERAL: well-developed, well-nourished, alert, oriented, in no acute distress.  HEAD: Head is normocephalic, atraumatic   EYES: Eyes have anicteric sclerae.    ENT: Oropharynx is moist.   LUNGS: Normal WOB on RA.   SKIN: No acute rashes.   R hand: minimal erythema at distal phalanx of 3rd digit that blanches, no TTP, no fluctuance, streaking redness, or open ulceration.   NEUROLOGIC: Grossly nonfocal.         Laboratory Data:   Metabolic Studies    Recent Labs   Lab Test 07/16/24  1431 02/15/24  0805 02/10/23  1228    141 138   POTASSIUM 4.3 4.6 4.7   CHLORIDE 105 104 102   CO2 25 23 26   ANIONGAP 10 14 10   BUN 11.9 14.9 24.8*   CR 0.72 0.78 0.82   GFRESTIMATED >90 >90 >90   GLC 87 89 102*   FILIBERTO 9.0 9.3 9.4     Hepatic Studies    Recent Labs   Lab Test 07/16/24  1431 10/01/21  0933 02/02/21  1348   BILITOTAL 0.8 0.8 0.8   ALKPHOS 86 95 83   PROTTOTAL 6.7 7.5 7.1   ALBUMIN 4.2 4.0 3.8   AST 25 22 21   ALT 13 32 27     Hematology Studies     Recent Labs   Lab Test 07/16/24  1431 02/02/21  1348 12/10/20  1106 01/17/18  1620   WBC 6.6 7.3 7.6 6.0   ANEU  --  5.0 4.1 3.1   ALYM  --  1.6 2.1 1.4   ESTER  --  0.6 0.9 1.2   AEOS  --  0.2 0.4 0.3   HGB 14.8 15.2 15.4 14.7   HCT 44.4 45.2 45.9 45.0    202 213 396     Imaging:   Results for orders placed or performed in visit on 06/26/24   XR Surgery LAUREEN L/T 5 Min Fluoro w Stills    Narrative    This exam was marked as non-reportable because it will not be read by a   radiologist or a [place] non-radiologist provider.                Again, thank you  for allowing me to participate in the care of your patient.      Sincerely,    Myrtle Lopez MD

## 2024-08-23 NOTE — NURSING NOTE
"Chief Complaint   Patient presents with    RECHECK     Vital signs:  Temp: 97.5  F (36.4  C)   BP: 104/74 Pulse: 93             Weight: 62.1 kg (137 lb)  Estimated body mass index is 24.74 kg/m  as calculated from the following:    Height as of 6/26/24: 1.585 m (5' 2.4\").    Weight as of this encounter: 62.1 kg (137 lb).      Radha Vogel CMA   8/23/2024 9:39 AM    "

## 2024-10-03 DIAGNOSIS — M79.644 FINGER PAIN, RIGHT: Primary | ICD-10-CM

## 2024-10-09 NOTE — PROGRESS NOTES
Chief Complaint:   Chief Complaint   Patient presents with    RECHECK     Follow S/P Right middle finger distal phalanx debridement and irrigation of bone. DOS: 6/26/24     Referring Physician: Ermelinda Vitale    Date of Injury: early June 2024  Mechanism of Injury: foreign body while modeling  Diagnosis: right middle finger distal phalanx osteomyelitis; right middle trigger finger  Treatment: right middle finger retained foreign body removal in ER 6/11/2024  Date of Surgery: 6/26/2024  Procedure Performed: Right middle finger distal phalanx debridement and irrigation of bone    History of Present Illness: Micah Kelly is a 69 year old RHD male presenting for evaluation of right middle finger infection after foreign body. The FB was removed and he was given doxycycline in the ER. His symptoms Have improved but he does still have pain in the middle fingertip. His doxycycline ended yesterday. No fevers or chills, no pus.    Clinical documentation by CAIT Covington on 6/11/2024 was reviewed.    7/16/2024: seen by CAIT Pierce. Missed ID appointment. Was doing well at that time.    8/14/2024: seen by ID, found to have E. Faecalis, being treated with PO amoxicillin x6 weeks; no pain, no drainage or other wound issues    10/16/2024: No pain at the distal phalanx, sometimes has soreness in the A1 pulley region of the right middle finger.  He had triggering and locking before but it is better since he started doing his hand therapy exercises.    Occupation: mostly retired, works part time as a musician playing the bass guitar    Physical Examination:  There were no vitals filed for this visit.  There is no height or weight on file to calculate BMI.    Well appearing, well nourished  Alert and oriented x 3, cooperative with exam     Right middle finger  Wound well healed, no erythema  Able to flex and extend middle finger, full extension at PIP/MCP joints, 5 degree extension lag at the DIP joint  No tenderness at the  distal phalanx or finger tip  Minimal tenderness to palpation at the A1 pulley, no triggering felt today  Motor Exam: Intact TU/OK/x2-3  Sensory Exam: Sensation intact to light touch in FDWS (radial), volar IF (median), volar SF (ulnar)  Vascular Exam: fingers wwp    Imaging/Studies:  XR (3 views) of the right middle finger was obtained 10/16/2024.  I reviewed the images with the patient.  The imaging study shows s/p distal phalanx tuft debridement, no further erosions    CRP 7/25/2024: <3    OR cultures/pathology 6/26/2024:   - aerobic: Enterococcus faecalis  Bone, Right middle finger, debridement:  - Bone with focal acute inflammation, fibrosis and reactive changes    XR (3 views) of the right middle finger was obtained 6/19/2024.  I reviewed the images with the patient.  The imaging study shows increased erosion of the distal phalanx tuft.    XR (3 views) of the right middle finger was obtained  6/11/2024 .  I reviewed the images with the patient.  The imaging study shows a punctate <1mm foreign body at the tip of the digit, and a linear foreign body at the tip of the digit -- the linear FB was removed. There is an erosion at the tuft of the distal phalanx which maybe consistent with osteomyelitis    Assessment: Micah Kelly is a 69 year old male with right middle finger distal phalanx osteomyelitis (foreign body has since been removed), now s/p distal phalanx debridement.  Right middle trigger finger    Plan:   I had a discussion with the patient regarding my clinical findings, diagnosis, and treatment plan. We reviewed the post-operative plan, frequency of follow-up, rehabilitation, and expected outcomes. His infection appears to be resolved as he is no longer having pain, his wound has healed, he completed antibiotics.  We also reviewed treatment options for trigger finger including observation, steroid injection, splinting, surgery.  The patient elects observation at this point since he is getting  better.  All questions answered.     WBAT right upper extremity.   Activities as tolerated    Next Visit:   Follow-up: as needed    SHARDA BENZ MD

## 2024-10-16 ENCOUNTER — OFFICE VISIT (OUTPATIENT)
Dept: ORTHOPEDICS | Facility: CLINIC | Age: 69
End: 2024-10-16
Payer: COMMERCIAL

## 2024-10-16 ENCOUNTER — ANCILLARY PROCEDURE (OUTPATIENT)
Dept: GENERAL RADIOLOGY | Facility: CLINIC | Age: 69
End: 2024-10-16
Attending: STUDENT IN AN ORGANIZED HEALTH CARE EDUCATION/TRAINING PROGRAM
Payer: COMMERCIAL

## 2024-10-16 DIAGNOSIS — M86.141 OTHER ACUTE OSTEOMYELITIS OF RIGHT HAND (H): Primary | ICD-10-CM

## 2024-10-16 DIAGNOSIS — M79.644 FINGER PAIN, RIGHT: ICD-10-CM

## 2024-10-16 PROCEDURE — 99213 OFFICE O/P EST LOW 20 MIN: CPT | Performed by: STUDENT IN AN ORGANIZED HEALTH CARE EDUCATION/TRAINING PROGRAM

## 2024-10-16 PROCEDURE — 73140 X-RAY EXAM OF FINGER(S): CPT | Mod: RT | Performed by: RADIOLOGY

## 2024-10-16 NOTE — NURSING NOTE
Reason For Visit:   Chief Complaint   Patient presents with    RECHECK     Follow S/P Right middle finger distal phalanx debridement and irrigation of bone. DOS: 6/26/24       Primary MD: Gali Kennedy  Ref. MD: Stacy    Age: 69 year old    ?  No      There were no vitals taken for this visit.      Pain Assessment  Patient Currently in Pain: Yes (Middle finger trigger finger is sore, but not surgical site)  Primary Pain Location: Finger (Comment which one) (Right middle)  Pain Descriptors: Sore, Intermittent    Hand Dominance Evaluation  Hand Dominance: Right          QuickDASH Assessment      10/16/2024     1:17 PM   QuickDASH Main   1. Open a tight or new jar Mild difficulty   2. Do heavy household chores (e.g., wash walls, floors) Mild difficulty   3. Carry a shopping bag or briefcase Mild difficulty   4. Wash your back No difficulty   5. Use a knife to cut food No difficulty   6. Recreational activities in which you take some force or impact through your arm, shoulder or hand (e.g., golf, hammering, tennis, etc.) Mild difficulty   7. During the past week, to what extent has your arm, shoulder or hand problem interfered with your normal social activities with family, friends, neighbours or groups Not at all   8. During the past week, were you limited in your work or other regular daily activities as a result of your arm, shoulder or hand problem Not limited at all   9. Arm, shoulder or hand pain None   10.Tingling (pins and needles) in your arm,shoulder or hand None   11. During the past week, how much difficulty have you had sleeping because of the pain in your arm, shoulder or hand Mild difficulty   Quickdash Ability Score 11.36          Current Outpatient Medications   Medication Sig Dispense Refill    Ascorbic Acid (VITAMIN C PO) Take 1,000 mg by mouth daily      aspirin (ASA) 81 MG chewable tablet Take 81 mg by mouth      ASPIRIN PO Take 81 mg by mouth daily       folic acid-vit B6-vit B12  (FOLGARD) 0.8-10-0.115 MG TABS per tablet Take by mouth daily      lisinopril (ZESTRIL) 20 MG tablet TAKE 1 TABLET BY MOUTH EVERY DAY 90 tablet 2    sulfamethoxazole-trimethoprim (BACTRIM DS) 800-160 MG tablet Take 1 tablet by mouth 2 times daily 33 tablet 0    sulfamethoxazole-trimethoprim (BACTRIM DS) 800-160 MG tablet Take 1 tablet by mouth 2 times daily (Patient not taking: Reported on 7/25/2024) 14 tablet 0    VITAMIN D, CHOLECALCIFEROL, PO Take 400 Units by mouth daily         Allergies   Allergen Reactions    Fentanyl Nausea       Dolores Aden, ATC

## 2024-10-16 NOTE — LETTER
10/16/2024      Micah Kelly  [address]  [facility] [address]      Dear Colleague,    Thank you for referring your patient, Micah Kelly, to the [facility] [place]. Please see a copy of my visit note below.    Chief Complaint:   Chief Complaint   Patient presents with     RECHECK     Follow S/P Right middle finger distal phalanx debridement and irrigation of bone. DOS: 6/26/24     Referring Physician: Ermelinda Vitale    Date of Injury: early June 2024  Mechanism of Injury: foreign body while modeling  Diagnosis: right middle finger distal phalanx osteomyelitis; right middle trigger finger  Treatment: right middle finger retained foreign body removal in ER 6/11/2024  Date of Surgery: 6/26/2024  Procedure Performed: Right middle finger distal phalanx debridement and irrigation of bone    History of Present Illness: Micah Kelly is a 69 year old RHD male presenting for evaluation of right middle finger infection after foreign body. The FB was removed and he was given doxycycline in the ER. His symptoms Have improved but he does still have pain in the middle fingertip. His doxycycline ended yesterday. No fevers or chills, no pus.    Clinical documentation by CAIT C[place]ton on 6/11/2024 was reviewed.    7/16/2024: seen by CAIT Pierce. Missed ID appointment. Was doing well at that time.    8/14/2024: seen by ID, found to have E. Faecalis, being treated with PO amoxicillin x6 weeks; no pain, no drainage or other wound issues    10/16/2024: No pain at the distal phalanx, sometimes has soreness in the A1 pulley region of the right middle finger.  He had triggering and locking before but it is better since he started doing his hand therapy exercises.    Occupation: mostly retired, works part time as a musician playing the bass guitar    Physical Examination:  There were no vitals filed for this visit.  There is no height or weight on file to calculate BMI.    Well appearing,  well nourished  Alert and oriented x 3, cooperative with exam     Right middle finger  Wound well healed, no erythema  Able to flex and extend middle finger, full extension at PIP/MCP joints, 5 degree extension lag at the DIP joint  No tenderness at the distal phalanx or finger tip  Minimal tenderness to palpation at the A1 pulley, no triggering felt today  Motor Exam: Intact TU/OK/x2-3  Sensory Exam: Sensation intact to light touch in FDWS (radial), volar IF (median), volar SF (ulnar)  Vascular Exam: fingers wwp    Imaging/Studies:  XR (3 views) of the right middle finger was obtained 10/16/2024.  I reviewed the images with the patient.  The imaging study shows s/p distal phalanx tuft debridement, no further erosions    CRP 7/25/2024: <3    OR cultures/pathology 6/26/2024:   - aerobic: Enterococcus faecalis  Bone, Right middle finger, debridement:  - Bone with focal acute inflammation, fibrosis and reactive changes    XR (3 views) of the right middle finger was obtained 6/19/2024.  I reviewed the images with the patient.  The imaging study shows increased erosion of the distal phalanx tuft.    XR (3 views) of the right middle finger was obtained  6/11/2024 .  I reviewed the images with the patient.  The imaging study shows a punctate <1mm foreign body at the tip of the digit, and a linear foreign body at the tip of the digit -- the linear FB was removed. There is an erosion at the tuft of the distal phalanx which maybe consistent with osteomyelitis    Assessment: Micah Kelly is a 69 year old male with right middle finger distal phalanx osteomyelitis (foreign body has since been removed), now s/p distal phalanx debridement.  Right middle trigger finger    Plan:   I had a discussion with the patient regarding my clinical findings, diagnosis, and treatment plan. We reviewed the post-operative plan, frequency of follow-up, rehabilitation, and expected outcomes. His infection appears to be resolved as he is no  longer having pain, his wound has healed, he completed antibiotics.  We also reviewed treatment options for trigger finger including observation, steroid injection, splinting, surgery.  The patient elects observation at this point since he is getting better.  All questions answered.      WBAT right upper extremity.    Activities as tolerated    Next Visit:    Follow-up: as needed    SHARDA BENZ MD      Again, thank you for allowing me to participate in the care of your patient.        Sincerely,        SHARDA BENZ MD

## 2024-11-04 PROBLEM — M79.641 PAIN OF RIGHT HAND: Status: RESOLVED | Noted: 2024-02-29 | Resolved: 2024-11-04

## 2024-11-04 PROBLEM — M65.30 TRIGGER FINGER, ACQUIRED: Status: RESOLVED | Noted: 2024-02-15 | Resolved: 2024-11-04

## 2025-07-02 DIAGNOSIS — R35.0 URINARY FREQUENCY: ICD-10-CM

## 2025-07-02 RX ORDER — TAMSULOSIN HYDROCHLORIDE 0.4 MG/1
0.4 CAPSULE ORAL DAILY
Qty: 90 CAPSULE | Refills: 1 | OUTPATIENT
Start: 2025-07-02

## 2025-07-10 DIAGNOSIS — R35.0 URINARY FREQUENCY: ICD-10-CM

## 2025-07-10 RX ORDER — TAMSULOSIN HYDROCHLORIDE 0.4 MG/1
0.4 CAPSULE ORAL DAILY
Qty: 90 CAPSULE | Refills: 0 | Status: SHIPPED | OUTPATIENT
Start: 2025-07-10

## (undated) DEVICE — PACK HAND CUSTOM ASC

## (undated) DEVICE — GLOVE BIOGEL PI MICRO SZ 6.0 48560

## (undated) DEVICE — SOL NACL 0.9% IRRIG 500ML BOTTLE 2F7123

## (undated) DEVICE — RX BACITRACIN OINTMENT 0.9G 1/32OZ CUR001109

## (undated) DEVICE — KIT CULTURE ESWAB AEROBE/ANAEROBE WHITE TOP R723480

## (undated) DEVICE — CAST PADDING 3" STERILE 9043S

## (undated) DEVICE — TUBE CULTURE ANAEROBIC PORT-A-CUL 11ML

## (undated) DEVICE — GLOVE BIOGEL PI MICRO INDICATOR UNDERGLOVE SZ 6.0 48960

## (undated) DEVICE — LINEN ORTHO PACK 5446

## (undated) DEVICE — COVER CAMERA IN-LIGHT DISP LT-C02

## (undated) DEVICE — DRSG STERI STRIP 1/2X4" R1547

## (undated) DEVICE — SU MONOCRYL 4-0 PS-2 27" UND Y426H

## (undated) DEVICE — TOURNIQUET TOURNI-COT FINGER RED MED TCM

## (undated) DEVICE — PREP CHLORAPREP 26ML TINTED HI-LITE ORANGE 930815

## (undated) DEVICE — SUCTION MANIFOLD NEPTUNE 2 SYS 1 PORT 702-025-000

## (undated) RX ORDER — FENTANYL CITRATE 50 UG/ML
INJECTION, SOLUTION INTRAMUSCULAR; INTRAVENOUS
Status: DISPENSED
Start: 2018-12-14

## (undated) RX ORDER — ONDANSETRON 2 MG/ML
INJECTION INTRAMUSCULAR; INTRAVENOUS
Status: DISPENSED
Start: 2018-12-14

## (undated) RX ORDER — BUPIVACAINE HYDROCHLORIDE 5 MG/ML
INJECTION, SOLUTION EPIDURAL; INTRACAUDAL
Status: DISPENSED
Start: 2024-06-26

## (undated) RX ORDER — LIDOCAINE HYDROCHLORIDE 10 MG/ML
INJECTION, SOLUTION INFILTRATION; PERINEURAL
Status: DISPENSED
Start: 2018-02-28

## (undated) RX ORDER — LIDOCAINE HYDROCHLORIDE 10 MG/ML
INJECTION, SOLUTION EPIDURAL; INFILTRATION; INTRACAUDAL; PERINEURAL
Status: DISPENSED
Start: 2024-06-26

## (undated) RX ORDER — TRIAMCINOLONE ACETONIDE 40 MG/ML
INJECTION, SUSPENSION INTRA-ARTICULAR; INTRAMUSCULAR
Status: DISPENSED
Start: 2018-02-28